# Patient Record
Sex: MALE | Race: BLACK OR AFRICAN AMERICAN | NOT HISPANIC OR LATINO | Employment: OTHER | URBAN - METROPOLITAN AREA
[De-identification: names, ages, dates, MRNs, and addresses within clinical notes are randomized per-mention and may not be internally consistent; named-entity substitution may affect disease eponyms.]

---

## 2017-10-31 ENCOUNTER — HOSPITAL ENCOUNTER (EMERGENCY)
Facility: HOSPITAL | Age: 77
Discharge: HOME | End: 2017-10-31
Attending: EMERGENCY MEDICINE
Payer: MEDICARE

## 2017-10-31 VITALS
SYSTOLIC BLOOD PRESSURE: 108 MMHG | RESPIRATION RATE: 20 BRPM | HEIGHT: 69 IN | WEIGHT: 172 LBS | TEMPERATURE: 98 F | HEART RATE: 135 BPM | OXYGEN SATURATION: 96 % | BODY MASS INDEX: 25.48 KG/M2 | DIASTOLIC BLOOD PRESSURE: 70 MMHG

## 2017-10-31 DIAGNOSIS — R33.9 URINARY RETENTION: Primary | ICD-10-CM

## 2017-10-31 LAB
BACTERIA #/AREA URNS HPF: ABNORMAL /HPF
BILIRUB UR QL STRIP: NEGATIVE
CLARITY UR: ABNORMAL
COLOR UR: YELLOW
GLUCOSE UR QL STRIP: NEGATIVE
HGB UR QL STRIP: ABNORMAL
KETONES UR QL STRIP: ABNORMAL
LEUKOCYTE ESTERASE UR QL STRIP: NEGATIVE
MICROSCOPIC COMMENT: ABNORMAL
NITRITE UR QL STRIP: NEGATIVE
PH UR STRIP: 6 [PH] (ref 5–8)
PROT UR QL STRIP: ABNORMAL
RBC #/AREA URNS HPF: >100 /HPF (ref 0–4)
SP GR UR STRIP: 1.02 (ref 1–1.03)
URN SPEC COLLECT METH UR: ABNORMAL
UROBILINOGEN UR STRIP-ACNC: NEGATIVE EU/DL
WBC #/AREA URNS HPF: 10 /HPF (ref 0–5)

## 2017-10-31 PROCEDURE — 99283 EMERGENCY DEPT VISIT LOW MDM: CPT

## 2017-10-31 PROCEDURE — 51702 INSERT TEMP BLADDER CATH: CPT

## 2017-10-31 PROCEDURE — 81000 URINALYSIS NONAUTO W/SCOPE: CPT

## 2017-10-31 RX ORDER — LIDOCAINE HYDROCHLORIDE 20 MG/ML
JELLY TOPICAL
Status: DISCONTINUED
Start: 2017-10-31 | End: 2017-10-31 | Stop reason: HOSPADM

## 2017-10-31 NOTE — ED PROVIDER NOTES
Encounter Date: 10/31/2017       History     Chief Complaint   Patient presents with    Urinary Retention     x 3 hours     HPI  Review of patient's allergies indicates:   Allergen Reactions    Sulfa (sulfonamide antibiotics) Rash     Past Medical History:   Diagnosis Date    Allergy     Arthritis     Cataract     Hypertension      Past Surgical History:   Procedure Laterality Date    knee surgery      right knee     Family History   Problem Relation Age of Onset    Heart disease Mother     Cancer Paternal Grandmother      stroke    Cancer Maternal Grandmother      lung cancer    Cancer Maternal Grandfather      brain cancer     Social History   Substance Use Topics    Smoking status: Former Smoker    Smokeless tobacco: Never Used    Alcohol use No     Review of Systems    Physical Exam     Initial Vitals [10/31/17 0240]   BP Pulse Resp Temp SpO2   108/70 (!) 135 20 98.2 °F (36.8 °C) 96 %      MAP       82.67         Physical Exam    ED Course   Procedures  Labs Reviewed - No data to display          Medical Decision Making:   ED Management:  Josh Pinto is a 77 y.o. male who presents with  a one-day history of progressively worsening urinary retention.  Catheter is placed with 600 mL of urine.  There is no evidence of UTI.  He has a history of BPH which most likely attributes to his urinary retention.  Valdez is left in place with referral to urology.                   ED Course      Clinical Impression:   The encounter diagnosis was Urinary retention.                           Chi Vaughan III, MD  10/31/17 1936

## 2017-10-31 NOTE — ED NOTES
Pt presents with complaints of urinary retention. Last time he urinated was about 3 hours prior to arrival and then only a small amount, small stream and still felt and feels full. History of enlarged prostate and has had to have a catheter before about 2 years ago for this same problem. Pt alert and oriented. Skin intact, warm and dry. [facility]BR[facility] well.

## 2019-05-17 ENCOUNTER — HOSPITAL ENCOUNTER (OUTPATIENT)
Dept: RADIOLOGY | Facility: HOSPITAL | Age: 79
Discharge: HOME | End: 2019-05-17
Attending: NURSE PRACTITIONER
Payer: MEDICARE

## 2019-05-17 DIAGNOSIS — R60.0 ARM EDEMA: Primary | ICD-10-CM

## 2019-05-17 DIAGNOSIS — R60.0 ARM EDEMA: ICD-10-CM

## 2019-05-17 PROCEDURE — 93971 US UPPER EXTREMITY VEINS RIGHT: ICD-10-PCS | Mod: 26,RT,, | Performed by: RADIOLOGY

## 2019-05-17 PROCEDURE — 93971 EXTREMITY STUDY: CPT | Mod: TC,PN,RT

## 2019-05-17 PROCEDURE — 93971 EXTREMITY STUDY: CPT | Mod: 26,RT,, | Performed by: RADIOLOGY

## 2020-03-12 DIAGNOSIS — M54.2 CERVICALGIA: Primary | ICD-10-CM

## 2023-06-06 ENCOUNTER — TELEPHONE (OUTPATIENT)
Dept: NEUROLOGY | Facility: CLINIC | Age: 83
End: 2023-06-06
Payer: MEDICARE

## 2023-06-06 NOTE — TELEPHONE ENCOUNTER
Returned pt's call. Pt stated he has not seen a neurologist or had any testing done but he feels he may have MS based on what he has researched. Pt asked to see Dr Leo. This Rn explained Dr Leo does not do MS work ups. Offered to help pt set up appt with general neurology and he declined.

## 2023-06-06 NOTE — TELEPHONE ENCOUNTER
----- Message from Maria Guadalupe Flanagan RN sent at 6/6/2023  2:16 PM CDT -----  Regarding: FW: NP appt request  Contact: @ [phone]    ----- Message -----  From: Marcel Golden  Sent: 6/6/2023   2:11 PM CDT  To: , #  Subject: NP appt request                                  Pt called in stating that he thinks that he might have MS. Pt is requesting to be seen by Nadja Leo if she is accepting new patients. Please call pt to discuss further.

## 2023-06-07 ENCOUNTER — TELEPHONE (OUTPATIENT)
Dept: NEUROLOGY | Facility: CLINIC | Age: 83
End: 2023-06-07
Payer: MEDICARE

## 2023-06-07 NOTE — TELEPHONE ENCOUNTER
----- Message from Marcel Golden sent at 6/6/2023  2:04 PM CDT -----  Regarding: NP appt request  Contact: @ [phone]  Pt called in stating that he thinks that he might have MS. Pt is requesting to be seen by Nadja Leo if she is accepting new patients. Please call pt to discuss further.

## 2023-08-13 ENCOUNTER — HOSPITAL ENCOUNTER (EMERGENCY)
Facility: HOSPITAL | Age: 83
Discharge: HOME | End: 2023-08-13
Attending: EMERGENCY MEDICINE
Payer: MEDICARE

## 2023-08-13 VITALS
DIASTOLIC BLOOD PRESSURE: 72 MMHG | SYSTOLIC BLOOD PRESSURE: 119 MMHG | RESPIRATION RATE: 19 BRPM | BODY MASS INDEX: 24.59 KG/M2 | TEMPERATURE: 98 F | WEIGHT: 166 LBS | HEART RATE: 98 BPM | OXYGEN SATURATION: 90 % | HEIGHT: 69 IN

## 2023-08-13 DIAGNOSIS — R31.0 GROSS HEMATURIA: Primary | ICD-10-CM

## 2023-08-13 LAB
ALBUMIN SERPL BCP-MCNC: 3.6 G/DL (ref 3.5–5.2)
ALP SERPL-CCNC: 115 U/L (ref 55–135)
ALT SERPL W/O P-5'-P-CCNC: 15 U/L (ref 10–44)
ANION GAP SERPL CALC-SCNC: 11 MMOL/L (ref 8–16)
AST SERPL-CCNC: 19 U/L (ref 10–40)
BASOPHILS # BLD AUTO: 0.04 K/UL (ref 0–0.2)
BASOPHILS NFR BLD: 0.4 % (ref 0–1.9)
BILIRUB SERPL-MCNC: 0.6 MG/DL (ref 0.1–1)
BILIRUB UR QL STRIP: ABNORMAL
BUN SERPL-MCNC: 29 MG/DL (ref 8–23)
CALCIUM SERPL-MCNC: 9.6 MG/DL (ref 8.7–10.5)
CHLORIDE SERPL-SCNC: 110 MMOL/L (ref 95–110)
CLARITY UR: ABNORMAL
CO2 SERPL-SCNC: 19 MMOL/L (ref 23–29)
COLOR UR: ABNORMAL
CREAT SERPL-MCNC: 2.1 MG/DL (ref 0.5–1.4)
DIFFERENTIAL METHOD: ABNORMAL
EOSINOPHIL # BLD AUTO: 0.2 K/UL (ref 0–0.5)
EOSINOPHIL NFR BLD: 1.6 % (ref 0–8)
ERYTHROCYTE [DISTWIDTH] IN BLOOD BY AUTOMATED COUNT: 14.3 % (ref 11.5–14.5)
EST. GFR  (NO RACE VARIABLE): 31 ML/MIN/1.73 M^2
GLUCOSE SERPL-MCNC: 150 MG/DL (ref 70–110)
GLUCOSE UR QL STRIP: ABNORMAL
HCT VFR BLD AUTO: 52.1 % (ref 40–54)
HGB BLD-MCNC: 17.7 G/DL (ref 14–18)
HGB UR QL STRIP: ABNORMAL
IMM GRANULOCYTES # BLD AUTO: 0.03 K/UL (ref 0–0.04)
IMM GRANULOCYTES NFR BLD AUTO: 0.3 % (ref 0–0.5)
INR PPP: 1.1 (ref 0.8–1.2)
KETONES UR QL STRIP: ABNORMAL
LEUKOCYTE ESTERASE UR QL STRIP: ABNORMAL
LYMPHOCYTES # BLD AUTO: 0.7 K/UL (ref 1–4.8)
LYMPHOCYTES NFR BLD: 6.9 % (ref 18–48)
MCH RBC QN AUTO: 30.7 PG (ref 27–31)
MCHC RBC AUTO-ENTMCNC: 34 G/DL (ref 32–36)
MCV RBC AUTO: 90 FL (ref 82–98)
MICROSCOPIC COMMENT: ABNORMAL
MONOCYTES # BLD AUTO: 1.3 K/UL (ref 0.3–1)
MONOCYTES NFR BLD: 12.6 % (ref 4–15)
NEUTROPHILS # BLD AUTO: 8 K/UL (ref 1.8–7.7)
NEUTROPHILS NFR BLD: 78.2 % (ref 38–73)
NITRITE UR QL STRIP: ABNORMAL
NRBC BLD-RTO: 0 /100 WBC
PH UR STRIP: ABNORMAL [PH] (ref 5–8)
PLATELET # BLD AUTO: 236 K/UL (ref 150–450)
PMV BLD AUTO: 10.5 FL (ref 9.2–12.9)
POTASSIUM SERPL-SCNC: 4.3 MMOL/L (ref 3.5–5.1)
PROT SERPL-MCNC: 7.9 G/DL (ref 6–8.4)
PROT UR QL STRIP: ABNORMAL
PROTHROMBIN TIME: 11.2 SEC (ref 9–12.5)
RBC # BLD AUTO: 5.77 M/UL (ref 4.6–6.2)
RBC #/AREA URNS HPF: >100 /HPF (ref 0–4)
SODIUM SERPL-SCNC: 140 MMOL/L (ref 136–145)
SP GR UR STRIP: ABNORMAL (ref 1–1.03)
URN SPEC COLLECT METH UR: ABNORMAL
UROBILINOGEN UR STRIP-ACNC: ABNORMAL EU/DL
WBC # BLD AUTO: 10.17 K/UL (ref 3.9–12.7)
WBC #/AREA URNS HPF: 10 /HPF (ref 0–5)

## 2023-08-13 PROCEDURE — 81000 URINALYSIS NONAUTO W/SCOPE: CPT | Performed by: EMERGENCY MEDICINE

## 2023-08-13 PROCEDURE — 99283 EMERGENCY DEPT VISIT LOW MDM: CPT

## 2023-08-13 PROCEDURE — 85025 COMPLETE CBC W/AUTO DIFF WBC: CPT | Performed by: EMERGENCY MEDICINE

## 2023-08-13 PROCEDURE — 80053 COMPREHEN METABOLIC PANEL: CPT | Performed by: EMERGENCY MEDICINE

## 2023-08-13 PROCEDURE — 85610 PROTHROMBIN TIME: CPT | Performed by: EMERGENCY MEDICINE

## 2023-08-13 RX ORDER — HYDROCODONE BITARTRATE AND ACETAMINOPHEN 5; 325 MG/1; MG/1
1 TABLET ORAL EVERY 4 HOURS PRN
Qty: 8 TABLET | Refills: 0 | Status: ON HOLD | OUTPATIENT
Start: 2023-08-13 | End: 2023-08-30 | Stop reason: SDUPTHER

## 2023-08-13 NOTE — DISCHARGE INSTRUCTIONS
As we discussed, follow-up with your primary care doctor tomorrow.  A referral to be seen by a urologist has also been entered.  You should receive a phone call within the next several days regarding an appointment.  Make sure you drink lots of water over the next several days.  Return here immediately if you have any difficulty passing urine.

## 2023-08-13 NOTE — ED PROVIDER NOTES
"Encounter Date: 8/13/2023       History     Chief Complaint   Patient presents with    Hematuria     Patient reports that overnight he developed pain and bloody urine. Patient states he was seen at an urgent care clinic and was told to go to the ER.      83-year-old male, here from home via private vehicle for evaluation and treatment of hematuria.  Patient 1st noticed hematuria last night at around midnight.  He has mild discomfort "in the prostate".  Has a history of enlarged prostate and takes medications for this.  Does not see a urologist.  He states that his physician recently checked his PSA and found it to be normal.  Patient denies fever or chills.  No dysuria before the hematuria started.  He has been urinating without difficulty.  No clots.  The symptoms better or worse.      Review of patient's allergies indicates:   Allergen Reactions    Sulfa (sulfonamide antibiotics) Rash     Past Medical History:   Diagnosis Date    Allergy     Arthritis     Cataract     Hypertension      Past Surgical History:   Procedure Laterality Date    knee surgery      right knee     Family History   Problem Relation Age of Onset    Heart disease Mother     Cancer Paternal Grandmother         stroke    Cancer Maternal Grandmother         lung cancer    Cancer Maternal Grandfather         brain cancer     Social History     Tobacco Use    Smoking status: Former     Current packs/day: 0.00    Smokeless tobacco: Never   Substance Use Topics    Alcohol use: No    Drug use: No     Review of Systems   Constitutional:  Negative for chills and fever.   HENT: Negative.     Eyes: Negative.    Respiratory: Negative.     Cardiovascular: Negative.    Gastrointestinal: Negative.    Endocrine: Negative.    Genitourinary:  Positive for hematuria. Negative for decreased urine volume, dysuria, enuresis, flank pain, frequency, penile pain, penile swelling, scrotal swelling and testicular pain.   Musculoskeletal: Negative.    Skin: Negative.  "   Allergic/Immunologic: Negative.    Neurological: Negative.    Hematological: Negative.    Psychiatric/Behavioral: Negative.         Physical Exam     Initial Vitals [08/13/23 1405]   BP Pulse Resp Temp SpO2   127/77 98 19 97.6 °F (36.4 °C) (!) 93 %      MAP       --         Physical Exam    Nursing note and vitals reviewed.  Constitutional: He appears well-developed and well-nourished. He is not diaphoretic. No distress.   HENT:   Head: Normocephalic and atraumatic.   Nose: Nose normal.   Mouth/Throat: Oropharynx is clear and moist. No oropharyngeal exudate.   Eyes: Conjunctivae and EOM are normal. Pupils are equal, round, and reactive to light. No scleral icterus.   Neck: Neck supple. No JVD present.   Normal range of motion.  Cardiovascular:  Normal rate, regular rhythm, normal heart sounds and intact distal pulses.           No murmur heard.  Pulmonary/Chest: Breath sounds normal. No stridor. No respiratory distress. He has no wheezes. He has no rhonchi. He has no rales.   Abdominal: Abdomen is soft. Bowel sounds are normal. He exhibits no distension. There is no abdominal tenderness. There is no rebound and no guarding.   Musculoskeletal:         General: No tenderness or edema. Normal range of motion.      Cervical back: Normal range of motion and neck supple.     Neurological: He is alert and oriented to person, place, and time. He has normal strength. No cranial nerve deficit or sensory deficit. GCS score is 15. GCS eye subscore is 4. GCS verbal subscore is 5. GCS motor subscore is 6.   Skin: Skin is warm and dry. Capillary refill takes less than 2 seconds. No rash noted. No erythema.   Psychiatric: He has a normal mood and affect. His behavior is normal.         ED Course   Procedures  Labs Reviewed   CBC W/ AUTO DIFFERENTIAL - Abnormal; Notable for the following components:       Result Value    Gran # (ANC) 8.0 (*)     Lymph # 0.7 (*)     Mono # 1.3 (*)     Gran % 78.2 (*)     Lymph % 6.9 (*)     All  other components within normal limits   COMPREHENSIVE METABOLIC PANEL - Abnormal; Notable for the following components:    CO2 19 (*)     Glucose 150 (*)     BUN 29 (*)     Creatinine 2.1 (*)     eGFR 31.0 (*)     All other components within normal limits   URINALYSIS, REFLEX TO URINE CULTURE - Abnormal; Notable for the following components:    Color, UA Red (*)     Appearance, UA Cloudy (*)     All other components within normal limits    Narrative:     Preferred Collection Type->Urine, Clean Catch  Specimen Source->Urine   URINALYSIS MICROSCOPIC - Abnormal; Notable for the following components:    RBC, UA >100 (*)     WBC, UA 10 (*)     All other components within normal limits    Narrative:     Preferred Collection Type->Urine, Clean Catch  Specimen Source->Urine   PROTIME-INR          Imaging Results    None          Medications - No data to display  Medical Decision Making:   Differential Diagnosis:   Neoplasm, prostatitis, coagulopathy, UTI, pyelonephritis, etc.  ED Management:  Patient's labs showed a mild elevation of his BUN/creatinine.  White count was normal.  H&H normal.  Patient complains of only mild discomfort.  A review of the patient's chart shows that about 5 years ago he had an ultrasound done which showed a mass in the left kidney, suspicious for renal cell carcinoma.  Patient states that he followed up with an oncologist after that, and was told that he does not have kidney cancer.  However, in the absence of another cause, a neoplasm must be considered.  I discussed ultrasound with the patient, but he told me that he had an ultrasound in his PCPs office less than 6 months ago.  I believe that the patient is safe for discharge at this time, with strict return precautions for any difficulty passing urine or other worsening/concerning symptoms.  Patient agrees with this plan of care.  He will follow-up with his PCP, and referral to be seen by Urology within the week has been given.  Will also  prescribe low-dose Norco for pain not controlled by Tylenol at home.  Patient understands he needs to stay below 3 g Tylenol per day.                          Clinical Impression:   Final diagnoses:  [R31.0] Gross hematuria (Primary)        ED Disposition Condition    Discharge Stable          ED Prescriptions    None       Follow-up Information       Follow up With Specialties Details Why Contact Info    Dr. Chang  Call in 1 day      Urologist   when scheduled     [facility] Emergency Dept Emergency Medicine  As needed, If symptoms worsen [address] [facility] [address] [phone]             Yuri Tavarez MD  08/13/23 8132

## 2023-08-15 ENCOUNTER — LAB VISIT (OUTPATIENT)
Dept: LAB | Facility: HOSPITAL | Age: 83
End: 2023-08-15
Attending: INTERNAL MEDICINE
Payer: MEDICARE

## 2023-08-15 DIAGNOSIS — R30.0 DYSURIA: Primary | ICD-10-CM

## 2023-08-15 LAB
PROSTATE SPECIFIC ANTIGEN, TOTAL: 3.5 NG/ML (ref 0–4)
PSA FREE MFR SERPL: 27.43 %
PSA FREE SERPL-MCNC: 0.96 NG/ML (ref 0–1.5)

## 2023-08-15 PROCEDURE — 84153 ASSAY OF PSA TOTAL: CPT | Performed by: FAMILY MEDICINE

## 2023-08-15 PROCEDURE — 36415 COLL VENOUS BLD VENIPUNCTURE: CPT | Performed by: FAMILY MEDICINE

## 2023-08-16 ENCOUNTER — HOSPITAL ENCOUNTER (EMERGENCY)
Facility: HOSPITAL | Age: 83
Discharge: ANOTHER HOSPITAL | End: 2023-08-16
Attending: EMERGENCY MEDICINE
Payer: MEDICARE

## 2023-08-16 VITALS
OXYGEN SATURATION: 95 % | HEIGHT: 69 IN | TEMPERATURE: 98 F | SYSTOLIC BLOOD PRESSURE: 100 MMHG | DIASTOLIC BLOOD PRESSURE: 58 MMHG | WEIGHT: 161 LBS | BODY MASS INDEX: 23.85 KG/M2 | HEART RATE: 80 BPM | RESPIRATION RATE: 10 BRPM

## 2023-08-16 DIAGNOSIS — N28.89 LEFT RENAL MASS: Primary | ICD-10-CM

## 2023-08-16 DIAGNOSIS — R31.0 GROSS HEMATURIA: ICD-10-CM

## 2023-08-16 DIAGNOSIS — I95.9 HYPOTENSION: ICD-10-CM

## 2023-08-16 DIAGNOSIS — D72.829 LEUKOCYTOSIS, UNSPECIFIED TYPE: ICD-10-CM

## 2023-08-16 DIAGNOSIS — A41.9 SEPSIS, DUE TO UNSPECIFIED ORGANISM, UNSPECIFIED WHETHER ACUTE ORGAN DYSFUNCTION PRESENT: ICD-10-CM

## 2023-08-16 LAB
ABO + RH BLD: NORMAL
ALBUMIN SERPL BCP-MCNC: 3.2 G/DL (ref 3.5–5.2)
ALP SERPL-CCNC: 91 U/L (ref 55–135)
ALT SERPL W/O P-5'-P-CCNC: 12 U/L (ref 10–44)
ANION GAP SERPL CALC-SCNC: 12 MMOL/L (ref 8–16)
AST SERPL-CCNC: 19 U/L (ref 10–40)
BASOPHILS # BLD AUTO: 0.02 K/UL (ref 0–0.2)
BASOPHILS NFR BLD: 0.1 % (ref 0–1.9)
BILIRUB SERPL-MCNC: 0.6 MG/DL (ref 0.1–1)
BILIRUB UR QL STRIP: ABNORMAL
BLD GP AB SCN CELLS X3 SERPL QL: NORMAL
BUN SERPL-MCNC: 45 MG/DL (ref 8–23)
CALCIUM SERPL-MCNC: 9.3 MG/DL (ref 8.7–10.5)
CHLORIDE SERPL-SCNC: 107 MMOL/L (ref 95–110)
CLARITY UR: ABNORMAL
CO2 SERPL-SCNC: 18 MMOL/L (ref 23–29)
COLOR UR: ABNORMAL
CREAT SERPL-MCNC: 2.7 MG/DL (ref 0.5–1.4)
DIFFERENTIAL METHOD: ABNORMAL
EOSINOPHIL # BLD AUTO: 0 K/UL (ref 0–0.5)
EOSINOPHIL NFR BLD: 0 % (ref 0–8)
ERYTHROCYTE [DISTWIDTH] IN BLOOD BY AUTOMATED COUNT: 14.2 % (ref 11.5–14.5)
EST. GFR  (NO RACE VARIABLE): 23 ML/MIN/1.73 M^2
GLUCOSE SERPL-MCNC: 177 MG/DL (ref 70–110)
GLUCOSE UR QL STRIP: ABNORMAL
HCT VFR BLD AUTO: 45.1 % (ref 40–54)
HCV AB SERPL QL IA: NORMAL
HGB BLD-MCNC: 15.5 G/DL (ref 14–18)
HGB UR QL STRIP: ABNORMAL
IMM GRANULOCYTES # BLD AUTO: 0.09 K/UL (ref 0–0.04)
IMM GRANULOCYTES NFR BLD AUTO: 0.6 % (ref 0–0.5)
INFLUENZA A, MOLECULAR: NEGATIVE
INFLUENZA B, MOLECULAR: NEGATIVE
INR PPP: 1.1 (ref 0.8–1.2)
KETONES UR QL STRIP: ABNORMAL
LACTATE SERPL-SCNC: 1.9 MMOL/L (ref 0.5–2.2)
LEUKOCYTE ESTERASE UR QL STRIP: ABNORMAL
LYMPHOCYTES # BLD AUTO: 0.6 K/UL (ref 1–4.8)
LYMPHOCYTES NFR BLD: 4.2 % (ref 18–48)
MCH RBC QN AUTO: 30.7 PG (ref 27–31)
MCHC RBC AUTO-ENTMCNC: 34.4 G/DL (ref 32–36)
MCV RBC AUTO: 89 FL (ref 82–98)
MICROSCOPIC COMMENT: ABNORMAL
MONOCYTES # BLD AUTO: 1.2 K/UL (ref 0.3–1)
MONOCYTES NFR BLD: 7.8 % (ref 4–15)
NEUTROPHILS # BLD AUTO: 13.2 K/UL (ref 1.8–7.7)
NEUTROPHILS NFR BLD: 87.3 % (ref 38–73)
NITRITE UR QL STRIP: ABNORMAL
NRBC BLD-RTO: 0 /100 WBC
PH UR STRIP: ABNORMAL [PH] (ref 5–8)
PLATELET # BLD AUTO: 264 K/UL (ref 150–450)
PMV BLD AUTO: 10.8 FL (ref 9.2–12.9)
POTASSIUM SERPL-SCNC: 4.1 MMOL/L (ref 3.5–5.1)
PROT SERPL-MCNC: 6.9 G/DL (ref 6–8.4)
PROT UR QL STRIP: ABNORMAL
PROTHROMBIN TIME: 11.1 SEC (ref 9–12.5)
RBC # BLD AUTO: 5.05 M/UL (ref 4.6–6.2)
RBC #/AREA URNS HPF: >100 /HPF (ref 0–4)
SARS-COV-2 RDRP RESP QL NAA+PROBE: NEGATIVE
SODIUM SERPL-SCNC: 137 MMOL/L (ref 136–145)
SP GR UR STRIP: ABNORMAL (ref 1–1.03)
SPECIMEN OUTDATE: NORMAL
SPECIMEN SOURCE: NORMAL
TROPONIN I SERPL DL<=0.01 NG/ML-MCNC: 0.01 NG/ML (ref 0–0.03)
URN SPEC COLLECT METH UR: ABNORMAL
UROBILINOGEN UR STRIP-ACNC: ABNORMAL EU/DL
WBC # BLD AUTO: 15.13 K/UL (ref 3.9–12.7)
WBC #/AREA URNS HPF: 10 /HPF (ref 0–5)

## 2023-08-16 PROCEDURE — 86803 HEPATITIS C AB TEST: CPT | Performed by: EMERGENCY MEDICINE

## 2023-08-16 PROCEDURE — 71045 X-RAY EXAM CHEST 1 VIEW: CPT | Mod: 26,,, | Performed by: RADIOLOGY

## 2023-08-16 PROCEDURE — 74176 CT ABDOMEN PELVIS WITHOUT CONTRAST: ICD-10-PCS | Mod: 26,,, | Performed by: RADIOLOGY

## 2023-08-16 PROCEDURE — 85610 PROTHROMBIN TIME: CPT | Performed by: EMERGENCY MEDICINE

## 2023-08-16 PROCEDURE — 74176 CT ABD & PELVIS W/O CONTRAST: CPT | Mod: 26,,, | Performed by: RADIOLOGY

## 2023-08-16 PROCEDURE — U0002 COVID-19 LAB TEST NON-CDC: HCPCS | Performed by: EMERGENCY MEDICINE

## 2023-08-16 PROCEDURE — 83605 ASSAY OF LACTIC ACID: CPT | Performed by: EMERGENCY MEDICINE

## 2023-08-16 PROCEDURE — 85025 COMPLETE CBC W/AUTO DIFF WBC: CPT | Performed by: EMERGENCY MEDICINE

## 2023-08-16 PROCEDURE — 84484 ASSAY OF TROPONIN QUANT: CPT | Performed by: EMERGENCY MEDICINE

## 2023-08-16 PROCEDURE — 96367 TX/PROPH/DG ADDL SEQ IV INF: CPT

## 2023-08-16 PROCEDURE — 25000003 PHARM REV CODE 250: Performed by: EMERGENCY MEDICINE

## 2023-08-16 PROCEDURE — 86900 BLOOD TYPING SEROLOGIC ABO: CPT | Performed by: EMERGENCY MEDICINE

## 2023-08-16 PROCEDURE — 96365 THER/PROPH/DIAG IV INF INIT: CPT

## 2023-08-16 PROCEDURE — 81000 URINALYSIS NONAUTO W/SCOPE: CPT | Performed by: EMERGENCY MEDICINE

## 2023-08-16 PROCEDURE — 96366 THER/PROPH/DIAG IV INF ADDON: CPT

## 2023-08-16 PROCEDURE — 93010 EKG 12-LEAD: ICD-10-PCS | Mod: ,,, | Performed by: INTERNAL MEDICINE

## 2023-08-16 PROCEDURE — 87040 BLOOD CULTURE FOR BACTERIA: CPT | Mod: 59 | Performed by: EMERGENCY MEDICINE

## 2023-08-16 PROCEDURE — 74176 CT ABD & PELVIS W/O CONTRAST: CPT | Mod: TC

## 2023-08-16 PROCEDURE — 93010 ELECTROCARDIOGRAM REPORT: CPT | Mod: ,,, | Performed by: INTERNAL MEDICINE

## 2023-08-16 PROCEDURE — 71045 XR CHEST AP PORTABLE: ICD-10-PCS | Mod: 26,,, | Performed by: RADIOLOGY

## 2023-08-16 PROCEDURE — 87502 INFLUENZA DNA AMP PROBE: CPT | Performed by: EMERGENCY MEDICINE

## 2023-08-16 PROCEDURE — 93005 ELECTROCARDIOGRAM TRACING: CPT

## 2023-08-16 PROCEDURE — 63600175 PHARM REV CODE 636 W HCPCS: Performed by: EMERGENCY MEDICINE

## 2023-08-16 PROCEDURE — 80053 COMPREHEN METABOLIC PANEL: CPT | Performed by: EMERGENCY MEDICINE

## 2023-08-16 PROCEDURE — 96361 HYDRATE IV INFUSION ADD-ON: CPT

## 2023-08-16 PROCEDURE — 71045 X-RAY EXAM CHEST 1 VIEW: CPT | Mod: TC

## 2023-08-16 PROCEDURE — 36415 COLL VENOUS BLD VENIPUNCTURE: CPT | Performed by: EMERGENCY MEDICINE

## 2023-08-16 PROCEDURE — 87389 HIV-1 AG W/HIV-1&-2 AB AG IA: CPT | Performed by: EMERGENCY MEDICINE

## 2023-08-16 PROCEDURE — 99291 CRITICAL CARE FIRST HOUR: CPT

## 2023-08-16 RX ORDER — PIPERACILLIN SODIUM, TAZOBACTAM SODIUM 3; .375 G/15ML; G/15ML
INJECTION, POWDER, LYOPHILIZED, FOR SOLUTION INTRAVENOUS
Status: DISPENSED
Start: 2023-08-16 | End: 2023-08-17

## 2023-08-16 RX ADMIN — PIPERACILLIN AND TAZOBACTAM 3.38 G: 3; .375 INJECTION, POWDER, LYOPHILIZED, FOR SOLUTION INTRAVENOUS; PARENTERAL at 07:08

## 2023-08-16 RX ADMIN — SODIUM CHLORIDE 1000 ML: 9 INJECTION, SOLUTION INTRAVENOUS at 01:08

## 2023-08-16 RX ADMIN — CEFTRIAXONE SODIUM 1 G: 1 INJECTION, POWDER, FOR SOLUTION INTRAMUSCULAR; INTRAVENOUS at 05:08

## 2023-08-16 RX ADMIN — SODIUM CHLORIDE 1000 ML: 9 INJECTION, SOLUTION INTRAVENOUS at 05:08

## 2023-08-16 RX ADMIN — DEXTROSE MONOHYDRATE 1750 MG: 5 INJECTION, SOLUTION INTRAVENOUS at 05:08

## 2023-08-16 NOTE — ED TRIAGE NOTES
Patient relays that he has been weak and dizzy. Heavy blood noted in urine x 4 days. Patient is hypotensive in triage.

## 2023-08-16 NOTE — ED PROVIDER NOTES
Encounter Date: 8/16/2023       History     Chief Complaint   Patient presents with    Dizziness    Weakness    hypotensive     Patient relays that he has been experiencing hematuria x 4 days. Weakness today. Hypotensive in triage.      83-year-old male here from home via private vehicle for evaluation and treatment of weakness and lightheadedness.  Patient was seen here 4 days ago with complaints of hematuria.  He was discharged home at that time with a referral to Urology.  Patient states he has an appointment tomorrow.  Today, upon awakening, patient felt fatigued and weak.  This sensation worsened throughout the day so he came here for evaluation and treatment.  Blood pressure in triage was 71/51.      Review of patient's allergies indicates:   Allergen Reactions    Sulfa (sulfonamide antibiotics) Rash     Past Medical History:   Diagnosis Date    Allergy     Arthritis     Cataract     Hypertension      Past Surgical History:   Procedure Laterality Date    BLADDER FULGURATION N/A 8/19/2023    Procedure: FULGURATION, BLADDER;  Surgeon: Abdulaziz Garcia MD;  Location: [facility] OR;  Service: Urology;  Laterality: N/A;    CYSTOSCOPY N/A 8/19/2023    Procedure: CYSTOSCOPY;  Surgeon: Abdulaziz Garcia MD;  Location: [facility] OR;  Service: Urology;  Laterality: N/A;    ESOPHAGOGASTRODUODENOSCOPY N/A 9/20/2023    Procedure: EGD (ESOPHAGOGASTRODUODENOSCOPY);  Surgeon: Varinder Melo MD;  Location: [facility] ENDO;  Service: Gastroenterology;  Laterality: N/A;    HERNIA REPAIR N/A 9/3/2023    Procedure: REPAIR, HERNIA INTERNAL;  Surgeon: Andrew Juares MD;  Location: [facility] OR;  Service: General;  Laterality: N/A;    INSERTION OF CATHETER N/A 8/19/2023    Procedure: INSERTION, CATHETER;  Surgeon: Abdulaziz Garcia MD;  Location: [facility] OR;  Service: Urology;  Laterality: N/A;    knee surgery      right knee    LAPAROSCOPIC ROBOT-ASSISTED SURGICAL REMOVAL OF KIDNEY USING DA EVIN XI Left 8/20/2023    Procedure: XI ROBOTIC  NEPHRECTOMY;  Surgeon: Abdulaziz Garcia MD;  Location: [facility] OR;  Service: Urology;  Laterality: Left;    LAPAROTOMY, EXPLORATORY N/A 9/3/2023    Procedure: LAPAROTOMY, EXPLORATORY;  Surgeon: Andrew Juares MD;  Location: [facility] OR;  Service: General;  Laterality: N/A;     Family History   Problem Relation Age of Onset    Heart disease Mother     Cancer Paternal Grandmother         stroke    Cancer Maternal Grandmother         lung cancer    Cancer Maternal Grandfather         brain cancer     Social History     Tobacco Use    Smoking status: Former    Smokeless tobacco: Never   Substance Use Topics    Alcohol use: No    Drug use: No     Review of Systems   Constitutional:  Positive for fatigue. Negative for chills and fever.   HENT:  Negative for congestion, rhinorrhea and sore throat.    Respiratory:  Negative for cough, chest tightness and shortness of breath.    Cardiovascular:  Negative for chest pain and palpitations.   Gastrointestinal:  Negative for abdominal pain, blood in stool, constipation, diarrhea, nausea and vomiting.   Genitourinary:  Positive for hematuria. Negative for decreased urine volume, dysuria, flank pain, frequency, penile discharge and testicular pain.   Musculoskeletal:  Negative for arthralgias, back pain, myalgias, neck pain and neck stiffness.   Skin:  Negative for pallor and rash.   Neurological:  Positive for weakness and light-headedness. Negative for dizziness, syncope, numbness and headaches.   Psychiatric/Behavioral:  Negative for confusion and hallucinations. The patient is not nervous/anxious and is not hyperactive.        Physical Exam     Initial Vitals [08/16/23 1248]   BP Pulse Resp Temp SpO2   (!) 71/51 (!) 120 18 97.7 °F (36.5 °C) (!) 92 %      MAP       --         Physical Exam    Nursing note and vitals reviewed.  Constitutional: He appears well-developed and well-nourished. He is not diaphoretic. No distress.   HENT:   Head: Normocephalic and atraumatic.   Nose:  Nose normal.   Mouth/Throat: Oropharynx is clear and moist. No oropharyngeal exudate.   Eyes: Conjunctivae and EOM are normal. Pupils are equal, round, and reactive to light. No scleral icterus.   Neck: Neck supple. No JVD present.   Normal range of motion.  Cardiovascular:  Normal rate, regular rhythm, normal heart sounds and intact distal pulses.           No murmur heard.  Pulmonary/Chest: Breath sounds normal. No stridor. No respiratory distress. He has no wheezes. He has no rhonchi. He has no rales.   Abdominal: Abdomen is soft. Bowel sounds are normal. He exhibits no distension. There is no abdominal tenderness.   Musculoskeletal:         General: No tenderness or edema. Normal range of motion.      Cervical back: Normal range of motion and neck supple.     Neurological: He is alert and oriented to person, place, and time. He has normal strength. No cranial nerve deficit or sensory deficit. GCS score is 15. GCS eye subscore is 4. GCS verbal subscore is 5. GCS motor subscore is 6.   Skin: Skin is warm and dry. Capillary refill takes less than 2 seconds. No rash noted. No erythema.   Psychiatric: He has a normal mood and affect. His behavior is normal.         ED Course   Critical Care    Date/Time: 8/16/2023 6:27 PM    Performed by: Yuri Tavarez MD  Authorized by: Yuri Tavarez MD  Direct patient critical care time: 37 minutes  Additional history critical care time: 6 minutes  Ordering / reviewing critical care time: 7 minutes  Documentation critical care time: 15 minutes  Consulting other physicians critical care time: 5 minutes  Total critical care time (exclusive of procedural time) : 70 minutes  Critical care time was exclusive of separately billable procedures and treating other patients and teaching time.  Critical care was necessary to treat or prevent imminent or life-threatening deterioration of the following conditions: circulatory failure, cardiac failure, shock and sepsis.  Critical care was  time spent personally by me on the following activities: discussions with consultants, interpretation of cardiac output measurements, evaluation of patient's response to treatment, examination of patient, obtaining history from patient or surrogate, ordering and performing treatments and interventions, ordering and review of laboratory studies, ordering and review of radiographic studies, pulse oximetry, re-evaluation of patient's condition and review of old charts.        Labs Reviewed   CBC W/ AUTO DIFFERENTIAL - Abnormal; Notable for the following components:       Result Value    WBC 15.13 (*)     Immature Granulocytes 0.6 (*)     Gran # (ANC) 13.2 (*)     Immature Grans (Abs) 0.09 (*)     Lymph # 0.6 (*)     Mono # 1.2 (*)     Gran % 87.3 (*)     Lymph % 4.2 (*)     All other components within normal limits   COMPREHENSIVE METABOLIC PANEL - Abnormal; Notable for the following components:    CO2 18 (*)     Glucose 177 (*)     BUN 45 (*)     Creatinine 2.7 (*)     Albumin 3.2 (*)     eGFR 23.0 (*)     All other components within normal limits   URINALYSIS, REFLEX TO URINE CULTURE - Abnormal; Notable for the following components:    Color, UA Red (*)     Appearance, UA Cloudy (*)     All other components within normal limits    Narrative:     Preferred Collection Type->Urine, Clean Catch  Specimen Source->Urine   URINALYSIS MICROSCOPIC - Abnormal; Notable for the following components:    RBC, UA >100 (*)     WBC, UA 10 (*)     All other components within normal limits    Narrative:     Preferred Collection Type->Urine, Clean Catch  Specimen Source->Urine   INFLUENZA A & B BY MOLECULAR   CULTURE, BLOOD   CULTURE, BLOOD   HIV 1 / 2 ANTIBODY    Narrative:     Release to patient->Immediate   HEPATITIS C ANTIBODY    Narrative:     Release to patient->Immediate   LACTIC ACID, PLASMA   PROTIME-INR   SARS-COV-2 RNA AMPLIFICATION, QUAL    Narrative:     Is the patient symptomatic?->No   TROPONIN I   TROPONIN I   TYPE &  SCREEN     EKG Readings: (Independently Interpreted)   EKG personally reviewed by me shows atrial fibrillation at 94 beats per minute.  QRS 70 milliseconds, .  The only previous EKG for comparison is from 5 years ago, and it did not show any evidence of AFib.       ECG Results              EKG 12-lead (Final result)  Result time 08/17/23 15:57:30      Final result by Interface, Lab In [facility] (08/17/23 15:57:30)                   Narrative:    Test Reason : I95.9,    Vent. Rate : 094 BPM     Atrial Rate : 093 BPM     P-R Int : 000 ms          QRS Dur : 070 ms      QT Int : 344 ms       P-R-T Axes : 000 040 065 degrees     QTc Int : 430 ms    Atrial fibrillation  Abnormal ECG  When compared with ECG of 09-FEB-2015 14:43,  Atrial fibrillation has replaced Sinus rhythm  Confirmed by Johnathan Angel MD ([id number]) on 8/17/2023 3:57:22 PM    Referred By: [email or web address]   SELF           Confirmed By:Johnathan Angel MD                                  Imaging Results              CT Abdomen Pelvis  Without Contrast (Final result)  Result time 08/16/23 16:29:15      Final result by Toshia Yanez MD (08/16/23 16:29:15)                   Impression:      9.4 cm solid left renal mass consistent with renal cell carcinoma    Marked enlargement of the prostate.    Stable septated left midpole renal cyst, complex cystic lesion with rim calcification at the midpole of the right kidney.    Right nephrolithiasis.  Mild left hydroureter.  Colonic diverticula.      Electronically signed by: Toshia Yanez  Date:    08/16/2023  Time:    16:29               Narrative:    EXAMINATION:  CT ABDOMEN PELVIS WITHOUT CONTRAST    CLINICAL HISTORY:  Gross hematuria;    TECHNIQUE:  Low dose axial images, sagittal and coronal reformations were obtained from the lung bases to the pubic symphysis.  Neither oral nor IV contrast was administered    COMPARISON:  MRI 10/16/2015    FINDINGS:  Bronchiectasis and atelectasis noted in the lung bases.    The  liver, spleen, adrenal glands, pancreas, gallbladder are grossly normal.  Mild intrahepatic biliary ductal dilatation is unchanged.  Gallstones noted on prior MRI are not well demonstrated on CT.    There are simple appearing cysts at the lower and mid poles of the right kidney.  At the posterior mid/upper pole there is an exophytic structure with thick, rim calcification as well as some areas of internal calcification measuring 2.0 x 2.5 x 2.4 cm, not significantly changed compared to the previous MRI.  There is a 2 mm nonobstructing right lower pole renal calculus.    There is a large lobulated solid mass extending from the upper pole of the left kidney measuring 9.4 x 7.4 x 7.1 cm.  This has increased significantly in size compared to the previous MRI at which time there was a complex cystic lesion with 2 solid nodules measuring 2.9 x 2.8 by 3.5 cm.  This is highly suspicious for renal cell carcinoma.  There is extensive nodularity of the surrounding perinephric fat which may represent spread of tumor or tumor neovascularity.  There is a large simple cyst at the left upper pole, as well as a 3 cm anterior left midpole multi septated cyst which appears similar to the prior MRI.    There is mild left hydroureter into the pelvis of uncertain etiology.  No ureteral calculus is identified.  There is marked enlargement of the prostate measuring 9.2 x 8.3 cm.  The urinary bladder is thick-walled.    There are numerous colonic diverticula.  No obstruction or inflammation in seen in the bowel.  The aorta is normal in caliber and heavily calcified.  No adenopathy or ascites is present.                                       X-Ray Chest AP Portable (Final result)  Result time 08/16/23 15:03:57   Procedure changed from X-Ray Chest PA And Lateral     Final result by Benji Plata MD (08/16/23 15:03:57)                   Impression:      No acute chest disease.      Electronically signed by: Benji  Orange  Date:    08/16/2023  Time:    15:03               Narrative:    EXAMINATION:  XR CHEST AP PORTABLE    CLINICAL HISTORY:  weakness; Hypotension, unspecified    TECHNIQUE:  Portable view of the chest was performed.    COMPARISON:  02/09/2015.    FINDINGS:  Minimal linear discoid atelectasis at the right lung base.  Lungs are otherwise clear.  No focal consolidation.  Heart size normal.  Mediastinal contours unremarkable.  Trachea midline.    Bony thorax intact.                                    X-Rays:   Independently Interpreted Readings:   Other Readings:  CT abdomen pelvis without contrast shows a 9.4 cm solid left renal mass consistent with renal cell carcinoma.  Marked prostate enlargement, stable, septated left mid pole renal cyst with a complex cystic lesion with a calcified rim at the midpole of the right kidney.  Right nephrolithiasis, mild left hydroureter.    Medications   piperacillin-tazobactam (ZOSYN) 3.375 gram injection (has no administration in time range)   sodium chloride 0.9% bolus 1,000 mL 1,000 mL ( Intravenous Stopped 8/16/23 1449)   sodium chloride 0.9% bolus 1,000 mL 1,000 mL ( Intravenous Stopped 8/16/23 1833)   cefTRIAXone (ROCEPHIN) 1 g in dextrose 5 % in water (D5W) 100 mL IVPB (MB+) (0 g Intravenous Stopped 8/16/23 1734)   vancomycin (VANCOCIN) 1,750 mg in dextrose 5 % (D5W) 500 mL IVPB (0 mg Intravenous Stopped 8/16/23 1909)   piperacillin-tazobactam (ZOSYN) 3.375 g in dextrose 5 % in water (D5W) 100 mL IVPB (MB+) (0 g Intravenous Stopped 8/16/23 2019)     Medical Decision Making:   ED Management:  Hematuria, possible source prostate, bladder, or kidneys.  Prostate is enlarged, but it is likely that patient's source of blood as the left renal mass which is likely renal cell carcinoma.  Patient also has a white count of about 15,000, and he was hypotensive upon arrival.  He denies any fever or chills but does admit to fatigue and lightheadedness.  He has been treated with  vancomycin, Rocephin, and Zosyn.  His hypotension is slowly improving with fluids.  Patient was also found to be in atrial fibrillation.  Patient denies any history of atrial fibrillation in the past.  He is not on any sort of anticoagulation.  Patient will need services of Nephrology and Cardiology.  These are unavailable at this facility at this time so patient will need transfer.  I spoke to the patient about this and he agrees.  Transfer center was notified of the need for transfer and I was put in contact with Dr. Raffi Ovalle at [facility].  Patient was accepted for transfer there.  Patient will be a priority 1 transfer.  At shift change, Dr. Elliott, [facility] emergency physician was notified about the patient and she will manage any complications which arise with the transfer.                          Clinical Impression:   Final diagnoses:  [I95.9] Hypotension  [N28.89] Left renal mass (Primary)  [R31.0] Gross hematuria  [D72.829] Leukocytosis, unspecified type  [A41.9] Sepsis, due to unspecified organism, unspecified whether acute organ dysfunction present        ED Disposition Condition    Transfer to Another Facility Stable                Yuri Tavarez MD  08/16/23 3896       Yuri Tavarez MD  09/29/23 4064

## 2023-08-17 PROBLEM — I48.91 NEW ONSET ATRIAL FIBRILLATION: Status: ACTIVE | Noted: 2023-08-17

## 2023-08-17 PROBLEM — R31.0 GROSS HEMATURIA: Status: ACTIVE | Noted: 2023-08-17

## 2023-08-17 PROBLEM — N28.89 RENAL MASS: Status: ACTIVE | Noted: 2023-08-17

## 2023-08-17 LAB — HIV 1+2 AB+HIV1 P24 AG SERPL QL IA: NORMAL

## 2023-08-17 NOTE — ED NOTES
Pt lying quietly in bed, no acute distress noted at this time. Noted blood in urine, about 100ml in urinal. VS stable, on NC 2L. Call light within reach.

## 2023-08-18 PROBLEM — N17.9 AKI (ACUTE KIDNEY INJURY): Status: ACTIVE | Noted: 2023-08-18

## 2023-08-19 PROBLEM — D62 ACUTE BLOOD LOSS ANEMIA: Status: ACTIVE | Noted: 2023-08-19

## 2023-08-20 PROBLEM — G89.18 POST-OP PAIN: Status: ACTIVE | Noted: 2023-08-20

## 2023-08-21 PROBLEM — J96.01 ACUTE HYPOXEMIC RESPIRATORY FAILURE: Status: ACTIVE | Noted: 2023-08-21

## 2023-08-21 LAB
BACTERIA BLD CULT: NORMAL
BACTERIA BLD CULT: NORMAL

## 2023-08-22 PROBLEM — E83.42 HYPOMAGNESEMIA: Status: ACTIVE | Noted: 2023-08-22

## 2023-08-23 PROBLEM — K59.01 SLOW TRANSIT CONSTIPATION: Status: ACTIVE | Noted: 2023-08-23

## 2023-08-25 PROBLEM — J98.11 BILATERAL ATELECTASIS: Status: ACTIVE | Noted: 2023-08-25

## 2023-08-25 PROBLEM — R53.81 PHYSICAL DECONDITIONING: Status: ACTIVE | Noted: 2023-08-25

## 2023-08-26 PROBLEM — R33.9 URINARY RETENTION: Status: ACTIVE | Noted: 2023-08-26

## 2023-08-26 PROBLEM — D72.829 LEUKOCYTOSIS: Status: ACTIVE | Noted: 2023-08-26

## 2023-08-27 PROBLEM — N30.01 ACUTE CYSTITIS WITH HEMATURIA: Status: ACTIVE | Noted: 2023-08-27

## 2023-08-28 PROBLEM — N30.01 ACUTE CYSTITIS WITH HEMATURIA: Status: RESOLVED | Noted: 2023-08-27 | Resolved: 2023-08-28

## 2023-08-28 PROBLEM — J98.11 BILATERAL ATELECTASIS: Status: RESOLVED | Noted: 2023-08-25 | Resolved: 2023-08-28

## 2023-08-28 PROBLEM — R33.9 URINARY RETENTION: Status: RESOLVED | Noted: 2023-08-26 | Resolved: 2023-08-28

## 2023-08-28 PROBLEM — N30.90 CYSTITIS: Status: ACTIVE | Noted: 2023-08-27

## 2023-08-30 PROBLEM — M1A.09X0 CHRONIC GOUT OF MULTIPLE SITES: Status: ACTIVE | Noted: 2023-08-30

## 2023-08-31 ENCOUNTER — TELEPHONE (OUTPATIENT)
Dept: CARDIOLOGY | Facility: CLINIC | Age: 83
End: 2023-08-31
Payer: MEDICARE

## 2023-08-31 NOTE — TELEPHONE ENCOUNTER
----- Message from Valentín Espinoza sent at 8/31/2023  8:59 AM CDT -----  Type:  Sooner Appointment Request    Caller is requesting a sooner appointment.  Caller declined first available appointment listed below.  Caller will not accept being placed on the waitlist and is requesting a message be sent to doctor.    Name of Caller:  Pito Blakcwood   When is the first available appointment?    Symptoms:  [facility]  Best Call Back Number:  [phone]  Additional Information:

## 2023-08-31 NOTE — TELEPHONE ENCOUNTER
Spoke to Sara from [facility] and scheduled pt for ER f/u. Sara confirmed appt time and date.  Pt was consulted by Klarissa Trotter in ER for AF.

## 2023-09-03 PROBLEM — K56.609 SBO (SMALL BOWEL OBSTRUCTION): Status: ACTIVE | Noted: 2023-09-03

## 2023-09-03 PROBLEM — J18.9 HCAP (HEALTHCARE-ASSOCIATED PNEUMONIA): Status: ACTIVE | Noted: 2023-09-03

## 2023-09-03 PROBLEM — R65.21 SEPTIC SHOCK: Status: ACTIVE | Noted: 2023-09-03

## 2023-09-03 PROBLEM — R65.20 SEVERE SEPSIS: Status: ACTIVE | Noted: 2023-09-03

## 2023-09-03 PROBLEM — N30.00 ACUTE CYSTITIS WITHOUT HEMATURIA: Status: ACTIVE | Noted: 2023-09-03

## 2023-09-03 PROBLEM — N12 PYELONEPHRITIS: Status: ACTIVE | Noted: 2023-09-03

## 2023-09-03 PROBLEM — A41.9 SEVERE SEPSIS: Status: ACTIVE | Noted: 2023-09-03

## 2023-09-05 PROBLEM — I48.0 PAF (PAROXYSMAL ATRIAL FIBRILLATION): Status: ACTIVE | Noted: 2023-09-05

## 2023-09-06 PROBLEM — Z71.89 ACP (ADVANCE CARE PLANNING): Status: ACTIVE | Noted: 2023-09-06

## 2023-09-10 PROBLEM — R19.7 DIARRHEA: Status: ACTIVE | Noted: 2023-09-10

## 2023-09-18 PROBLEM — E44.0 MODERATE MALNUTRITION: Status: ACTIVE | Noted: 2023-09-18

## 2023-09-18 PROBLEM — R19.7 NAUSEA VOMITING AND DIARRHEA: Status: ACTIVE | Noted: 2023-09-18

## 2023-09-18 PROBLEM — R11.2 NAUSEA VOMITING AND DIARRHEA: Status: ACTIVE | Noted: 2023-09-18

## 2023-09-28 PROBLEM — A49.8 CLOSTRIDIUM DIFFICILE INFECTION: Status: ACTIVE | Noted: 2023-09-28

## 2023-10-03 PROBLEM — I95.1 ORTHOSTASIS: Status: ACTIVE | Noted: 2023-10-03

## 2023-10-09 PROBLEM — E87.5 HYPERKALEMIA: Status: ACTIVE | Noted: 2023-10-09

## 2023-10-11 PROBLEM — N30.01 ACUTE CYSTITIS WITH HEMATURIA: Status: ACTIVE | Noted: 2023-09-03

## 2023-10-12 PROBLEM — Z91.89 AT HIGH RISK FOR BLEEDING: Status: ACTIVE | Noted: 2023-10-12

## 2023-11-11 ENCOUNTER — HOSPITAL ENCOUNTER (EMERGENCY)
Facility: HOSPITAL | Age: 83
Discharge: HOME | End: 2023-11-11
Attending: STUDENT IN AN ORGANIZED HEALTH CARE EDUCATION/TRAINING PROGRAM
Payer: MEDICARE

## 2023-11-11 VITALS
OXYGEN SATURATION: 95 % | RESPIRATION RATE: 20 BRPM | HEART RATE: 50 BPM | SYSTOLIC BLOOD PRESSURE: 146 MMHG | BODY MASS INDEX: 20.88 KG/M2 | HEIGHT: 69 IN | DIASTOLIC BLOOD PRESSURE: 70 MMHG | TEMPERATURE: 98 F | WEIGHT: 141 LBS

## 2023-11-11 DIAGNOSIS — T83.9XXA FOLEY CATHETER PROBLEM, INITIAL ENCOUNTER: Primary | ICD-10-CM

## 2023-11-11 PROCEDURE — 99282 EMERGENCY DEPT VISIT SF MDM: CPT

## 2023-11-11 RX ORDER — TALC
POWDER (GRAM) TOPICAL
COMMUNITY
Start: 2023-10-30

## 2023-11-11 RX ORDER — METOPROLOL TARTRATE 25 MG/1
12.5 TABLET, FILM COATED ORAL 2 TIMES DAILY
COMMUNITY
Start: 2023-10-30

## 2023-11-11 NOTE — ED NOTES
Removed previous stat lock with alcohol. Skin intact, no redness or irritation noted. Prepped skin, placed new stat lock. Urine drainage noted to leg bag and pt reports no signs of obstruction noted.

## 2023-11-11 NOTE — ED PROVIDER NOTES
Encounter Date: 11/11/2023       History     Chief Complaint   Patient presents with    Purdy Catheter Problem     Needs new securing device for purdy catheter. Denies other complaints.     POV to the ED alone.  Patient presents for replacement of the stat lock Purdy leg bag attachment.  States that last night he was getting up from the table and the stat lock became entangled with the table.  Causing the lock to break.  He was no other complaints.  No abdominal pain, no fever vomiting or diarrhea.  He was a planned urology office visit this coming week.  No other complaints    The history is provided by the patient.     Review of patient's allergies indicates:   Allergen Reactions    Sulfa (sulfonamide antibiotics) Rash     Past Medical History:   Diagnosis Date    Allergy     Arthritis     Cataract     Hypertension      Past Surgical History:   Procedure Laterality Date    BLADDER FULGURATION N/A 8/19/2023    Procedure: FULGURATION, BLADDER;  Surgeon: Abdulaziz Garcia MD;  Location: [facility] OR;  Service: Urology;  Laterality: N/A;    CYSTOSCOPY N/A 8/19/2023    Procedure: CYSTOSCOPY;  Surgeon: Abdulaziz Garcia MD;  Location: [facility] OR;  Service: Urology;  Laterality: N/A;    ESOPHAGOGASTRODUODENOSCOPY N/A 9/20/2023    Procedure: EGD (ESOPHAGOGASTRODUODENOSCOPY);  Surgeon: Varinder Melo MD;  Location: [facility] ENDO;  Service: Gastroenterology;  Laterality: N/A;    HERNIA REPAIR N/A 9/3/2023    Procedure: REPAIR, HERNIA INTERNAL;  Surgeon: Andrew Juares MD;  Location: [facility] OR;  Service: General;  Laterality: N/A;    INSERTION OF CATHETER N/A 8/19/2023    Procedure: INSERTION, CATHETER;  Surgeon: Abdulaziz Garcia MD;  Location: [facility] OR;  Service: Urology;  Laterality: N/A;    knee surgery      right knee    LAPAROSCOPIC ROBOT-ASSISTED SURGICAL REMOVAL OF KIDNEY USING DA EVIN XI Left 8/20/2023    Procedure: XI ROBOTIC NEPHRECTOMY;  Surgeon: Abdulaziz Garcia MD;  Location: [facility] OR;  Service: Urology;   Laterality: Left;    LAPAROTOMY, EXPLORATORY N/A 9/3/2023    Procedure: LAPAROTOMY, EXPLORATORY;  Surgeon: Andrew Juares MD;  Location: [facility] OR;  Service: General;  Laterality: N/A;     Family History   Problem Relation Age of Onset    Heart disease Mother     Cancer Paternal Grandmother         stroke    Cancer Maternal Grandmother         lung cancer    Cancer Maternal Grandfather         brain cancer     Social History     Tobacco Use    Smoking status: Former    Smokeless tobacco: Never   Substance Use Topics    Alcohol use: No    Drug use: No     Review of Systems   Constitutional:  Negative for chills and fever.   Respiratory:  Negative for cough and shortness of breath.    Cardiovascular:  Negative for chest pain.   Genitourinary:         Valdez leg back stat lock broke last night, requesting new stat lock   All other systems reviewed and are negative.      Physical Exam     Initial Vitals [11/11/23 1105]   BP Pulse Resp Temp SpO2   (!) 146/70 (!) 50 20 97.6 °F (36.4 °C) 95 %      MAP       --         Physical Exam    Nursing note and vitals reviewed.  Constitutional: He appears well-developed and well-nourished. No distress.   HENT:   Head: Normocephalic and atraumatic.   Mouth/Throat: Oropharynx is clear and moist.   Eyes: Pupils are equal, round, and reactive to light.   Neck:   Normal range of motion.  Cardiovascular:  Regular rhythm.           bradycardia   Pulmonary/Chest: Breath sounds normal. No respiratory distress.   Abdominal: Abdomen is soft. Bowel sounds are normal.   Genitourinary:    Genitourinary Comments: Stat lock attachment left thigh is broke     Musculoskeletal:         General: Normal range of motion.      Cervical back: Normal range of motion.     Neurological: He is alert and oriented to person, place, and time. He has normal strength. GCS score is 15. GCS eye subscore is 4. GCS verbal subscore is 5. GCS motor subscore is 6.   Skin: Skin is warm and dry. Capillary refill takes 2 to  3 seconds.   Psychiatric: He has a normal mood and affect. Thought content normal.         ED Course   Procedures  Labs Reviewed - No data to display       Imaging Results    None          Medications - No data to display  Medical Decision Making  Presents for evaluation of Valdez leg bag attachment, needing a new stat lock.  No other complaints.  Disposition pending  Differentials including but not limited to Valdez leg bag problem, UTI  Patient will be discharged home.  Diagnosis Valdez catheter problem.  The stat lock is replaced for the patient.  He was no complaints.  Declines need for urinalysis collection and testing.  He will follow up with his urologist as planned this week.  He agrees to return as needed                               Clinical Impression:   Final diagnoses:  [T83.9XXA] Valdez catheter problem, initial encounter - replacement of statlock (Primary)        ED Disposition Condition    Discharge Stable          ED Prescriptions    None       Follow-up Information       Follow up With Specialties Details Why Contact Info    Khanh Chang MD Family Medicine Call in 3 days  [address]  [facility] [place] [address]  [phone]      Your urologist as planned next week  Call in 3 days               Rosalind Melton NP  11/11/23 1345

## 2023-11-17 ENCOUNTER — OFFICE VISIT (OUTPATIENT)
Dept: UROLOGY | Facility: CLINIC | Age: 83
End: 2023-11-17
Payer: MEDICARE

## 2023-11-17 VITALS
HEART RATE: 60 BPM | WEIGHT: 142 LBS | BODY MASS INDEX: 21.03 KG/M2 | SYSTOLIC BLOOD PRESSURE: 120 MMHG | DIASTOLIC BLOOD PRESSURE: 70 MMHG | HEIGHT: 69 IN

## 2023-11-17 DIAGNOSIS — R33.9 URINARY RETENTION: Primary | ICD-10-CM

## 2023-11-17 DIAGNOSIS — C64.2 RENAL CELL CARCINOMA OF LEFT KIDNEY: ICD-10-CM

## 2023-11-17 PROCEDURE — 3074F PR MOST RECENT SYSTOLIC BLOOD PRESSURE < 130 MM HG: ICD-10-PCS | Mod: CPTII,S$GLB,, | Performed by: UROLOGY

## 2023-11-17 PROCEDURE — 1160F RVW MEDS BY RX/DR IN RCRD: CPT | Mod: CPTII,S$GLB,, | Performed by: UROLOGY

## 2023-11-17 PROCEDURE — 3074F SYST BP LT 130 MM HG: CPT | Mod: CPTII,S$GLB,, | Performed by: UROLOGY

## 2023-11-17 PROCEDURE — 1101F PT FALLS ASSESS-DOCD LE1/YR: CPT | Mod: CPTII,S$GLB,, | Performed by: UROLOGY

## 2023-11-17 PROCEDURE — 1126F AMNT PAIN NOTED NONE PRSNT: CPT | Mod: CPTII,S$GLB,, | Performed by: UROLOGY

## 2023-11-17 PROCEDURE — 3078F PR MOST RECENT DIASTOLIC BLOOD PRESSURE < 80 MM HG: ICD-10-PCS | Mod: CPTII,S$GLB,, | Performed by: UROLOGY

## 2023-11-17 PROCEDURE — 3288F PR FALLS RISK ASSESSMENT DOCUMENTED: ICD-10-PCS | Mod: CPTII,S$GLB,, | Performed by: UROLOGY

## 2023-11-17 PROCEDURE — 3078F DIAST BP <80 MM HG: CPT | Mod: CPTII,S$GLB,, | Performed by: UROLOGY

## 2023-11-17 PROCEDURE — 99205 PR OFFICE/OUTPT VISIT, NEW, LEVL V, 60-74 MIN: ICD-10-PCS | Mod: S$GLB,,, | Performed by: UROLOGY

## 2023-11-17 PROCEDURE — 3288F FALL RISK ASSESSMENT DOCD: CPT | Mod: CPTII,S$GLB,, | Performed by: UROLOGY

## 2023-11-17 PROCEDURE — 99999 PR PBB SHADOW E&M-EST. PATIENT-LVL IV: ICD-10-PCS | Mod: PBBFAC,,, | Performed by: UROLOGY

## 2023-11-17 PROCEDURE — 99999 PR PBB SHADOW E&M-EST. PATIENT-LVL IV: CPT | Mod: PBBFAC,,, | Performed by: UROLOGY

## 2023-11-17 PROCEDURE — 1159F PR MEDICATION LIST DOCUMENTED IN MEDICAL RECORD: ICD-10-PCS | Mod: CPTII,S$GLB,, | Performed by: UROLOGY

## 2023-11-17 PROCEDURE — 1160F PR REVIEW ALL MEDS BY PRESCRIBER/CLIN PHARMACIST DOCUMENTED: ICD-10-PCS | Mod: CPTII,S$GLB,, | Performed by: UROLOGY

## 2023-11-17 PROCEDURE — 1101F PR PT FALLS ASSESS DOC 0-1 FALLS W/OUT INJ PAST YR: ICD-10-PCS | Mod: CPTII,S$GLB,, | Performed by: UROLOGY

## 2023-11-17 PROCEDURE — 99205 OFFICE O/P NEW HI 60 MIN: CPT | Mod: S$GLB,,, | Performed by: UROLOGY

## 2023-11-17 PROCEDURE — 1126F PR PAIN SEVERITY QUANTIFIED, NO PAIN PRESENT: ICD-10-PCS | Mod: CPTII,S$GLB,, | Performed by: UROLOGY

## 2023-11-17 PROCEDURE — 1159F MED LIST DOCD IN RCRD: CPT | Mod: CPTII,S$GLB,, | Performed by: UROLOGY

## 2023-11-17 NOTE — PROGRESS NOTES
[facility] Urology New Patient/H&P:    Josh Pinto is a 83 y.o. male who presents for urinary retention.    Patient with a several year history of left renal RCC and recurrent urinary retention who presents to [facility].     He was previously managed by Dr. Escobar and Dr. Garcia. On review of records, he underwent robotic radical left nephrectomy on 8/20/23 with Dr. Garcia. 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      He reports having a catheter placed prior to his surgery that has remained in place. He was offered TURP, but states  he no longer wants to follow up with Dr. Garcia.     Patient also required a catheter in 2015 that was managed by Dr. Escobar. He was on Cardura 4 mg. Also has a history of elevated PSA. He is now only managed with Finasteride 5 mg PO daily. He has a prescription for Flomax, but has stopped due to hypotension.     He underwent cystoscopy and bladder fulguration on 8/19/23 with Dr. Garcia. 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 Prostate volume 280 cc on US bladder on 9/25/23. CT abdomen pelvis without contrast on 9/17/23 with no recurrence of his renal cell carcinoma.      Denies any fever, chills, gross hematuria, flank pain, bone pain, unintentional weight loss,  trauma or history of  malignancy.       IPSS QoL  34 5 11/17/23    PSA  3.5  8/15/23    Past Medical History:   Diagnosis Date    Allergy     Arthritis     Cataract     Hypertension        Past Surgical History:   Procedure Laterality Date    BLADDER FULGURATION N/A 8/19/2023    Procedure: FULGURATION, BLADDER;  Surgeon: Abdulaziz Garcia MD;  Location: [facility] OR;  Service: Urology;  Laterality: N/A;    CYSTOSCOPY N/A 8/19/2023    Procedure: CYSTOSCOPY;  Surgeon: Abdulaziz Garcia MD;  Location: [facility] OR;  Service: Urology;   "Laterality: N/A;    ESOPHAGOGASTRODUODENOSCOPY N/A 9/20/2023    Procedure: EGD (ESOPHAGOGASTRODUODENOSCOPY);  Surgeon: Varinder Melo MD;  Location: [facility] ENDO;  Service: Gastroenterology;  Laterality: N/A;    HERNIA REPAIR N/A 9/3/2023    Procedure: REPAIR, HERNIA INTERNAL;  Surgeon: Andrew Juares MD;  Location: [facility] OR;  Service: General;  Laterality: N/A;    INSERTION OF CATHETER N/A 8/19/2023    Procedure: INSERTION, CATHETER;  Surgeon: Abdulaziz Garcia MD;  Location: [facility] OR;  Service: Urology;  Laterality: N/A;    knee surgery      right knee    LAPAROSCOPIC ROBOT-ASSISTED SURGICAL REMOVAL OF KIDNEY USING DA EVIN XI Left 8/20/2023    Procedure: XI ROBOTIC NEPHRECTOMY;  Surgeon: Abdulaziz Garcia MD;  Location: [facility] OR;  Service: Urology;  Laterality: Left;    LAPAROTOMY, EXPLORATORY N/A 9/3/2023    Procedure: LAPAROTOMY, EXPLORATORY;  Surgeon: Andrew Juares MD;  Location: [facility] OR;  Service: General;  Laterality: N/A;       Family History   Problem Relation Age of Onset    Heart disease Mother     Cancer Paternal Grandmother         stroke    Cancer Maternal Grandmother         lung cancer    Cancer Maternal Grandfather         brain cancer       Review of patient's allergies indicates:   Allergen Reactions    Sulfa (sulfonamide antibiotics) Rash       Medications Reviewed: see MAR      FOCUSED PHYSICAL EXAM:    Vitals:    11/17/23 1340   BP: 120/70   Pulse: 60     Body mass index is 20.97 kg/m². Weight: 64.4 kg (142 lb) Height: 5' 9" (175.3 cm)       General: Alert, cooperative, no distress, appears stated age  Abdomen: Soft, non-tender, no CVA tenderness, non-distended  : Valdez in place with clear urine      LABS:    No results found for this or any previous visit (from the past 336 hour(s)).        Assessment/Diagnosis:    1. Urinary retention        2. Renal cell carcinoma of left kidney            Plans:    - I spent 60 minutes of the day of this encounter preparing for, treating and " managing this patient. Extensive discussion with patient regarding the etiology and management of his lower urinary tract symptoms. Explained that LUTS are multifactorial and can be secondary to an enlarged prostate, PO intake of bladder irritants, overactive bladder, constipation, malignancy, trauma, infection, stones or medications. We discussed that his prostate volume is 280 cc. Thus he is not a candidate for TURP or any minimally invasive surgeries. Not a good surgical candidate for simple prostatectomy at this time.   - Continue Finasteride 5 mg PO daily.   - Continue home health catheter exchanges every 4 weeks.   - RTC in 6 weeks for voiding trial as nurse visit in AM.   - Will need contrasted imaging for his history of RCC in 3 - 6 months.   - RTC in 3 months with symptom score and PVR.

## 2023-11-20 PROBLEM — G89.18 POST-OP PAIN: Status: RESOLVED | Noted: 2023-08-20 | Resolved: 2023-11-20

## 2023-11-20 PROBLEM — J96.01 ACUTE HYPOXEMIC RESPIRATORY FAILURE: Status: RESOLVED | Noted: 2023-08-21 | Resolved: 2023-11-20

## 2023-11-27 ENCOUNTER — TELEPHONE (OUTPATIENT)
Dept: UROLOGY | Facility: CLINIC | Age: 83
End: 2023-11-27
Payer: MEDICARE

## 2023-11-27 NOTE — TELEPHONE ENCOUNTER
----- Message from Kimmarciano Doshi sent at 11/27/2023 10:52 AM CST -----  Regarding: advise  Contact: home nurse ángela  Type: Needs Medical Advice  Who Called:  home nurse Phoenix motta  Symptoms (please be specific):    How long has patient had these symptoms:   Pharmacy name and phone #:    Best Call Back Number: [phone]  Additional Information: jenny morrissey, i have a home nurse on the line regarding pt catherder leaking mainly when laying down needs to see if meds should be prescriped MRN: [id number] gaby mendiola.

## 2023-11-28 NOTE — TELEPHONE ENCOUNTER
Spoke with ángela for home health and informed her of recommendations from . she verbalized understanding

## 2023-11-30 ENCOUNTER — HOSPITAL ENCOUNTER (EMERGENCY)
Facility: HOSPITAL | Age: 83
Discharge: HOME | End: 2023-11-30
Attending: EMERGENCY MEDICINE
Payer: MEDICARE

## 2023-11-30 VITALS
SYSTOLIC BLOOD PRESSURE: 124 MMHG | RESPIRATION RATE: 14 BRPM | DIASTOLIC BLOOD PRESSURE: 80 MMHG | OXYGEN SATURATION: 96 % | HEART RATE: 67 BPM | BODY MASS INDEX: 20.44 KG/M2 | TEMPERATURE: 98 F | WEIGHT: 138 LBS | HEIGHT: 69 IN

## 2023-11-30 DIAGNOSIS — N39.0 URINARY TRACT INFECTION WITHOUT HEMATURIA, SITE UNSPECIFIED: Primary | ICD-10-CM

## 2023-11-30 DIAGNOSIS — R55 SYNCOPE: ICD-10-CM

## 2023-11-30 DIAGNOSIS — Z97.8 INDWELLING FOLEY CATHETER PRESENT: ICD-10-CM

## 2023-11-30 LAB
ALBUMIN SERPL BCP-MCNC: 2.4 G/DL (ref 3.5–5.2)
ALP SERPL-CCNC: 250 U/L (ref 55–135)
ALT SERPL W/O P-5'-P-CCNC: 37 U/L (ref 10–44)
ANION GAP SERPL CALC-SCNC: 14 MMOL/L (ref 8–16)
AST SERPL-CCNC: 30 U/L (ref 10–40)
BACTERIA #/AREA URNS HPF: ABNORMAL /HPF
BASOPHILS # BLD AUTO: 0.04 K/UL (ref 0–0.2)
BASOPHILS NFR BLD: 0.3 % (ref 0–1.9)
BILIRUB SERPL-MCNC: 0.8 MG/DL (ref 0.1–1)
BILIRUB UR QL STRIP: NEGATIVE
BUN SERPL-MCNC: 30 MG/DL (ref 8–23)
CALCIUM SERPL-MCNC: 9.1 MG/DL (ref 8.7–10.5)
CHLORIDE SERPL-SCNC: 100 MMOL/L (ref 95–110)
CLARITY UR: ABNORMAL
CO2 SERPL-SCNC: 18 MMOL/L (ref 23–29)
COLOR UR: YELLOW
CREAT SERPL-MCNC: 2.2 MG/DL (ref 0.5–1.4)
DIFFERENTIAL METHOD: ABNORMAL
EOSINOPHIL # BLD AUTO: 0.1 K/UL (ref 0–0.5)
EOSINOPHIL NFR BLD: 0.5 % (ref 0–8)
ERYTHROCYTE [DISTWIDTH] IN BLOOD BY AUTOMATED COUNT: 17.9 % (ref 11.5–14.5)
EST. GFR  (NO RACE VARIABLE): 29.4 ML/MIN/1.73 M^2
GLUCOSE SERPL-MCNC: 161 MG/DL (ref 70–110)
GLUCOSE UR QL STRIP: NEGATIVE
HCT VFR BLD AUTO: 34.1 % (ref 40–54)
HGB BLD-MCNC: 10.8 G/DL (ref 14–18)
HGB UR QL STRIP: ABNORMAL
HYALINE CASTS #/AREA URNS LPF: 0 /LPF
IMM GRANULOCYTES # BLD AUTO: 0.09 K/UL (ref 0–0.04)
IMM GRANULOCYTES NFR BLD AUTO: 0.6 % (ref 0–0.5)
INFLUENZA A, MOLECULAR: NEGATIVE
INFLUENZA B, MOLECULAR: NEGATIVE
KETONES UR QL STRIP: NEGATIVE
LACTATE SERPL-SCNC: 1.8 MMOL/L (ref 0.5–2.2)
LEUKOCYTE ESTERASE UR QL STRIP: ABNORMAL
LYMPHOCYTES # BLD AUTO: 0.5 K/UL (ref 1–4.8)
LYMPHOCYTES NFR BLD: 3.5 % (ref 18–48)
MAGNESIUM SERPL-MCNC: 1.5 MG/DL (ref 1.6–2.6)
MCH RBC QN AUTO: 28.6 PG (ref 27–31)
MCHC RBC AUTO-ENTMCNC: 31.7 G/DL (ref 32–36)
MCV RBC AUTO: 91 FL (ref 82–98)
MICROSCOPIC COMMENT: ABNORMAL
MONOCYTES # BLD AUTO: 1.2 K/UL (ref 0.3–1)
MONOCYTES NFR BLD: 8.3 % (ref 4–15)
NEUTROPHILS # BLD AUTO: 12.5 K/UL (ref 1.8–7.7)
NEUTROPHILS NFR BLD: 86.8 % (ref 38–73)
NITRITE UR QL STRIP: NEGATIVE
NRBC BLD-RTO: 0 /100 WBC
PH UR STRIP: 7 [PH] (ref 5–8)
PLATELET # BLD AUTO: 398 K/UL (ref 150–450)
PMV BLD AUTO: 10.6 FL (ref 9.2–12.9)
POCT GLUCOSE: 175 MG/DL (ref 70–110)
POTASSIUM SERPL-SCNC: 4.6 MMOL/L (ref 3.5–5.1)
PROT SERPL-MCNC: 7.3 G/DL (ref 6–8.4)
PROT UR QL STRIP: ABNORMAL
RBC # BLD AUTO: 3.77 M/UL (ref 4.6–6.2)
RBC #/AREA URNS HPF: >100 /HPF (ref 0–4)
SARS-COV-2 RDRP RESP QL NAA+PROBE: NEGATIVE
SODIUM SERPL-SCNC: 132 MMOL/L (ref 136–145)
SP GR UR STRIP: 1.02 (ref 1–1.03)
SPECIMEN SOURCE: NORMAL
TROPONIN I SERPL DL<=0.01 NG/ML-MCNC: <0.006 NG/ML (ref 0–0.03)
URN SPEC COLLECT METH UR: ABNORMAL
UROBILINOGEN UR STRIP-ACNC: NEGATIVE EU/DL
WBC # BLD AUTO: 14.37 K/UL (ref 3.9–12.7)
WBC #/AREA URNS HPF: >100 /HPF (ref 0–5)

## 2023-11-30 PROCEDURE — 71045 X-RAY EXAM CHEST 1 VIEW: CPT | Mod: TC

## 2023-11-30 PROCEDURE — 25000003 PHARM REV CODE 250: Performed by: EMERGENCY MEDICINE

## 2023-11-30 PROCEDURE — 93010 ELECTROCARDIOGRAM REPORT: CPT | Mod: ,,, | Performed by: INTERNAL MEDICINE

## 2023-11-30 PROCEDURE — 93010 EKG 12-LEAD: ICD-10-PCS | Mod: ,,, | Performed by: INTERNAL MEDICINE

## 2023-11-30 PROCEDURE — 99285 EMERGENCY DEPT VISIT HI MDM: CPT | Mod: 25

## 2023-11-30 PROCEDURE — 87077 CULTURE AEROBIC IDENTIFY: CPT | Performed by: EMERGENCY MEDICINE

## 2023-11-30 PROCEDURE — 71045 XR CHEST AP PORTABLE: ICD-10-PCS | Mod: 26,,, | Performed by: RADIOLOGY

## 2023-11-30 PROCEDURE — 87502 INFLUENZA DNA AMP PROBE: CPT | Performed by: EMERGENCY MEDICINE

## 2023-11-30 PROCEDURE — 83605 ASSAY OF LACTIC ACID: CPT | Performed by: EMERGENCY MEDICINE

## 2023-11-30 PROCEDURE — 84484 ASSAY OF TROPONIN QUANT: CPT | Performed by: EMERGENCY MEDICINE

## 2023-11-30 PROCEDURE — 93005 ELECTROCARDIOGRAM TRACING: CPT

## 2023-11-30 PROCEDURE — 87086 URINE CULTURE/COLONY COUNT: CPT | Performed by: EMERGENCY MEDICINE

## 2023-11-30 PROCEDURE — 82962 GLUCOSE BLOOD TEST: CPT

## 2023-11-30 PROCEDURE — 80053 COMPREHEN METABOLIC PANEL: CPT | Performed by: EMERGENCY MEDICINE

## 2023-11-30 PROCEDURE — 87186 SC STD MICRODIL/AGAR DIL: CPT | Mod: 59 | Performed by: EMERGENCY MEDICINE

## 2023-11-30 PROCEDURE — 83735 ASSAY OF MAGNESIUM: CPT | Performed by: EMERGENCY MEDICINE

## 2023-11-30 PROCEDURE — 85025 COMPLETE CBC W/AUTO DIFF WBC: CPT | Performed by: EMERGENCY MEDICINE

## 2023-11-30 PROCEDURE — 96365 THER/PROPH/DIAG IV INF INIT: CPT

## 2023-11-30 PROCEDURE — 87088 URINE BACTERIA CULTURE: CPT | Performed by: EMERGENCY MEDICINE

## 2023-11-30 PROCEDURE — 71045 X-RAY EXAM CHEST 1 VIEW: CPT | Mod: 26,,, | Performed by: RADIOLOGY

## 2023-11-30 PROCEDURE — 63600175 PHARM REV CODE 636 W HCPCS: Performed by: EMERGENCY MEDICINE

## 2023-11-30 PROCEDURE — U0002 COVID-19 LAB TEST NON-CDC: HCPCS | Performed by: EMERGENCY MEDICINE

## 2023-11-30 PROCEDURE — 81000 URINALYSIS NONAUTO W/SCOPE: CPT | Performed by: EMERGENCY MEDICINE

## 2023-11-30 RX ORDER — SUCRALFATE 1 G/1
1 TABLET ORAL 4 TIMES DAILY
COMMUNITY
Start: 2023-11-20

## 2023-11-30 RX ORDER — CEPHALEXIN 250 MG/1
250 CAPSULE ORAL 4 TIMES DAILY
Qty: 28 CAPSULE | Refills: 0 | Status: SHIPPED | OUTPATIENT
Start: 2023-11-30 | End: 2023-12-07

## 2023-11-30 RX ADMIN — CEFTRIAXONE 1 G: 1 INJECTION, POWDER, FOR SOLUTION INTRAMUSCULAR; INTRAVENOUS at 01:11

## 2023-11-30 RX ADMIN — SODIUM CHLORIDE 1000 ML: 9 INJECTION, SOLUTION INTRAVENOUS at 01:11

## 2023-11-30 NOTE — ED PROVIDER NOTES
Encounter Date: 11/30/2023       History     Chief Complaint   Patient presents with    Loss of Consciousness     Syncopal episode at PMD office this morning     83-year-old male, here from his primary care's office after he had a syncopal episode while standing at the counter after a visit with his primary care provider.  Patient denies any chest pain or palpitations.  No nausea or vomiting, no abdominal pain, no fevers or chills.  Patient has a history of syncope in the past as well.  Patient has several medical issues, including left renal carcinoma with nephrectomy.  Also has an enlarged prostate and has an indwelling Valdez catheter as result.  Denies any recent illnesses.  States he feels fine now.      Review of patient's allergies indicates:   Allergen Reactions    Sulfa (sulfonamide antibiotics) Rash     Past Medical History:   Diagnosis Date    Allergy     Arthritis     Cataract     Hypertension      Past Surgical History:   Procedure Laterality Date    BLADDER FULGURATION N/A 8/19/2023    Procedure: FULGURATION, BLADDER;  Surgeon: Abdulaziz Garcia MD;  Location: [facility] OR;  Service: Urology;  Laterality: N/A;    CYSTOSCOPY N/A 8/19/2023    Procedure: CYSTOSCOPY;  Surgeon: Abdulaziz Garcia MD;  Location: [facility] OR;  Service: Urology;  Laterality: N/A;    ESOPHAGOGASTRODUODENOSCOPY N/A 9/20/2023    Procedure: EGD (ESOPHAGOGASTRODUODENOSCOPY);  Surgeon: Varinder Melo MD;  Location: [facility] ENDO;  Service: Gastroenterology;  Laterality: N/A;    HERNIA REPAIR N/A 9/3/2023    Procedure: REPAIR, HERNIA INTERNAL;  Surgeon: Andrew Juares MD;  Location: [facility] OR;  Service: General;  Laterality: N/A;    INSERTION OF CATHETER N/A 8/19/2023    Procedure: INSERTION, CATHETER;  Surgeon: Abdulaziz Garcia MD;  Location: [facility] OR;  Service: Urology;  Laterality: N/A;    knee surgery      right knee    LAPAROSCOPIC ROBOT-ASSISTED SURGICAL REMOVAL OF KIDNEY USING DA EVIN XI Left 8/20/2023    Procedure: XI ROBOTIC  NEPHRECTOMY;  Surgeon: Abdulaziz Garcia MD;  [facility] OR;  Service: Urology;  Laterality: Left;    LAPAROTOMY, EXPLORATORY N/A 9/3/2023    Procedure: LAPAROTOMY, EXPLORATORY;  Surgeon: Andrew Juares MD;  [facility] OR;  Service: General;  Laterality: N/A;     Family History   Problem Relation Age of Onset    Heart disease Mother     Cancer Paternal Grandmother         stroke    Cancer Maternal Grandmother         lung cancer    Cancer Maternal Grandfather         brain cancer     Social History     Tobacco Use    Smoking status: Former    Smokeless tobacco: Never   Substance Use Topics    Alcohol use: No    Drug use: No     Review of Systems   Constitutional: Negative.    HENT: Negative.     Eyes: Negative.    Respiratory: Negative.     Cardiovascular: Negative.    Gastrointestinal: Negative.    Endocrine: Negative.    Genitourinary: Negative.    Musculoskeletal: Negative.    Skin: Negative.    Allergic/Immunologic: Negative.    Neurological:  Positive for syncope.   Psychiatric/Behavioral: Negative.         Physical Exam     Initial Vitals   BP Pulse Resp Temp SpO2   11/30/23 1010 11/30/23 1008 11/30/23 1008 11/30/23 1008 11/30/23 1008   114/64 82 20 98 °F (36.7 °C) (!) 92 %      MAP       --                Physical Exam    Nursing note and vitals reviewed.  Constitutional: He appears well-developed and well-nourished. He is not diaphoretic. No distress.   HENT:   Head: Normocephalic and atraumatic.   Nose: Nose normal.   Mouth/Throat: Oropharynx is clear and moist. No oropharyngeal exudate.   Eyes: Conjunctivae and EOM are normal. Pupils are equal, round, and reactive to light. No scleral icterus.   Neck: Neck supple. No JVD present.   Normal range of motion.  Cardiovascular:  Normal rate, regular rhythm, normal heart sounds and intact distal pulses.           No murmur heard.  Pulmonary/Chest: Breath sounds normal. No stridor. No respiratory distress. He has no wheezes. He has no rhonchi. He has  no rales.   Abdominal: Abdomen is soft. Bowel sounds are normal. He exhibits no distension. There is no abdominal tenderness.   Genitourinary:    Genitourinary Comments: Valdez catheter in place. Genitalia otherwise normal     Musculoskeletal:         General: No tenderness or edema. Normal range of motion.      Cervical back: Normal range of motion and neck supple.     Neurological: He is alert and oriented to person, place, and time. He has normal strength. No cranial nerve deficit or sensory deficit. GCS score is 15. GCS eye subscore is 4. GCS verbal subscore is 5. GCS motor subscore is 6.   Skin: Skin is warm and dry. Capillary refill takes less than 2 seconds. No rash noted. No erythema.   Psychiatric: He has a normal mood and affect. His behavior is normal.         ED Course   Procedures  Labs Reviewed   CBC W/ AUTO DIFFERENTIAL - Abnormal; Notable for the following components:       Result Value    WBC 14.37 (*)     RBC 3.77 (*)     Hemoglobin 10.8 (*)     Hematocrit 34.1 (*)     MCHC 31.7 (*)     RDW 17.9 (*)     Immature Granulocytes 0.6 (*)     Gran # (ANC) 12.5 (*)     Immature Grans (Abs) 0.09 (*)     Lymph # 0.5 (*)     Mono # 1.2 (*)     Gran % 86.8 (*)     Lymph % 3.5 (*)     All other components within normal limits   COMPREHENSIVE METABOLIC PANEL - Abnormal; Notable for the following components:    Sodium 132 (*)     CO2 18 (*)     Glucose 161 (*)     BUN 30 (*)     Creatinine 2.2 (*)     Albumin 2.4 (*)     Alkaline Phosphatase 250 (*)     eGFR 29.4 (*)     All other components within normal limits   URINALYSIS, REFLEX TO URINE CULTURE - Abnormal; Notable for the following components:    Appearance, UA Cloudy (*)     Protein, UA 2+ (*)     Occult Blood UA 2+ (*)     Leukocytes, UA 1+ (*)     All other components within normal limits    Narrative:     Preferred Collection Type->Urine, Clean Catch  Specimen Source->Urine   MAGNESIUM - Abnormal; Notable for the following components:    Magnesium 1.5  (*)     All other components within normal limits   URINALYSIS MICROSCOPIC - Abnormal; Notable for the following components:    RBC, UA >100 (*)     WBC, UA >100 (*)     Bacteria Many (*)     All other components within normal limits    Narrative:     Preferred Collection Type->Urine, Clean Catch  Specimen Source->Urine   POCT GLUCOSE - Abnormal; Notable for the following components:    POCT Glucose 175 (*)     All other components within normal limits   INFLUENZA A & B BY MOLECULAR   CULTURE, URINE   TROPONIN I   SARS-COV-2 RNA AMPLIFICATION, QUAL    Narrative:     Is the patient symptomatic?->No   LACTIC ACID, PLASMA     EKG Readings: (Independently Interpreted)   EKG personally reviewed by me shows normal sinus rhythm at 67 beats per minute.  WY interval 158, .  No ST elevation or depression, no T-wave changes, no arrhythmia.     ECG Results              EKG 12-lead (Final result)  Result time 11/30/23 13:50:48      Final result by Interface, Lab In [facility] (11/30/23 13:50:48)                   Narrative:    Test Reason : R55,    Vent. Rate : 067 BPM     Atrial Rate : 067 BPM     P-R Int : 158 ms          QRS Dur : 082 ms      QT Int : 442 ms       P-R-T Axes : 078 053 078 degrees     QTc Int : 467 ms    Normal sinus rhythm  Normal ECG  When compared with ECG of 09-OCT-2023 12:36,  No significant change was found  Confirmed by Johnathan Angel MD ([id number]) on 11/30/2023 1:50:39 PM    Referred By: [email or web address]   SELF           Confirmed By:Johnathan Angel MD                                  Imaging Results              X-Ray Chest AP Portable (Final result)  Result time 11/30/23 10:59:25      Final result by Toshia Yanez MD (11/30/23 10:59:25)                   Impression:      No acute cardiopulmonary disease.      Electronically signed by: Toshia Yanez  Date:    11/30/2023  Time:    10:59               Narrative:    EXAMINATION:  XR CHEST AP PORTABLE    CLINICAL HISTORY:  Syncope and  collapse    TECHNIQUE:  Single frontal view of the chest was performed.    COMPARISON:  09/17/2023    FINDINGS:  Cardiomediastinal silhouette within normal limits.  Lungs clear.  No pleural effusion or pneumothorax.                                       Medications   cefTRIAXone (ROCEPHIN) 1 g in dextrose 5 % in water (D5W) 100 mL IVPB (MB+) (0 g Intravenous Stopped 11/30/23 1405)   sodium chloride 0.9% bolus 1,000 mL 1,000 mL (0 mLs Intravenous Stopped 11/30/23 1426)     Medical Decision Making  Differential includes COVID-19, influenza, other viral illness, sepsis, UTI, cardiac arrhythmia, myocardial infarction, etc.    Patient's labs unremarkable for the most part.  He does appear to have a urinary tract infection.  His vital signs here are good, he voices no complaints and states he feels normal.  Patient has been given 1 g Rocephin here, and IV fluids.  Orthostatic vital signs were normal.  I believe he is safe for discharge home.  Will prescribe Keflex for UTI patient will follow-up with his primary care provider and his urologist.  Return here for any worsening signs or symptoms.    Amount and/or Complexity of Data Reviewed  Labs: ordered.  Radiology: ordered.    Risk  Prescription drug management.                                      Clinical Impression:  Final diagnoses:  [R55] Syncope  [N39.0] Urinary tract infection without hematuria, site unspecified (Primary)  [Z97.8] Indwelling Valdez catheter present          ED Disposition Condition    Discharge Stable          ED Prescriptions       Medication Sig Dispense Start Date End Date Auth. Provider    cephALEXin (KEFLEX) 250 MG capsule Take 1 capsule (250 mg total) by mouth 4 (four) times daily. for 7 days 28 capsule 11/30/2023 12/7/2023 Yuri Tavarez MD          Follow-up Information       Follow up With Specialties Details Why Contact Info    Khanh Chang MD Family Medicine Call today  [id number]  [facility] [address] [phone]      Your  urologist  Today      [facility] - Emergency Dept Emergency Medicine  As needed, If symptoms worsen [address] [facility] [address] [phone]             Yuri Tavarez MD  11/30/23 5524       Yuri Tavarez MD  11/30/23 [id number]

## 2023-11-30 NOTE — DISCHARGE INSTRUCTIONS
Take Keflex as prescribed for urinary tract infection.  Continue previously prescribed medications and treatments, and make sure you stay well hydrated by drinking lots of fluids.  Follow-up with your primary care provider and your urologist and discuss your visit to the emergency department with them.  Return here as needed or if worse in any way.

## 2023-12-03 LAB
BACTERIA UR CULT: ABNORMAL
BACTERIA UR CULT: ABNORMAL

## 2023-12-04 PROBLEM — N17.9 AKI (ACUTE KIDNEY INJURY): Status: RESOLVED | Noted: 2023-08-18 | Resolved: 2023-12-04

## 2023-12-04 PROBLEM — R65.21 SEPTIC SHOCK: Status: RESOLVED | Noted: 2023-09-03 | Resolved: 2023-12-04

## 2023-12-04 PROBLEM — J18.9 HCAP (HEALTHCARE-ASSOCIATED PNEUMONIA): Status: RESOLVED | Noted: 2023-09-03 | Resolved: 2023-12-04

## 2023-12-04 PROBLEM — A41.9 SEPTIC SHOCK: Status: RESOLVED | Noted: 2023-09-03 | Resolved: 2023-12-04

## 2023-12-13 ENCOUNTER — TELEPHONE (OUTPATIENT)
Dept: UROLOGY | Facility: CLINIC | Age: 83
End: 2023-12-13
Payer: MEDICARE

## 2023-12-13 NOTE — TELEPHONE ENCOUNTER
----- Message from Rhea Veliz sent at 12/13/2023  9:10 AM CST -----  Contact: PT  Type:  Same Day Appointment Request    Caller is requesting a same day appointment.  Caller declined first available appointment listed below.      Name of Caller:  PT  When is the first available appointment?  2/2/24  Symptoms:  Trouble with catheter  Best Call Back Number:  [phone]  Additional Information:

## 2023-12-14 ENCOUNTER — OFFICE VISIT (OUTPATIENT)
Dept: CARDIOLOGY | Facility: CLINIC | Age: 83
End: 2023-12-14
Payer: MEDICARE

## 2023-12-14 ENCOUNTER — LAB VISIT (OUTPATIENT)
Dept: LAB | Facility: HOSPITAL | Age: 83
End: 2023-12-14
Attending: INTERNAL MEDICINE
Payer: MEDICARE

## 2023-12-14 VITALS
HEART RATE: 105 BPM | OXYGEN SATURATION: 97 % | WEIGHT: 140 LBS | SYSTOLIC BLOOD PRESSURE: 102 MMHG | HEIGHT: 69 IN | DIASTOLIC BLOOD PRESSURE: 64 MMHG | BODY MASS INDEX: 20.73 KG/M2

## 2023-12-14 DIAGNOSIS — I48.0 PAF (PAROXYSMAL ATRIAL FIBRILLATION): Primary | ICD-10-CM

## 2023-12-14 DIAGNOSIS — Z91.89 AT RISK FOR CARDIOVASCULAR EVENT: ICD-10-CM

## 2023-12-14 DIAGNOSIS — D62 ACUTE BLOOD LOSS ANEMIA: ICD-10-CM

## 2023-12-14 DIAGNOSIS — N18.4 CHRONIC KIDNEY DISEASE, STAGE 4 (SEVERE): ICD-10-CM

## 2023-12-14 DIAGNOSIS — I10 PRIMARY HYPERTENSION: ICD-10-CM

## 2023-12-14 DIAGNOSIS — Z79.899 LONG TERM CURRENT USE OF AMIODARONE: ICD-10-CM

## 2023-12-14 DIAGNOSIS — Z82.0 FAMILY HISTORY OF SLEEP APNEA: ICD-10-CM

## 2023-12-14 DIAGNOSIS — I48.0 PAF (PAROXYSMAL ATRIAL FIBRILLATION): ICD-10-CM

## 2023-12-14 DIAGNOSIS — C64.2 RENAL CELL CARCINOMA OF LEFT KIDNEY: ICD-10-CM

## 2023-12-14 DIAGNOSIS — E46 HYPOALBUMINEMIA DUE TO PROTEIN-CALORIE MALNUTRITION: ICD-10-CM

## 2023-12-14 DIAGNOSIS — R94.31 NONSPECIFIC ABNORMAL ELECTROCARDIOGRAM (ECG) (EKG): ICD-10-CM

## 2023-12-14 DIAGNOSIS — Z97.8 INDWELLING FOLEY CATHETER PRESENT: ICD-10-CM

## 2023-12-14 DIAGNOSIS — G47.19 EXCESSIVE DAYTIME SLEEPINESS: ICD-10-CM

## 2023-12-14 DIAGNOSIS — Z87.891 HISTORY OF SMOKING GREATER THAN 50 PACK YEARS: ICD-10-CM

## 2023-12-14 DIAGNOSIS — E88.09 HYPOALBUMINEMIA DUE TO PROTEIN-CALORIE MALNUTRITION: ICD-10-CM

## 2023-12-14 LAB
CHOLEST SERPL-MCNC: 138 MG/DL (ref 120–199)
CHOLEST/HDLC SERPL: 3.7 {RATIO} (ref 2–5)
HDLC SERPL-MCNC: 37 MG/DL (ref 40–75)
HDLC SERPL: 26.8 % (ref 20–50)
LDLC SERPL CALC-MCNC: 85.6 MG/DL (ref 63–159)
NONHDLC SERPL-MCNC: 101 MG/DL
TRIGL SERPL-MCNC: 77 MG/DL (ref 30–150)

## 2023-12-14 PROCEDURE — 3074F SYST BP LT 130 MM HG: CPT | Mod: CPTII,S$GLB,, | Performed by: INTERNAL MEDICINE

## 2023-12-14 PROCEDURE — 36415 COLL VENOUS BLD VENIPUNCTURE: CPT | Performed by: INTERNAL MEDICINE

## 2023-12-14 PROCEDURE — 1101F PT FALLS ASSESS-DOCD LE1/YR: CPT | Mod: CPTII,S$GLB,, | Performed by: INTERNAL MEDICINE

## 2023-12-14 PROCEDURE — 1126F AMNT PAIN NOTED NONE PRSNT: CPT | Mod: CPTII,S$GLB,, | Performed by: INTERNAL MEDICINE

## 2023-12-14 PROCEDURE — 80061 LIPID PANEL: CPT | Performed by: INTERNAL MEDICINE

## 2023-12-14 PROCEDURE — 93000 EKG 12-LEAD: ICD-10-PCS | Mod: S$GLB,,, | Performed by: INTERNAL MEDICINE

## 2023-12-14 PROCEDURE — 3288F FALL RISK ASSESSMENT DOCD: CPT | Mod: CPTII,S$GLB,, | Performed by: INTERNAL MEDICINE

## 2023-12-14 PROCEDURE — 3288F PR FALLS RISK ASSESSMENT DOCUMENTED: ICD-10-PCS | Mod: CPTII,S$GLB,, | Performed by: INTERNAL MEDICINE

## 2023-12-14 PROCEDURE — 1159F MED LIST DOCD IN RCRD: CPT | Mod: CPTII,S$GLB,, | Performed by: INTERNAL MEDICINE

## 2023-12-14 PROCEDURE — 3074F PR MOST RECENT SYSTOLIC BLOOD PRESSURE < 130 MM HG: ICD-10-PCS | Mod: CPTII,S$GLB,, | Performed by: INTERNAL MEDICINE

## 2023-12-14 PROCEDURE — 99215 OFFICE O/P EST HI 40 MIN: CPT | Mod: 25,S$GLB,, | Performed by: INTERNAL MEDICINE

## 2023-12-14 PROCEDURE — 93000 ELECTROCARDIOGRAM COMPLETE: CPT | Mod: S$GLB,,, | Performed by: INTERNAL MEDICINE

## 2023-12-14 PROCEDURE — 99215 PR OFFICE/OUTPT VISIT, EST, LEVL V, 40-54 MIN: ICD-10-PCS | Mod: 25,S$GLB,, | Performed by: INTERNAL MEDICINE

## 2023-12-14 PROCEDURE — 99999 PR PBB SHADOW E&M-EST. PATIENT-LVL V: CPT | Mod: PBBFAC,,, | Performed by: INTERNAL MEDICINE

## 2023-12-14 PROCEDURE — 1126F PR PAIN SEVERITY QUANTIFIED, NO PAIN PRESENT: ICD-10-PCS | Mod: CPTII,S$GLB,, | Performed by: INTERNAL MEDICINE

## 2023-12-14 PROCEDURE — 1101F PR PT FALLS ASSESS DOC 0-1 FALLS W/OUT INJ PAST YR: ICD-10-PCS | Mod: CPTII,S$GLB,, | Performed by: INTERNAL MEDICINE

## 2023-12-14 PROCEDURE — 1159F PR MEDICATION LIST DOCUMENTED IN MEDICAL RECORD: ICD-10-PCS | Mod: CPTII,S$GLB,, | Performed by: INTERNAL MEDICINE

## 2023-12-14 PROCEDURE — 3078F PR MOST RECENT DIASTOLIC BLOOD PRESSURE < 80 MM HG: ICD-10-PCS | Mod: CPTII,S$GLB,, | Performed by: INTERNAL MEDICINE

## 2023-12-14 PROCEDURE — 99999 PR PBB SHADOW E&M-EST. PATIENT-LVL V: ICD-10-PCS | Mod: PBBFAC,,, | Performed by: INTERNAL MEDICINE

## 2023-12-14 PROCEDURE — 3078F DIAST BP <80 MM HG: CPT | Mod: CPTII,S$GLB,, | Performed by: INTERNAL MEDICINE

## 2023-12-14 RX ORDER — IRON,CARBONYL/ASCORBIC ACID 100-250 MG
1 TABLET ORAL EVERY MORNING
COMMUNITY
Start: 2023-11-29

## 2023-12-14 NOTE — PROGRESS NOTES
Subjective:    Patient ID:  Josh Pinto is a 83 y.o. male who presents for evaluation of Follow-up (Clearance )  Came on own for establish CV care  PCP: Khanh Chang MD   Prior cardiologist: Himanshu Murphy MD, last seen 9/8/2023, wants change due to distance  Urologist: Maura Lowery Jr., MD  Lives alone, no pet  2 sisters, ANUJ and Vernon  Retired     Patient is a new patient to me.     SDOH: have medication confusion, 2 BB listed.  Health literacy: high   Vaccinations: up-to-date, completed COVID, no infection  Activities: do own house work, no yard work, exercise with PT twice weekly for an hour, no problem, no limitation.  Nicotine: 30 years 2 ppd, quit 1990  Alcohol: none  Illicit drugs: none  Cardiac symptoms: none, had PAF with severe illness   Home BP: 103 /85  Medication compliance: yes  Diet: regular, use salt  Caffeine: little  Labs:   Lab Results   Component Value Date    TSH 0.754 09/03/2023        Lab Results   Component Value Date    HGBA1C 6.2 02/09/2015       Lab Results   Component Value Date    WBC 14.37 (H) 11/30/2023    HGB 10.8 (L) 11/30/2023    HCT 34.1 (L) 11/30/2023    MCV 91 11/30/2023     11/30/2023       CMP  Sodium   Date Value Ref Range Status   11/30/2023 132 (L) 136 - 145 mmol/L Final   10/17/2023 139 136 - 145 mmol/L Final   10/16/2023 140 136 - 145 mmol/L Final     Potassium   Date Value Ref Range Status   11/30/2023 4.6 3.5 - 5.1 mmol/L Final   10/17/2023 3.2 (L) 3.5 - 5.1 mmol/L Final     Comment:     Anion Gap reference range revised on 4/28/2023   10/16/2023 3.7 3.5 - 5.1 mmol/L Final     Comment:     Anion Gap reference range revised on 4/28/2023     Chloride   Date Value Ref Range Status   11/30/2023 100 95 - 110 mmol/L Final   10/17/2023 112 (H) 95 - 110 mmol/L Final   10/16/2023 111 (H) 95 - 110 mmol/L Final     CO2   Date Value Ref Range Status   11/30/2023 18 (L) 23 - 29 mmol/L Final   10/17/2023 24 22 - 31 mmol/L Final   10/16/2023 24  22 - 31 mmol/L Final     Glucose   Date Value Ref Range Status   11/30/2023 161 (H) 70 - 110 mg/dL Final   10/17/2023 82 70 - 110 mg/dL Final     Comment:     The ADA recommends the following guidelines for fasting glucose:    Normal:       less than 100 mg/dL    Prediabetes:  100 mg/dL to 125 mg/dL    Diabetes:     126 mg/dL or higher     10/16/2023 78 70 - 110 mg/dL Final     Comment:     The ADA recommends the following guidelines for fasting glucose:    Normal:       less than 100 mg/dL    Prediabetes:  100 mg/dL to 125 mg/dL    Diabetes:     126 mg/dL or higher       BUN   Date Value Ref Range Status   11/30/2023 30 (H) 8 - 23 mg/dL Final   10/17/2023 17 9 - 21 mg/dL Final   10/16/2023 18 9 - 21 mg/dL Final     Creatinine   Date Value Ref Range Status   11/30/2023 2.2 (H) 0.5 - 1.4 mg/dL Final   10/17/2023 2.11 (H) 0.50 - 1.40 mg/dL Final   10/16/2023 2.03 (H) 0.50 - 1.40 mg/dL Final     Calcium   Date Value Ref Range Status   11/30/2023 9.1 8.7 - 10.5 mg/dL Final   10/17/2023 7.6 (L) 8.4 - 10.2 mg/dL Final   10/16/2023 7.5 (L) 8.4 - 10.2 mg/dL Final     Total Protein   Date Value Ref Range Status   11/30/2023 7.3 6.0 - 8.4 g/dL Final   10/17/2023 5.1 (L) 6.0 - 8.4 g/dL Final   10/16/2023 5.2 (L) 6.0 - 8.4 g/dL Final     Albumin   Date Value Ref Range Status   11/30/2023 2.4 (L) 3.5 - 5.2 g/dL Final   10/17/2023 2.1 (L) 3.5 - 5.2 g/dL Final   10/16/2023 2.1 (L) 3.5 - 5.2 g/dL Final     Total Bilirubin   Date Value Ref Range Status   11/30/2023 0.8 0.1 - 1.0 mg/dL Final     Comment:     For infants and newborns, interpretation of results should be based  on gestational age, weight and in agreement with clinical  observations.    Premature Infant recommended reference ranges:  Up to 24 hours.............<8.0 mg/dL  Up to 48 hours............<12.0 mg/dL  3-5 days..................<15.0 mg/dL  6-29 days.................<15.0 mg/dL     10/17/2023 0.2 0.2 - 1.3 mg/dL Final   10/16/2023 0.2 0.2 - 1.3 mg/dL Final  "    Alkaline Phosphatase   Date Value Ref Range Status   2023 250 (H) 55 - 135 U/L Final   10/17/2023 89 38 - 145 U/L Final   10/16/2023 90 38 - 145 U/L Final     AST   Date Value Ref Range Status   2023 30 10 - 40 U/L Final   10/17/2023 24 17 - 59 U/L Final   10/16/2023 26 17 - 59 U/L Final     ALT   Date Value Ref Range Status   2023 37 10 - 44 U/L Final   10/17/2023 16 0 - 50 U/L Final   10/16/2023 15 0 - 50 U/L Final     Anion Gap   Date Value Ref Range Status   2023 14 8 - 16 mmol/L Final   10/17/2023 3 (L) 5 - 12 mmol/L Final     Comment:     Anion Gap reference range revised on 2023   10/16/2023 5 5 - 12 mmol/L Final     Comment:     Anion Gap reference range revised on 2023     eGFR   Date Value Ref Range Status   2023 29.4 (A) >60 mL/min/1.73 m^2 Final   10/17/2023 31 (A) >60 mL/min/1.73 m^2 Final   10/16/2023 32 (A) >60 mL/min/1.73 m^2 Final     @labrcntip(troponini)@  No results found for: "BNP"}   Lab Results   Component Value Date    CHOL 164 2015     Lab Results   Component Value Date    HDL 37 (L) 2015     Lab Results   Component Value Date    LDLCALC 111.6 2015     Lab Results   Component Value Date    TRIG 77 2015     Lab Results   Component Value Date    CHOLHDL 22.6 2015       Last Echo: 2023  Last stress test: none  Cardiovascular angiogram: none  ECG: NSR, rate 64, late transition, QTc 445 msec  Fundoscopic exam: within the past year, negative for retinopathy    A[facility] came on own to establish CV care due to distance. Had multiple hospitalizations since 2023 due to recurrent syncope with chronic UTI post left nephrectomy for renal cell CA. Had PAF prior to surgery in 2023, complicated by severe hematuria.  No noted CV problem since home. Denies any CP nor SOB. No premature family history for CAD nor CVA. Mother  at age 96 with CHF.    Maura Lowery Jr., MD noted 2023 "We discussed that his prostate volume is 280 " "cc. Thus he is not a candidate for TURP or any minimally invasive surgeries. Not a good surgical candidate for simple prostatectomy at this time.   - Continue Finasteride 5 mg PO daily.   - Continue home health catheter exchanges every 4 weeks.   - RTC in 6 weeks for voiding trial as nurse visit in AM.   - Will need contrasted imaging for his history of RCC in 3 - 6 months."    Last ED 11/30/2023, noted "84 y/o man with CKD3, afib, HTN, BPH who presents here with complaints of syncopal episode and orthostatic hypotension. Patient reports he was recently discharged after prolonged hospitalization to SNF facility. Patient says he was doing well for a few days but today felt very weak. Patient says he was put in chair by therapy and about an hour later began to feel very weak. Patient was found unresponsive. Patient had noted orthostatic hypotension. Patient transferred to ED for further evaluation.     VS - 114/64  82  20  98 °F (36.7 °C)  (!) 92 %    CXR - Cardiomediastinal silhouette within normal limits.  Lungs clear.  No pleural effusion or pneumothorax.    He does appear to have a urinary tract infection.  His vital signs here are good, he voices no complaints and states he feels normal.  Patient has been given 1 g Rocephin here, and IV fluids.  Orthostatic vital signs were normal.  I believe he is safe for discharge home."    Himanshu Murphy MD noted 9/8/2023 - Assessment:   (Paroxysmal atrial fibrillation   Remains in sinus rhythm-sinus bradycardia.  On amiodarone and low-dose metoprolol as well as Lovenox.).      Plan:    Keep amiodarone 200 b.i.d. x1 week, then 200 mg p.o. daily thereafter.  Continue low-dose beta-blocker  Transition to either Eliquis or Xarelto if no further surgical procedures are necessary  Will follow-up in the clinic in 3-4 weeks with either Klarissa Trotter or me    Active Hospital Problems     Diagnosis     POA  ·  *SBO (small bowel obstruction) [K56.609]    ·  ACP (advance care " planning) [Z71.89]     ·  PAF (paroxysmal atrial fibrillation) [I48.0]     ·  Septic shock [A41.9, R65.21]     ·  Acute cystitis without hematuria [N30.00]     ·  HCAP (healthcare-associated pneumonia) [J18.9]    ·  Urinary retention [R33.9]     ·  SHAINA (acute kidney injury) [N17.9]     ·  Atrial fibrillation with RVR [I48.91]     ·  HTN (hypertension) [I10]     ·  Renal malignant tumor [C64.9]    Paroxysmal atrial fibrillation.  Agree with Cardizem as well as amiodarone.  Anticoagulation to be resumed once it is okay with surgery.    As long as he is hemodynamically stable, no further intervention.  If he remains in atrial fibrillation, attempt for electrical cardioversion should be made as an outpatient after being anticoagulated for 3-4 weeks    Echo Left Ventricle: The left ventricle is normal in size. Normal wall motion. There is low normal systolic function with a visually estimated ejection fraction of 50 - 55%. There is normal diastolic function.  ·  Right Ventricle: Normal right ventricular cavity size. Systolic function is normal.  ·  IVC/SVC: Normal venous pressure at 3 mmHg.        Review of Systems   Constitutional: Positive for chills and weight loss. Negative for diaphoresis, fever, malaise/fatigue and night sweats.   HENT:  Negative for nosebleeds and tinnitus.    Eyes:  Negative for visual disturbance.   Cardiovascular:  Positive for syncope (with infections). Negative for chest pain, claudication, cyanosis, dyspnea on exertion, irregular heartbeat, leg swelling, near-syncope, orthopnea, palpitations and paroxysmal nocturnal dyspnea.   Respiratory:  Negative for cough, shortness of breath, sleep disturbances due to breathing, snoring and wheezing.         [place] score 8, awaken refreshed. Sister have GISELA   Endocrine: Negative for polydipsia and polyuria.   Hematologic/Lymphatic: Does not bruise/bleed easily.   Skin:  Negative for color change, flushing, nail changes, poor wound healing and  "suspicious lesions.   Musculoskeletal:  Negative for arthritis, falls, gout, joint pain, joint swelling, muscle cramps, muscle weakness and myalgias.   Gastrointestinal:  Negative for heartburn, hematemesis, hematochezia, melena and nausea.   Genitourinary:  Positive for hematuria.   Neurological:  Positive for dizziness (with infections.). Negative for disturbances in coordination, excessive daytime sleepiness, focal weakness, headaches, light-headedness, loss of balance, numbness, vertigo and weakness.   Psychiatric/Behavioral:  Negative for depression and substance abuse. The patient does not have insomnia and is not nervous/anxious.    Allergic/Immunologic: Positive for environmental allergies.        Objective:    Physical Exam  Constitutional:       Appearance: He is well-developed.      Comments: RA O2 sat 97%  Orthostatic VS: sitting 103/69, standing 102/64   HENT:      Head: Normocephalic.   Eyes:      Conjunctiva/sclera: Conjunctivae normal.      Pupils: Pupils are equal, round, and reactive to light.   Neck:      Thyroid: No thyromegaly.      Vascular: No JVD.   Cardiovascular:      Rate and Rhythm: Normal rate and regular rhythm.      Pulses: Intact distal pulses.           Carotid pulses are 1+ on the right side and 1+ on the left side.       Radial pulses are 1+ on the right side and 1+ on the left side.        Dorsalis pedis pulses are 1+ on the right side and 1+ on the left side.        Posterior tibial pulses are 1+ on the right side and 1+ on the left side.      Heart sounds: Normal heart sounds. No murmur heard.     No friction rub. No gallop.   Pulmonary:      Effort: Pulmonary effort is normal.      Breath sounds: No rales.      Comments: Diminished breath sounds and prolong expiration.  Chest:      Chest wall: No tenderness.   Abdominal:      General: Bowel sounds are normal.      Palpations: Abdomen is soft.      Tenderness: There is no abdominal tenderness.      Comments: Waist 35" "   Musculoskeletal:         General: Normal range of motion.      Cervical back: Normal range of motion and neck supple.   Lymphadenopathy:      Cervical: No cervical adenopathy.   Skin:     General: Skin is warm and dry.      Findings: No rash.   Neurological:      Mental Status: He is alert and oriented to person, place, and time.           Assessment:       1. PAF (paroxysmal atrial fibrillation)    2. History of smoking greater than 50 pack years, quit 1990, 30 year, 2 ppd    3. Primary hypertension    4. Acute blood loss anemia    5. Hypoalbuminemia due to protein-calorie malnutrition    6. Long term current use of amiodarone    7. Nonspecific abnormal electrocardiogram (ECG) (EKG)    8. Indwelling Valdez catheter present    9. Excessive daytime sleepiness    10. Family history of sleep apnea    11. Renal cell carcinoma of left kidney, nephrectomy in 8/2023    12. At risk for cardiovascular event    13. Chronic kidney disease, stage 4 (severe)         Plan:       Josh was seen today for follow-up.    Diagnoses and all orders for this visit:    PAF (paroxysmal atrial fibrillation)  -     IN OFFICE EKG 12-LEAD (to Muse)  -     Lipid Panel; Future  -     Holter monitor - 48 hour; Future    History of smoking greater than 50 pack years, quit 1990, 30 year, 2 ppd    Primary hypertension    Acute blood loss anemia    Hypoalbuminemia due to protein-calorie malnutrition    Long term current use of amiodarone    Nonspecific abnormal electrocardiogram (ECG) (EKG)    Indwelling Valdez catheter present    Excessive daytime sleepiness    Family history of sleep apnea    Renal cell carcinoma of left kidney, nephrectomy in 8/2023    At risk for cardiovascular event  -     Lipid Panel; Future    Chronic kidney disease, stage 4 (severe)  -     Lipid Panel; Future     - All medical issues reviewed, continue current Rx. Not on OAC at triple risk for stroke with [id number] score of 3, wants to think about it. Info on Eliquis and  AF.  - Warning signs of MI and stroke given, if symptoms last more than 5 minutes, stop immediately and call 911, then chew 2-4 low-dose ASA (81 mg).   - CV status and all medications reviewed, patient acknowledge good understanding.  - Recommend healthy living: healthy diet and regular exercise aiming for fitness, restorative sleep and weight control  - Need good exercise program, 4 key elements: 1. Aerobic (walking, swimming, dancing, jogging, biking, etc, 2. Muscle strengthening / resistance exercise, need to do 2-3 times weekly, 3. Stretching daily, good stretch takes a whole  total minute. 4. Balance exercise daily.   - Encourage activities as much as tolerated. Any activity is better than none!   - Instruction for Mediterranean diet with protein supplement and heart healthy exercise given.  - Check home blood pressure, 2 days weekly, do 2 readings within 5 minutes in AM and PM, keep log for review. Target resting BP is less than 130/80.   - Highly recommend 30-60 minutes of exercise / activities daily, can have Sunday off, with 2-3 sessions of muscle strengthening weekly. A  would be very helpful.  - Recommend at least biannual cardiovascular evaluation in view of patient's significant risk factors.  - Phone review / encourage use of [facility]       Total time spend including review of record prior to face-to-face visit is 60 minutes. Greater than 50% of the time was spent in counseling and coordination of care. The above assessment and plan have been discussed at length. Referring provider's note reviewed. Labs and procedure over the last 6 months reviewed. Problem List updated. Asked to bring in all active medications / pills bottles with next visit. Will send note to referring / PCP.

## 2023-12-14 NOTE — PATIENT INSTRUCTIONS
Recommended Mediterranean dietEating Heart-Healthy Food: Using the DASH Plan  Eating for your heart doesnt have to be hard or boring. You just need to know how to make healthier choices. The DASH eating plan has been developed to help you do just that. DASH stands for Dietary Approaches to Stop Hypertension. It is a plan that has been proven to be healthier for your heart and to lower your risk for high blood pressure. It can also help lower your risk for cancer, heart disease, osteoporosis, and diabetes.  Choosing from Each Food Group  Choose foods from each of the food groups below each day. Try to get the recommended number of servings for each food group. The serving numbers are based on a diet of 2,000 calories a day. Talk to your doctor if youre unsure about your calorie needs.  Grains   Servings: 7-8 a day  A serving is:  1 slice bread  1 ounce dry cereal  half a cup cooked rice or pasta  Best choices: Whole grains and any grains high in fiber.  Vegetables   Servings: 4-5 a day  A serving is:  1 cup raw leafy vegetable  Half a cup cooked vegetable  Three-quarter cup vegetable juice  Best choices: Fresh or frozen vegetable prepared without too much added salt or fat.    Fruits   Servings: 4-5 a day  A serving is:  Three-quarter cup fruit juice  1 medium fruit  One-quarter cup dried fruit  One-half cup fresh, frozen, or canned fruit  Best choices: A variety of fresh fruits of different colors. Whole fruits are a much better choice than fruit juices.  Low-fat or Fat Free Dairy   Servings: 2-3 a day  A serving is:  8 ounces milk  1 cup yogurt  One and a half ounces cheese  Best choices: Skim or 1% milk, low-fat or fat free yogurt or buttermilk, and low-fat cheeses.       Meat, Poultry, Fish   Servings: 2 or fewer a day  A serving is:  3 ounces cooked meat, poultry, or fish  Best choices: Lean meats and fish. Trim away visible fat. Broil, roast, or boil instead of frying. Remove skin from poultry before eating.   Nuts, Seeds, Beans   Servings: 4-5 a week  A serving is:  One third cup nuts (or one and a half ounces)  2 tablespoons sunflower seeds  Half a cup cooked beans  Best choices: Dry roasted nuts with no salt added, lentils, kidney beans, garbanzo beans, and whole walton beans.    Fats and Oils   Servings: 2 a day  A serving is:  1 teaspoon vegetable oil  1 teaspoon soft margarine  1 tablespoon low-fat mayonnaise  1 teaspoon regular mayonnaise  2 tablespoons light salad dressing  1 tablespoon regular salad dressing  Best choices: Monounsaturated and polyunsaturated fats such as olive, canola, or safflower oil.  Sweets   Servings: 5 a week or fewer  A serving is:  1 tablespoon sugar, maple syrup, or honey  1 tablespoon jam or jelly  1 half-ounce jelly beans (about 15)  8 ounces lemonade  Best choices: Dried fruit can be a satisfying sweet. Choose low-fat sweets when possible. And watch your serving sizes!       Aerobic Exercise for a Healthy Heart  Exercise is a lot more than an energy booster and a stress reliever. It also strengthens your heart muscle, lowers your blood pressure and blood cholesterol, and burns calories.      Remember, some activity is better than none.     Choose an Aerobic Activity  Choose a nonstop activity that makes your heart and lungs work harder than they do when you rest or walk normally. This aerobic exercise can improve the way your heart and other muscles use oxygen. Make it fun by exercising with a friend and choosing an activity you enjoy. Here are some ideas:  Walking  Swimming  Bicycling  Stair climbing  Dancing  Jogging  Exercise Regularly  If you havent been exercising regularly,  get your doctors okay first. Then start slowly.  Here are some tips:  Begin exercising 3 times a week for 5-10 minutes at a time.  When you feel comfortable, add a few minutes each week.  Slowly build up to exercising 3-4 times each week for 20-40 minutes. Aim for a total of 150 or more minutes a  week.  Be sure to carry your nitroglycerin with you when you exercise.  If you get angina when youre exercising, stop what youre doing, take your nitroglycerin, and call your doctor.  © 2614-5822 [employer], [address] [place] [address], [place] [address]. All rights reserved. This information is not intended as a substitute for professional medical care. Always follow your healthcare professional's instructions.

## 2023-12-15 ENCOUNTER — OFFICE VISIT (OUTPATIENT)
Dept: UROLOGY | Facility: CLINIC | Age: 83
End: 2023-12-15
Payer: MEDICARE

## 2023-12-15 VITALS — HEIGHT: 69 IN | WEIGHT: 141 LBS | BODY MASS INDEX: 20.88 KG/M2

## 2023-12-15 DIAGNOSIS — R33.9 URINARY RETENTION: Primary | ICD-10-CM

## 2023-12-15 PROCEDURE — 99214 PR OFFICE/OUTPT VISIT, EST, LEVL IV, 30-39 MIN: ICD-10-PCS | Mod: 25,S$GLB,, | Performed by: UROLOGY

## 2023-12-15 PROCEDURE — 3288F FALL RISK ASSESSMENT DOCD: CPT | Mod: CPTII,S$GLB,, | Performed by: UROLOGY

## 2023-12-15 PROCEDURE — 1160F PR REVIEW ALL MEDS BY PRESCRIBER/CLIN PHARMACIST DOCUMENTED: ICD-10-PCS | Mod: CPTII,S$GLB,, | Performed by: UROLOGY

## 2023-12-15 PROCEDURE — 51702 PR INSERTION OF TEMPORARY INDWELLING BLADDER CATHETER, SIMPLE: ICD-10-PCS | Mod: S$GLB,,, | Performed by: UROLOGY

## 2023-12-15 PROCEDURE — 3288F PR FALLS RISK ASSESSMENT DOCUMENTED: ICD-10-PCS | Mod: CPTII,S$GLB,, | Performed by: UROLOGY

## 2023-12-15 PROCEDURE — 99999 PR PBB SHADOW E&M-EST. PATIENT-LVL IV: CPT | Mod: PBBFAC,,, | Performed by: UROLOGY

## 2023-12-15 PROCEDURE — 1126F PR PAIN SEVERITY QUANTIFIED, NO PAIN PRESENT: ICD-10-PCS | Mod: CPTII,S$GLB,, | Performed by: UROLOGY

## 2023-12-15 PROCEDURE — 51702 INSERT TEMP BLADDER CATH: CPT | Mod: S$GLB,,, | Performed by: UROLOGY

## 2023-12-15 PROCEDURE — 1159F MED LIST DOCD IN RCRD: CPT | Mod: CPTII,S$GLB,, | Performed by: UROLOGY

## 2023-12-15 PROCEDURE — 1160F RVW MEDS BY RX/DR IN RCRD: CPT | Mod: CPTII,S$GLB,, | Performed by: UROLOGY

## 2023-12-15 PROCEDURE — 1159F PR MEDICATION LIST DOCUMENTED IN MEDICAL RECORD: ICD-10-PCS | Mod: CPTII,S$GLB,, | Performed by: UROLOGY

## 2023-12-15 PROCEDURE — 99999 PR PBB SHADOW E&M-EST. PATIENT-LVL IV: ICD-10-PCS | Mod: PBBFAC,,, | Performed by: UROLOGY

## 2023-12-15 PROCEDURE — 1126F AMNT PAIN NOTED NONE PRSNT: CPT | Mod: CPTII,S$GLB,, | Performed by: UROLOGY

## 2023-12-15 PROCEDURE — 1101F PR PT FALLS ASSESS DOC 0-1 FALLS W/OUT INJ PAST YR: ICD-10-PCS | Mod: CPTII,S$GLB,, | Performed by: UROLOGY

## 2023-12-15 PROCEDURE — 1101F PT FALLS ASSESS-DOCD LE1/YR: CPT | Mod: CPTII,S$GLB,, | Performed by: UROLOGY

## 2023-12-15 PROCEDURE — 99214 OFFICE O/P EST MOD 30 MIN: CPT | Mod: 25,S$GLB,, | Performed by: UROLOGY

## 2023-12-15 RX ORDER — OXYBUTYNIN CHLORIDE 10 MG/1
10 TABLET, EXTENDED RELEASE ORAL DAILY
Qty: 21 TABLET | Refills: 0 | Status: SHIPPED | OUTPATIENT
Start: 2023-12-15 | End: 2024-01-17

## 2023-12-15 NOTE — PROGRESS NOTES
[facility] Established Patient/H&P:    Josh Pinto is a 83 y.o. male who presents for follow up for urinary retention.     Patient with a several year history of left renal RCC and recurrent urinary retention who presents to [facility] care.      He was previously managed by Dr. Escobar and Dr. Garcia. On review of records, he underwent robotic radical left nephrectomy on 8/20/23 with Dr. Garcia. 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       He reports having a catheter placed prior to his surgery that has remained in place. He was offered TURP, but states  he no longer wants to follow up with Dr. Garcia.      Patient also required a catheter in 2015 that was managed by Dr. Escobar. He was on Cardura 4 mg. Also has a history of elevated PSA. He is now only managed with Finasteride 5 mg PO daily. He has a prescription for Flomax, but has stopped due to hypotension.      He underwent cystoscopy and bladder fulguration on 8/19/23 with Dr. Garcia. 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 Prostate volume 280 cc on US bladder on 9/25/23. CT abdomen pelvis without contrast on 9/17/23 with no recurrence of his renal cell carcinoma.        Interval History    12/15/23:Plan at initial visit was to continue Finasteride 5 mg and return for possible voiding trial on 1/27/23 with follow up with me in 3 months. He states that he has had issues with leaking around his catheter after each home health exchange. Referred here for evaluation. He has a 20 Kazakh in place. No recent infections.      Denies any fever, chills, gross hematuria, flank pain, bone pain, unintentional weight loss,  trauma or history of  malignancy.         IPSS    QoL  34        5          11/17/23     PSA  3.5                   8/15/23    Past Medical History:  "  Diagnosis Date    Allergy     Arthritis     Cataract     Hypertension        Past Surgical History:   Procedure Laterality Date    BLADDER FULGURATION N/A 8/19/2023    Procedure: FULGURATION, BLADDER;  Surgeon: Abdulaziz Garcia MD;  Location: [facility] OR;  Service: Urology;  Laterality: N/A;    CYSTOSCOPY N/A 8/19/2023    Procedure: CYSTOSCOPY;  Surgeon: Abdulaziz Garcia MD;  Location: [facility] OR;  Service: Urology;  Laterality: N/A;    ESOPHAGOGASTRODUODENOSCOPY N/A 9/20/2023    Procedure: EGD (ESOPHAGOGASTRODUODENOSCOPY);  Surgeon: Varinder Melo MD;  Location: [facility] ENDO;  Service: Gastroenterology;  Laterality: N/A;    HERNIA REPAIR N/A 9/3/2023    Procedure: REPAIR, HERNIA INTERNAL;  Surgeon: Andrew Juares MD;  Location: [facility] OR;  Service: General;  Laterality: N/A;    INSERTION OF CATHETER N/A 8/19/2023    Procedure: INSERTION, CATHETER;  Surgeon: Abdulaziz Garcia MD;  Location: [facility] OR;  Service: Urology;  Laterality: N/A;    knee surgery      right knee    LAPAROSCOPIC ROBOT-ASSISTED SURGICAL REMOVAL OF KIDNEY USING DA EVIN XI Left 8/20/2023    Procedure: XI ROBOTIC NEPHRECTOMY;  Surgeon: Abdulaziz Garcia MD;  Location: [facility] OR;  Service: Urology;  Laterality: Left;    LAPAROTOMY, EXPLORATORY N/A 9/3/2023    Procedure: LAPAROTOMY, EXPLORATORY;  Surgeon: Andrew Juares MD;  Location: [facility] OR;  Service: General;  Laterality: N/A;       Review of patient's allergies indicates:   Allergen Reactions    Sulfa (sulfonamide antibiotics) Rash       Medications Reviewed: see MAR    FOCUSED PHYSICAL EXAM:    There were no vitals filed for this visit.  Body mass index is 20.82 kg/m². Weight: 64 kg (141 lb) Height: 5' 9" (175.3 cm)       General: Alert, cooperative, no distress, appears stated age  Abdomen: Soft, non-tender, no CVA tenderness, non-distended  : 20 Swedish in place with a 10 cc balloon.     LABS:    Recent Results (from the past 336 hour(s))   Lipid Panel    Collection Time: 12/14/23 " 12:13 PM   Result Value Ref Range    Cholesterol 138 120 - 199 mg/dL    Triglycerides 77 30 - 150 mg/dL    HDL 37 (L) 40 - 75 mg/dL    LDL Cholesterol 85.6 63.0 - 159.0 mg/dL    HDL/Cholesterol Ratio 26.8 20.0 - 50.0 %    Total Cholesterol/HDL Ratio 3.7 2.0 - 5.0    Non-HDL Cholesterol 101 mg/dL           Assessment/Diagnosis:    1. Urinary retention            Plans:    - I spent 30 minutes of the day of this encounter preparing for, treating and managing this patient. We discussed that his prostate volume is 280 cc. Thus he is not a candidate for TURP or any minimally invasive surgeries. Not a good surgical candidate for simple prostatectomy at this time.   - Continue Finasteride 5 mg PO daily.   - 20 Kyrgyz purdy exchanged with a 20 Kyrgyz 30 cc balloon to assist with leakage. Irrigated with 50 cc sterile water.   - RTC in 4 weeks for nurse visit CIC teaching 3 times daily with a 16 Kyrgyz coude.   - RX for oxybutynin 10 mg for 3 weeks for bladder spasms.   - RTC in 3 months with me with symptom score and PVR.

## 2023-12-15 NOTE — PROGRESS NOTES
Patient here today for catheter change.   2 patient identifiers verified  10 mL sterile water removed from catheter balloon.   50 ml of sterile water placed in bladder  Catheter removed.   Catheter bag contains 10ml of yellow urine  ?  Patient draped and prepped in sterile fashion.?  20 FR Coude catheter placed into the bladder with no difficulty.   Balloon filled with 30 ml sterile water  Catheter attached to leg bag.   Leg bag secured to right with stat-Lock.  ?  Patient left the office in satisfactory condition

## 2024-01-12 ENCOUNTER — CLINICAL SUPPORT (OUTPATIENT)
Dept: UROLOGY | Facility: CLINIC | Age: 84
End: 2024-01-12
Payer: MEDICARE

## 2024-01-12 DIAGNOSIS — R33.9 URINARY RETENTION: Primary | ICD-10-CM

## 2024-01-12 PROCEDURE — 51702 INSERT TEMP BLADDER CATH: CPT | Mod: S$GLB,,, | Performed by: UROLOGY

## 2024-01-12 PROCEDURE — 99499 UNLISTED E&M SERVICE: CPT | Mod: S$GLB,,, | Performed by: UROLOGY

## 2024-01-12 NOTE — PROGRESS NOTES
Pt here to learn CIC    Taught by: Zachery Herring    Reason: urinary retention/ BPH  Catheter used: 16 fr coude,   Catheter went: with difficulty  Drained amount: 0 cc  Urine sent for: NONE  Follow up date: 2/12/2024 FOR NEXT PURDY CHANGE   prescription sent yet: No  Box of catheters provided: 0    Patient was not able to demonstrate CIC themselves. Patient was prepped and instilled with 50 cc of sterile water. Deflated 30 cc out of balloon and removed 20 Coude catheter. Once catheter was removed patient was instructed how to insert a coude catheter. Patient attempted to insert catheter. Catheter did have some resistance once trying to go through prostate. Catheter was then removed. Once removed blood was dripping from tip of penis with one large blood clot in patients brief. Informed . Per him to place purdy back in.     Patient draped and prepped in sterile fashion.?  18 FR Coude catheter placed into the bladder with no difficulty.   Balloon filled with 10 ml sterile water  Catheter attached to leg bag with light pink urine.    Leg bag secured to right with stat-Lock.  ?  Patient left the office in satisfactory condition and next purdy change scheduled.

## 2024-01-17 RX ORDER — OXYBUTYNIN CHLORIDE 10 MG/1
10 TABLET, EXTENDED RELEASE ORAL DAILY
Qty: 90 TABLET | Refills: 3 | Status: SHIPPED | OUTPATIENT
Start: 2024-01-17 | End: 2024-06-19

## 2024-01-22 ENCOUNTER — HOSPITAL ENCOUNTER (EMERGENCY)
Facility: HOSPITAL | Age: 84
Discharge: HOME | End: 2024-01-22
Attending: STUDENT IN AN ORGANIZED HEALTH CARE EDUCATION/TRAINING PROGRAM
Payer: MEDICARE

## 2024-01-22 VITALS
OXYGEN SATURATION: 94 % | BODY MASS INDEX: 21.18 KG/M2 | RESPIRATION RATE: 18 BRPM | SYSTOLIC BLOOD PRESSURE: 119 MMHG | DIASTOLIC BLOOD PRESSURE: 72 MMHG | WEIGHT: 143 LBS | HEART RATE: 60 BPM | HEIGHT: 69 IN | TEMPERATURE: 96 F

## 2024-01-22 DIAGNOSIS — R33.9 URINARY RETENTION: Primary | ICD-10-CM

## 2024-01-22 DIAGNOSIS — N30.01 ACUTE CYSTITIS WITH HEMATURIA: ICD-10-CM

## 2024-01-22 LAB
BACTERIA #/AREA URNS HPF: ABNORMAL /HPF
BILIRUB UR QL STRIP: NEGATIVE
CLARITY UR: ABNORMAL
COLOR UR: YELLOW
GLUCOSE UR QL STRIP: NEGATIVE
HGB UR QL STRIP: ABNORMAL
HYALINE CASTS #/AREA URNS LPF: 0 /LPF
KETONES UR QL STRIP: NEGATIVE
LEUKOCYTE ESTERASE UR QL STRIP: ABNORMAL
MICROSCOPIC COMMENT: ABNORMAL
NITRITE UR QL STRIP: NEGATIVE
PH UR STRIP: 8 [PH] (ref 5–8)
PROT UR QL STRIP: ABNORMAL
RBC #/AREA URNS HPF: >100 /HPF (ref 0–4)
SP GR UR STRIP: 1.01 (ref 1–1.03)
URN SPEC COLLECT METH UR: ABNORMAL
UROBILINOGEN UR STRIP-ACNC: NEGATIVE EU/DL
WBC #/AREA URNS HPF: 25 /HPF (ref 0–5)
WBC CLUMPS URNS QL MICRO: ABNORMAL

## 2024-01-22 PROCEDURE — 51702 INSERT TEMP BLADDER CATH: CPT

## 2024-01-22 PROCEDURE — 99283 EMERGENCY DEPT VISIT LOW MDM: CPT

## 2024-01-22 PROCEDURE — 87086 URINE CULTURE/COLONY COUNT: CPT | Performed by: NURSE PRACTITIONER

## 2024-01-22 PROCEDURE — 81000 URINALYSIS NONAUTO W/SCOPE: CPT | Performed by: NURSE PRACTITIONER

## 2024-01-22 RX ORDER — CEPHALEXIN 500 MG/1
500 CAPSULE ORAL EVERY 12 HOURS
Qty: 14 CAPSULE | Refills: 0 | Status: SHIPPED | OUTPATIENT
Start: 2024-01-22 | End: 2024-01-29

## 2024-01-22 NOTE — ED PROVIDER NOTES
Encounter Date: 1/22/2024       History     Chief Complaint   Patient presents with    Urinary Retention     Pt report purdy in place stopped draining this morning     83-year-old male with a history of hypertension, BPH, with indwelling Purdy catheter.  He presents to ED with complaints of clogged Purdy catheter and leakage around Purdy since waking up this morning. He denies associated pelvic pain, nausea, fever, chills, essentially negative review of systems.    The history is provided by the patient. No  was used.     Review of patient's allergies indicates:   Allergen Reactions    Sulfa (sulfonamide antibiotics) Rash     Past Medical History:   Diagnosis Date    Allergy     Arthritis     Cataract     Hypertension      Past Surgical History:   Procedure Laterality Date    BLADDER FULGURATION N/A 8/19/2023    Procedure: FULGURATION, BLADDER;  Surgeon: Abdulaziz Garcia MD;  Location: [facility];  Service: Urology;  Laterality: N/A;    CYSTOSCOPY N/A 8/19/2023    Procedure: CYSTOSCOPY;  Surgeon: Abdulaziz Garcia MD;  Location: [facility];  Service: Urology;  Laterality: N/A;    ESOPHAGOGASTRODUODENOSCOPY N/A 9/20/2023    Procedure: EGD (ESOPHAGOGASTRODUODENOSCOPY);  Surgeon: Varinder Melo MD;  Location: [facility];  Service: Gastroenterology;  Laterality: N/A;    HERNIA REPAIR N/A 9/3/2023    Procedure: REPAIR, HERNIA INTERNAL;  Surgeon: Andrew Juares MD;  Location: [facility];  Service: General;  Laterality: N/A;    INSERTION OF CATHETER N/A 8/19/2023    Procedure: INSERTION, CATHETER;  Surgeon: Abdulaziz Garcia MD;  [facility];  Service: Urology;  Laterality: N/A;    knee surgery      right knee    LAPAROSCOPIC ROBOT-ASSISTED SURGICAL REMOVAL OF KIDNEY USING DA EVIN XI Left 8/20/2023    Procedure: XI ROBOTIC NEPHRECTOMY;  Surgeon: Abdulaziz Garcia MD;  Location: [facility];  Service: Urology;  Laterality: Left;    LAPAROTOMY, EXPLORATORY N/A 9/3/2023    Procedure: LAPAROTOMY,  EXPLORATORY;  Surgeon: Andrew Juares MD;  Location: [facility] OR;  Service: General;  Laterality: N/A;     Family History   Problem Relation Age of Onset    Heart disease Mother     Cancer Paternal Grandmother         stroke    Cancer Maternal Grandmother         lung cancer    Cancer Maternal Grandfather         brain cancer     Social History     Tobacco Use    Smoking status: Former    Smokeless tobacco: Never   Substance Use Topics    Alcohol use: No    Drug use: No     Review of Systems   Constitutional: Negative.    HENT: Negative.     Eyes: Negative.    Respiratory: Negative.     Cardiovascular: Negative.    Gastrointestinal: Negative.    Endocrine: Negative.    Genitourinary: Negative.         Clogged Valdez catheter   Musculoskeletal: Negative.    Skin: Negative.    Neurological: Negative.    Hematological: Negative.    Psychiatric/Behavioral: Negative.     All other systems reviewed and are negative.      Physical Exam     Initial Vitals [01/22/24 1505]   BP Pulse Resp Temp SpO2   106/70 66 18 96 °F (35.6 °C) 98 %      MAP       --         Physical Exam    Nursing note and vitals reviewed.  Constitutional: He appears well-developed.   HENT:   Head: Normocephalic.   Eyes: Pupils are equal, round, and reactive to light.   Neck:   Normal range of motion.  Cardiovascular:  Normal rate.           Pulmonary/Chest: Breath sounds normal.   Abdominal: Abdomen is soft.   Genitourinary:    Genitourinary Comments: Clogged indwelling Valdez catheter     Musculoskeletal:      Cervical back: Normal range of motion.     Neurological: He is alert. GCS score is 15. GCS eye subscore is 4. GCS verbal subscore is 5. GCS motor subscore is 6.   Skin: Skin is warm and dry. Capillary refill takes less than 2 seconds.         ED Course   Procedures  Labs Reviewed   URINALYSIS, REFLEX TO URINE CULTURE - Abnormal; Notable for the following components:       Result Value    Appearance, UA Hazy (*)     Protein, UA 1+ (*)     Occult Blood  UA 3+ (*)     Leukocytes, UA 1+ (*)     All other components within normal limits    Narrative:     Preferred Collection Type->Urine, Clean Catch  Specimen Source->Urine   URINALYSIS MICROSCOPIC - Abnormal; Notable for the following components:    RBC, UA >100 (*)     WBC, UA 25 (*)     WBC Clumps, UA Few (*)     Bacteria Few (*)     All other components within normal limits    Narrative:     Preferred Collection Type->Urine, Clean Catch  Specimen Source->Urine   CULTURE, URINE          Imaging Results    None          Medications - No data to display  Medical Decision Making  83-year-old male with clogged indwelling Valdez catheter.  Valdez catheter was irrigated multiple times without much success in de-clogging, was subsequently removed and a new coude 18 French Valdez catheter placed without any complications.  UA, shows evidence of UTI.  Rx Keflex.  Patient was seen and reevaluated. Patient's symptoms seem to be stable. I discussed the patient's diagnosis, treatment plan, and plan for discharge with the patient. Patient was instructed to follow up with PCP and was given strict return precautions to the ED. The patient voiced understanding and agreed with the plan      Amount and/or Complexity of Data Reviewed  External Data Reviewed: labs.    Risk  Prescription drug management.                                      Clinical Impression:  Final diagnoses:  [R33.9] Urinary retention (Primary)  [N30.01] Acute cystitis with hematuria          ED Disposition Condition    Discharge Stable          ED Prescriptions       Medication Sig Dispense Start Date End Date Auth. Provider    cephALEXin (KEFLEX) 500 MG capsule Take 1 capsule (500 mg total) by mouth every 12 (twelve) hours. for 7 days 14 capsule 1/22/2024 1/29/2024 Malachi Ang MD          Follow-up Information       Follow up With Specialties Details Why Contact Info    Khanh Chang MD Family Medicine  As needed [address]  [facility] [place]  [id number]  [phone]               Malachi Ang MD  01/22/24 1800

## 2024-01-23 LAB
BACTERIA UR CULT: NORMAL
BACTERIA UR CULT: NORMAL

## 2024-02-12 ENCOUNTER — CLINICAL SUPPORT (OUTPATIENT)
Dept: UROLOGY | Facility: CLINIC | Age: 84
End: 2024-02-12
Payer: MEDICARE

## 2024-02-12 DIAGNOSIS — R33.9 URINARY RETENTION: Primary | ICD-10-CM

## 2024-02-12 PROCEDURE — 99499 UNLISTED E&M SERVICE: CPT | Mod: S$GLB,,, | Performed by: UROLOGY

## 2024-02-12 PROCEDURE — 51701 INSERT BLADDER CATHETER: CPT | Mod: S$GLB,,, | Performed by: UROLOGY

## 2024-02-12 NOTE — PROGRESS NOTES
Patient here today to have his catheter removed.   ?  10 ml saline removed from catheter balloon.    18 coude  Catheter removed .   Catheter bag contains 25 MLs of clear yellow urine   Pt wanted to try CIC training again and was successful. Orders placed for pt to get catheters.   Strict instructions to call us (if during clinic hours) or go to ED (if after hours) if can't void for 6 hrs.  Patient left the office in satisfactory condition. Pt verbalized understanding.

## 2024-02-13 ENCOUNTER — HOSPITAL ENCOUNTER (EMERGENCY)
Facility: HOSPITAL | Age: 84
Discharge: HOME | End: 2024-02-13
Attending: STUDENT IN AN ORGANIZED HEALTH CARE EDUCATION/TRAINING PROGRAM
Payer: MEDICARE

## 2024-02-13 ENCOUNTER — PATIENT MESSAGE (OUTPATIENT)
Dept: UROLOGY | Facility: CLINIC | Age: 84
End: 2024-02-13
Payer: MEDICARE

## 2024-02-13 VITALS
HEART RATE: 53 BPM | TEMPERATURE: 98 F | RESPIRATION RATE: 18 BRPM | BODY MASS INDEX: 21.03 KG/M2 | OXYGEN SATURATION: 92 % | DIASTOLIC BLOOD PRESSURE: 76 MMHG | HEIGHT: 69 IN | SYSTOLIC BLOOD PRESSURE: 124 MMHG | WEIGHT: 142 LBS

## 2024-02-13 DIAGNOSIS — R33.8 ACUTE URINARY RETENTION: Primary | ICD-10-CM

## 2024-02-13 LAB
BACTERIA #/AREA URNS HPF: ABNORMAL /HPF
BILIRUB UR QL STRIP: ABNORMAL
CLARITY UR: ABNORMAL
COLOR UR: ABNORMAL
GLUCOSE UR QL STRIP: ABNORMAL
HGB UR QL STRIP: ABNORMAL
KETONES UR QL STRIP: ABNORMAL
LEUKOCYTE ESTERASE UR QL STRIP: ABNORMAL
MICROSCOPIC COMMENT: ABNORMAL
NITRITE UR QL STRIP: ABNORMAL
PH UR STRIP: ABNORMAL [PH] (ref 5–8)
PROT UR QL STRIP: ABNORMAL
RBC #/AREA URNS HPF: >100 /HPF (ref 0–4)
SP GR UR STRIP: ABNORMAL (ref 1–1.03)
URN SPEC COLLECT METH UR: ABNORMAL
UROBILINOGEN UR STRIP-ACNC: ABNORMAL EU/DL
WBC #/AREA URNS HPF: 3 /HPF (ref 0–5)

## 2024-02-13 PROCEDURE — 51702 INSERT TEMP BLADDER CATH: CPT

## 2024-02-13 PROCEDURE — 99283 EMERGENCY DEPT VISIT LOW MDM: CPT | Mod: 25

## 2024-02-13 PROCEDURE — 81000 URINALYSIS NONAUTO W/SCOPE: CPT | Performed by: STUDENT IN AN ORGANIZED HEALTH CARE EDUCATION/TRAINING PROGRAM

## 2024-02-13 NOTE — ED PROVIDER NOTES
Encounter Date: 2/13/2024       History     Chief Complaint   Patient presents with    Urinary Retention       83-year-old male with a history of hypertension, BPH, with indwelling Valdez catheter for six-month, and which was removed by urology yesterday. He presents to ED with complaints of urinary retention and inability to pass in and out catheter this am. He reports he was able to self catheterize at 2:00 a.m. and since then has not been able to so.  He reports associated hematuria, pelvic discomfort out otherwise denies nausea, fever, chills, essentially negative review of systems. He is currently taking Flomax as well as finasteride.    The history is provided by the patient. No  was used.     Review of patient's allergies indicates:   Allergen Reactions    Sulfa (sulfonamide antibiotics) Rash     Past Medical History:   Diagnosis Date    Allergy     Arthritis     Cataract     Hypertension      Past Surgical History:   Procedure Laterality Date    BLADDER FULGURATION N/A 8/19/2023    Procedure: FULGURATION, BLADDER;  Surgeon: Abdulaziz Garcia MD;  Location: [facility] OR;  Service: Urology;  Laterality: N/A;    CYSTOSCOPY N/A 8/19/2023    Procedure: CYSTOSCOPY;  Surgeon: Abdulaziz Garcia MD;  Location: [facility] OR;  Service: Urology;  Laterality: N/A;    ESOPHAGOGASTRODUODENOSCOPY N/A 9/20/2023    Procedure: EGD (ESOPHAGOGASTRODUODENOSCOPY);  Surgeon: Varinder Melo MD;  Location: [facility] ENDO;  Service: Gastroenterology;  Laterality: N/A;    HERNIA REPAIR N/A 9/3/2023    Procedure: REPAIR, HERNIA INTERNAL;  Surgeon: Andrew Juares MD;  Location: [facility] OR;  Service: General;  Laterality: N/A;    INSERTION OF CATHETER N/A 8/19/2023    Procedure: INSERTION, CATHETER;  Surgeon: Abdulaziz Garcia MD;  Location: [facility] OR;  Service: Urology;  Laterality: N/A;    knee surgery      right knee    LAPAROSCOPIC ROBOT-ASSISTED SURGICAL REMOVAL OF KIDNEY USING DA EVIN XI Left 8/20/2023    Procedure:  XI ROBOTIC NEPHRECTOMY;  Surgeon: Abdulaziz Garcia MD;  Location: [facility] OR;  Service: Urology;  Laterality: Left;    LAPAROTOMY, EXPLORATORY N/A 9/3/2023    Procedure: LAPAROTOMY, EXPLORATORY;  Surgeon: Andrew Juares MD;  Location: [facility] OR;  Service: General;  Laterality: N/A;     Family History   Problem Relation Age of Onset    Heart disease Mother     Cancer Paternal Grandmother         stroke    Cancer Maternal Grandmother         lung cancer    Cancer Maternal Grandfather         brain cancer     Social History     Tobacco Use    Smoking status: Former    Smokeless tobacco: Never   Substance Use Topics    Alcohol use: No    Drug use: No     Review of Systems   Constitutional: Negative.    HENT: Negative.     Eyes: Negative.    Respiratory: Negative.     Cardiovascular: Negative.    Gastrointestinal: Negative.    Genitourinary:  Positive for decreased urine volume and difficulty urinating.   Neurological: Negative.    Hematological: Negative.    Psychiatric/Behavioral: Negative.     All other systems reviewed and are negative.      Physical Exam     Initial Vitals [02/13/24 1112]   BP Pulse Resp Temp SpO2   124/76 (!) 53 18 97.6 °F (36.4 °C) (!) 92 %      MAP       --         Physical Exam    Nursing note and vitals reviewed.  Constitutional: He appears well-developed.   HENT:   Head: Normocephalic.   Eyes: Pupils are equal, round, and reactive to light.   Neck:   Normal range of motion.  Pulmonary/Chest: Breath sounds normal.   Abdominal: Abdomen is soft.   Suprapubic tenderness   Musculoskeletal:         General: Normal range of motion.      Cervical back: Normal range of motion.     Neurological: He is alert and oriented to person, place, and time. GCS score is 15. GCS eye subscore is 4. GCS verbal subscore is 5. GCS motor subscore is 6.   Skin: Capillary refill takes less than 2 seconds.   Psychiatric: He has a normal mood and affect.         ED Course   Procedures  Labs Reviewed   URINALYSIS, REFLEX  TO URINE CULTURE - Abnormal; Notable for the following components:       Result Value    Color, UA Red (*)     Appearance, UA Cloudy (*)     All other components within normal limits    Narrative:     Preferred Collection Type->Urine, Clean Catch  Specimen Source->Urine   URINALYSIS MICROSCOPIC - Abnormal; Notable for the following components:    RBC, UA >100 (*)     All other components within normal limits    Narrative:     Preferred Collection Type->Urine, Clean Catch  Specimen Source->Urine          Imaging Results    None          Medications - No data to display  Medical Decision Making  83-year-old male with urinary retention.  Differential includes BPH sequela, UTI, mass, others  22 gauge coude catheter placed and yielded 600 cc of blood-tinged urine.  There was not much resistance or difficulty placing the coude catheter.  Patient was seen and reevaluated. Patient's symptoms seem to be stable. I discussed the patient's diagnosis, treatment plan, and plan for discharge with the patient. Patient was instructed to follow up with urology and was given strict return precautions to the ED. The patient voiced understanding and agreed with the plan        Amount and/or Complexity of Data Reviewed  External Data Reviewed: labs.                                      Clinical Impression:  Final diagnoses:  [R33.8] Acute urinary retention (Primary)          ED Disposition Condition    Discharge Stable          ED Prescriptions    None       Follow-up Information       Follow up With Specialties Details Why Contact Info    Khanh Chang MD Family Medicine  As needed [address]  [facility] [id number]  [phone]               Malachi Ang MD  02/13/24 1254

## 2024-02-13 NOTE — ED TRIAGE NOTES
Patient presents to ED POV with c/o urinary retention. He reports that he has had a purdy for 6 months but had it removed by his urologist yesterday in [place]. His urologist wants him to do intermittent in and out catheterizations. He reports that he had successful urine return last night but has been unable to get around his prostate this morning. He is AAOx4. Skin warm, dry to touch. Respirations even, non labored. Ambulatory unassisted with steady gait into triage.

## 2024-02-17 ENCOUNTER — HOSPITAL ENCOUNTER (EMERGENCY)
Facility: HOSPITAL | Age: 84
Discharge: HOME | End: 2024-02-17
Payer: MEDICARE

## 2024-02-17 VITALS
HEIGHT: 69 IN | SYSTOLIC BLOOD PRESSURE: 95 MMHG | DIASTOLIC BLOOD PRESSURE: 71 MMHG | OXYGEN SATURATION: 95 % | WEIGHT: 142 LBS | RESPIRATION RATE: 16 BRPM | HEART RATE: 61 BPM | BODY MASS INDEX: 21.03 KG/M2 | TEMPERATURE: 98 F

## 2024-02-17 DIAGNOSIS — N30.90 CYSTITIS: ICD-10-CM

## 2024-02-17 DIAGNOSIS — T83.011A MALFUNCTION OF FOLEY CATHETER, INITIAL ENCOUNTER: Primary | ICD-10-CM

## 2024-02-17 LAB
BACTERIA #/AREA URNS HPF: ABNORMAL /HPF
BILIRUB UR QL STRIP: NEGATIVE
CLARITY UR: CLEAR
COLOR UR: YELLOW
GLUCOSE UR QL STRIP: NEGATIVE
HGB UR QL STRIP: ABNORMAL
HYALINE CASTS #/AREA URNS LPF: 0 /LPF
KETONES UR QL STRIP: NEGATIVE
LEUKOCYTE ESTERASE UR QL STRIP: ABNORMAL
MICROSCOPIC COMMENT: ABNORMAL
NITRITE UR QL STRIP: NEGATIVE
PH UR STRIP: 7 [PH] (ref 5–8)
PROT UR QL STRIP: ABNORMAL
RBC #/AREA URNS HPF: 16 /HPF (ref 0–4)
SP GR UR STRIP: 1.01 (ref 1–1.03)
URN SPEC COLLECT METH UR: ABNORMAL
UROBILINOGEN UR STRIP-ACNC: NEGATIVE EU/DL
WBC #/AREA URNS HPF: >100 /HPF (ref 0–5)

## 2024-02-17 PROCEDURE — 87186 SC STD MICRODIL/AGAR DIL: CPT | Performed by: NURSE PRACTITIONER

## 2024-02-17 PROCEDURE — 87086 URINE CULTURE/COLONY COUNT: CPT | Performed by: NURSE PRACTITIONER

## 2024-02-17 PROCEDURE — 87077 CULTURE AEROBIC IDENTIFY: CPT | Performed by: NURSE PRACTITIONER

## 2024-02-17 PROCEDURE — 87088 URINE BACTERIA CULTURE: CPT | Performed by: NURSE PRACTITIONER

## 2024-02-17 PROCEDURE — 99283 EMERGENCY DEPT VISIT LOW MDM: CPT | Mod: 25

## 2024-02-17 PROCEDURE — 81000 URINALYSIS NONAUTO W/SCOPE: CPT | Performed by: NURSE PRACTITIONER

## 2024-02-17 PROCEDURE — 51702 INSERT TEMP BLADDER CATH: CPT

## 2024-02-17 RX ORDER — CIPROFLOXACIN 250 MG/1
250 TABLET, FILM COATED ORAL 2 TIMES DAILY
Qty: 10 TABLET | Refills: 0 | Status: SHIPPED | OUTPATIENT
Start: 2024-02-17 | End: 2024-02-22

## 2024-02-17 NOTE — ED PROVIDER NOTES
"Encounter Date: 2/17/2024       History     Chief Complaint   Patient presents with    purdy catheter malfunction      Presents with indwelling catheter that was changed here on Tuesday. Reports its "clogged" and noticed it this morning. States "I suspect I am working on a UTI as well."        Feels like Purdy catheterization stopped up.  Also feels like he is developing a UTI.  States he has had to have a Purdy catheter for the past 6 months due to retention.  He does see urology      Review of patient's allergies indicates:   Allergen Reactions    Sulfa (sulfonamide antibiotics) Rash     Past Medical History:   Diagnosis Date    Allergy     Arthritis     Cataract     Hypertension      Past Surgical History:   Procedure Laterality Date    BLADDER FULGURATION N/A 8/19/2023    Procedure: FULGURATION, BLADDER;  Surgeon: Abdulaziz Garcia MD;  Location: [facility] OR;  Service: Urology;  Laterality: N/A;    CYSTOSCOPY N/A 8/19/2023    Procedure: CYSTOSCOPY;  Surgeon: Abdulaziz Garcia MD;  Location: [facility] OR;  Service: Urology;  Laterality: N/A;    ESOPHAGOGASTRODUODENOSCOPY N/A 9/20/2023    Procedure: EGD (ESOPHAGOGASTRODUODENOSCOPY);  Surgeon: Varinder Melo MD;  Location: [facility];  Service: Gastroenterology;  Laterality: N/A;    HERNIA REPAIR N/A 9/3/2023    Procedure: REPAIR, HERNIA INTERNAL;  Surgeon: Andrew Juares MD;  Location: [facility] OR;  Service: General;  Laterality: N/A;    INSERTION OF CATHETER N/A 8/19/2023    Procedure: INSERTION, CATHETER;  Surgeon: Abdulaziz Garcia MD;  Location: [facility] OR;  Service: Urology;  Laterality: N/A;    knee surgery      right knee    LAPAROSCOPIC ROBOT-ASSISTED SURGICAL REMOVAL OF KIDNEY USING DA EVIN XI Left 8/20/2023    Procedure: XI ROBOTIC NEPHRECTOMY;  Surgeon: Abdulaziz Garcia MD;  Location: [facility] OR;  Service: Urology;  Laterality: Left;    LAPAROTOMY, EXPLORATORY N/A 9/3/2023    Procedure: LAPAROTOMY, EXPLORATORY;  Surgeon: Andrew Juares MD;  Location: " [facility] OR;  Service: General;  Laterality: N/A;     Family History   Problem Relation Age of Onset    Heart disease Mother     Cancer Paternal Grandmother         stroke    Cancer Maternal Grandmother         lung cancer    Cancer Maternal Grandfather         brain cancer     Social History     Tobacco Use    Smoking status: Former    Smokeless tobacco: Never   Substance Use Topics    Alcohol use: No    Drug use: No     Review of Systems   Constitutional:  Negative for fever.   HENT:  Negative for sore throat.    Respiratory:  Negative for shortness of breath.    Cardiovascular:  Negative for chest pain.   Gastrointestinal:  Negative for nausea.   Genitourinary:  Negative for dysuria.   Musculoskeletal:  Negative for back pain.   Skin:  Negative for rash.   Neurological:  Negative for weakness.   Hematological:  Does not bruise/bleed easily.       Physical Exam     Initial Vitals   BP Pulse Resp Temp SpO2   02/17/24 1230 02/17/24 1230 02/17/24 1230 02/17/24 1232 02/17/24 1230   95/71 61 16 97.5 °F (36.4 °C) 95 %      MAP       --                Physical Exam    Nursing note and vitals reviewed.  Constitutional: He appears well-developed and well-nourished.   HENT:   Head: Normocephalic and atraumatic.   Eyes: EOM are normal.   Neck: Neck supple.   Normal range of motion.  Cardiovascular:  Normal rate and regular rhythm.           Pulmonary/Chest: Breath sounds normal. He has no wheezes. He has no rhonchi.   Abdominal: Abdomen is soft.   Genitourinary:    Genitourinary Comments: Valdez cath in place, small amount yellow urine in bag, No suprapubic distention     Musculoskeletal:      Cervical back: Normal range of motion and neck supple.     Lymphadenopathy:     He has no cervical adenopathy.   Neurological: He is alert and oriented to person, place, and time.   Skin: Skin is warm and dry. Capillary refill takes less than 2 seconds.   Psychiatric: He has a normal mood and affect. Thought content normal.         ED  Course   Procedures  Labs Reviewed   URINALYSIS, REFLEX TO URINE CULTURE - Abnormal; Notable for the following components:       Result Value    Protein, UA 2+ (*)     Occult Blood UA 3+ (*)     Leukocytes, UA 3+ (*)     All other components within normal limits    Narrative:     Preferred Collection Type->Urine, Clean Catch  Specimen Source->Urine   URINALYSIS MICROSCOPIC - Abnormal; Notable for the following components:    RBC, UA 16 (*)     WBC, UA >100 (*)     Bacteria Moderate (*)     All other components within normal limits    Narrative:     Preferred Collection Type->Urine, Clean Catch  Specimen Source->Urine   CULTURE, URINE          Imaging Results    None          Medications - No data to display  Medical Decision Making  83-year-old with indwelling Valdez catheter who states catheter is not draining      Differential diagnoses:  Catheter malfunction, UTI    Amount and/or Complexity of Data Reviewed  Labs: ordered. Decision-making details documented in ED Course.    Risk  Prescription drug management.  Risk Details: UA shows leukocytes and RBCs, culture pending.  Catheter replaced, Cipro prescribed               ED Course as of 02/17/24 1500   Sat Feb 17, 2024   1438 Urinalysis, Reflex to Urine Culture Urine, Clean Catch(!)  + blood  + leukocytes  16 RBC  >100 WBC [NP]      ED Course User Index  [NP] Jemma Dubois FNP                           Clinical Impression:  Final diagnoses:  [T83.011A] Malfunction of Valdez catheter, initial encounter (Primary)  [N30.90] Cystitis          ED Disposition Condition    Discharge Good          ED Prescriptions       Medication Sig Dispense Start Date End Date Auth. Provider    ciprofloxacin HCl (CIPRO) 250 MG tablet Take 1 tablet (250 mg total) by mouth 2 (two) times daily. for 5 days 10 tablet 2/17/2024 2/22/2024 Jemma Dubois FNP          Follow-up Information       Follow up With Specialties Details Why Contact Info    Urology   As needed              Silvino  Jemma YODER, [facility]  02/17/24 1500

## 2024-02-20 LAB — BACTERIA UR CULT: ABNORMAL

## 2024-02-20 NOTE — TELEPHONE ENCOUNTER
Spoke with pt HH nurse Kandi. Kandi stated that she needs a note saying it's ok for her to change his catheter, the size of the catheter also. Also was inquiring about a note saying it's ok to give the pt a bath. Informed pt that I don't think that would be possible due to that not being a urological problem. Pt nurse verbalized understanding. Please advise.

## 2024-02-27 ENCOUNTER — PATIENT MESSAGE (OUTPATIENT)
Dept: UROLOGY | Facility: CLINIC | Age: 84
End: 2024-02-27
Payer: MEDICARE

## 2024-02-27 NOTE — TELEPHONE ENCOUNTER
Spoke with Kandi. Informed Kandi that I will have Dr. Lowery sign an order on tomorrow for her to be able to change his catheters. Kandi verbalized understanding.

## 2024-02-28 ENCOUNTER — TELEPHONE (OUTPATIENT)
Dept: UROLOGY | Facility: CLINIC | Age: 84
End: 2024-02-28
Payer: MEDICARE

## 2024-03-14 ENCOUNTER — CLINICAL SUPPORT (OUTPATIENT)
Dept: UROLOGY | Facility: CLINIC | Age: 84
End: 2024-03-14
Payer: MEDICARE

## 2024-03-14 DIAGNOSIS — R33.9 URINARY RETENTION: Primary | ICD-10-CM

## 2024-03-14 PROCEDURE — 87077 CULTURE AEROBIC IDENTIFY: CPT | Performed by: UROLOGY

## 2024-03-14 PROCEDURE — 99499 UNLISTED E&M SERVICE: CPT | Mod: S$GLB,,, | Performed by: UROLOGY

## 2024-03-14 PROCEDURE — 87088 URINE BACTERIA CULTURE: CPT | Performed by: UROLOGY

## 2024-03-14 PROCEDURE — 87086 URINE CULTURE/COLONY COUNT: CPT | Performed by: UROLOGY

## 2024-03-14 PROCEDURE — 87186 SC STD MICRODIL/AGAR DIL: CPT | Performed by: UROLOGY

## 2024-03-14 NOTE — PROGRESS NOTES
Pt arrived to clinic to have catheter change, he states home health changed it on Friday 3/8. Patient states he is experiencing UTI symptoms. Pt draped and removed leg bag, collected urine from catheter. Stat lock and leg bag reattached. Patient tolerated well. Urine sent to lab for urine culture.

## 2024-03-18 ENCOUNTER — PATIENT MESSAGE (OUTPATIENT)
Dept: UROLOGY | Facility: CLINIC | Age: 84
End: 2024-03-18
Payer: MEDICARE

## 2024-03-18 LAB — BACTERIA UR CULT: ABNORMAL

## 2024-03-20 ENCOUNTER — OFFICE VISIT (OUTPATIENT)
Dept: URGENT CARE | Facility: CLINIC | Age: 84
End: 2024-03-20
Payer: MEDICARE

## 2024-03-20 VITALS
HEIGHT: 69 IN | OXYGEN SATURATION: 100 % | RESPIRATION RATE: 16 BRPM | HEART RATE: 78 BPM | WEIGHT: 142 LBS | BODY MASS INDEX: 21.03 KG/M2 | TEMPERATURE: 98 F | SYSTOLIC BLOOD PRESSURE: 112 MMHG | DIASTOLIC BLOOD PRESSURE: 74 MMHG

## 2024-03-20 DIAGNOSIS — R31.9 URINARY TRACT INFECTION WITH HEMATURIA, SITE UNSPECIFIED: ICD-10-CM

## 2024-03-20 DIAGNOSIS — R30.9 PAINFUL URINATION: Primary | ICD-10-CM

## 2024-03-20 DIAGNOSIS — N39.0 URINARY TRACT INFECTION WITH HEMATURIA, SITE UNSPECIFIED: ICD-10-CM

## 2024-03-20 LAB
BILIRUB UR QL STRIP: NEGATIVE
GLUCOSE UR QL STRIP: NEGATIVE
KETONES UR QL STRIP: NEGATIVE
LEUKOCYTE ESTERASE UR QL STRIP: POSITIVE
PH, POC UA: 5.5
POC BLOOD, URINE: POSITIVE
POC NITRATES, URINE: POSITIVE
PROT UR QL STRIP: POSITIVE
SP GR UR STRIP: 1.02 (ref 1–1.03)
UROBILINOGEN UR STRIP-ACNC: NORMAL (ref 0.3–2.2)

## 2024-03-20 PROCEDURE — 87077 CULTURE AEROBIC IDENTIFY: CPT

## 2024-03-20 PROCEDURE — 87088 URINE BACTERIA CULTURE: CPT

## 2024-03-20 PROCEDURE — 99204 OFFICE O/P NEW MOD 45 MIN: CPT | Mod: 25,S$GLB,,

## 2024-03-20 PROCEDURE — 81003 URINALYSIS AUTO W/O SCOPE: CPT | Mod: QW,S$GLB,,

## 2024-03-20 PROCEDURE — 87086 URINE CULTURE/COLONY COUNT: CPT

## 2024-03-20 PROCEDURE — 87186 SC STD MICRODIL/AGAR DIL: CPT

## 2024-03-20 RX ORDER — SULFAMETHOXAZOLE AND TRIMETHOPRIM 800; 160 MG/1; MG/1
1 TABLET ORAL 2 TIMES DAILY
Qty: 28 TABLET | Refills: 0 | Status: SHIPPED | OUTPATIENT
Start: 2024-03-20 | End: 2024-04-03

## 2024-03-20 NOTE — PROGRESS NOTES
"Subjective:       Patient ID: Josh Pnito is a 84 y.o. male.    Vitals:  height is 5' 9" (1.753 m) and weight is 64.4 kg (142 lb). His oral temperature is 97.8 °F (36.6 °C). His blood pressure is 112/74 and his pulse is 78. His respiration is 16 and oxygen saturation is 100%.     Chief Complaint: Urinary Tract Infection (States he was seen at the urologist's office in [place] and they did a urine culture.  He got his results through the portal but hasn't been treated.  States he has a catheter and his urine is cloudy and he's sore.  )    This is a 84 y.o. male who presents today with a chief complaint of States he was seen at the urologist's office in [place] and they did a urine culture.  He got his results through the portal but hasn't been treated.  States he has a catheter and his urine is cloudy and he's sore.    Patient presents with:  Urinary Tract Infection: States he was seen at the urologist's office in [place] and they did a urine culture.  He got his results through the portal but hasn't been treated.  States he has a catheter and his urine is cloudy with a foul odor and his penis is sore.  Patient denies any fever, abdominal pain, flank pain or blood in urine. Patient states that he does get frequent utis due to having to self cath. Patient states that he has been trying to get in touch with his urologist but has not heard back. Patient stats that he is not allergic to sulf and took this medication several years ago and stayed out in the sun causing him to have a rash. Patient states that he has taken this medication since and has tolerated this well.           Urinary Tract Infection   This is a new problem. The current episode started in the past 7 days. The problem has been gradually worsening. The pain is at a severity of 8/10. The pain is severe. There has been no fever. He has tried nothing for the symptoms. The treatment provided no relief. His past medical history is significant for " catheterization.       Constitution: Negative.   HENT: Negative.     Neck: neck negative.   Cardiovascular: Negative.    Eyes: Negative.    Respiratory: Negative.     Gastrointestinal: Negative.    Endocrine: negative.   Genitourinary:  Positive for dysuria.        Foul, cloudy urine with pain   Musculoskeletal: Negative.    Skin: Negative.    Allergic/Immunologic: Negative.    Neurological: Negative.    Hematologic/Lymphatic: Negative.    Psychiatric/Behavioral: Negative.             Objective:      Physical Exam   Constitutional: He is oriented to person, place, and time.   HENT:   Head: Normocephalic and atraumatic.   Nose: Nose normal.   Eyes: Conjunctivae are normal. Pupils are equal, round, and reactive to light. Extraocular movement intact   Cardiovascular: Normal rate and normal pulses.   Pulmonary/Chest: Effort normal.   Abdominal: Normal appearance. Soft. There is no abdominal tenderness.   Musculoskeletal: Normal range of motion.         General: Normal range of motion.   Neurological: no focal deficit. He is alert and oriented to person, place, and time.   Skin: Skin is warm.   Psychiatric: His behavior is normal. Mood, judgment and thought content normal.   Nursing note and vitals reviewed.        Past medical history and current medications reviewed.       Assessment:           1. Painful urination    2. Urinary tract infection with hematuria, site unspecified          Results for orders placed or performed in visit on 03/20/24   POCT Urinalysis, Dipstick, Automated, W/O Scope   Result Value Ref Range    POC Blood, Urine Positive (A) Negative    POC Bilirubin, Urine Negative Negative    POC Urobilinogen, Urine Normal 0.3 - 2.2    POC Ketones, Urine Negative Negative    POC Protein, Urine Positive (A) Negative    POC Nitrates, Urine Positive (A) Negative    POC Glucose, Urine Negative Negative    pH, UA 5.5     POC Specific Gravity, Urine 1.020 1.003 - 1.029    POC Leukocytes, Urine Positive (A)  Negative        Plan:         Painful urination  -     sulfamethoxazole-trimethoprim 800-160mg (BACTRIM DS) 800-160 mg Tab; Take 1 tablet by mouth 2 (two) times daily. for 14 days  Dispense: 28 tablet; Refill: 0  -     Urine culture  -     Cancel: POCT URINALYSIS  -     POCT Urinalysis, Dipstick, Automated, W/O Scope    Urinary tract infection with hematuria, site unspecified             Patient Instructions   Recommend increased intake of fluids.    Follow up with your urologist within 3-5 days or sooner if needed  If you were prescribed antibiotics take them as directed to completion.    You may take azo OTC as directed for painful urination unless you were prescribe something for these symptoms by the provider.  Follow-up with PCP or return to clinic if no improvement in symptoms over the next 3 days.

## 2024-03-20 NOTE — PATIENT INSTRUCTIONS
Recommend increased intake of fluids.    Follow up with your urologist within 3-5 days or sooner if needed  If you were prescribed antibiotics take them as directed to completion.    You may take azo OTC as directed for painful urination unless you were prescribe something for these symptoms by the provider.  Follow-up with PCP or return to clinic if no improvement in symptoms over the next 3 days.

## 2024-03-23 LAB — BACTERIA UR CULT: ABNORMAL

## 2024-04-27 ENCOUNTER — PATIENT MESSAGE (OUTPATIENT)
Dept: UROLOGY | Facility: CLINIC | Age: 84
End: 2024-04-27
Payer: MEDICARE

## 2024-05-14 ENCOUNTER — TELEPHONE (OUTPATIENT)
Dept: CARDIOLOGY | Facility: CLINIC | Age: 84
End: 2024-05-14
Payer: MEDICARE

## 2024-05-14 NOTE — TELEPHONE ENCOUNTER
----- Message from Jagdish Love sent at 5/14/2024  2:41 PM CDT -----  Type:  Patient Returning Call    Who Called:  pt  Who Left Message for Patient:  Ashlee  Does the patient know what this is regarding?:  yes  Best Call Back Number:  [phone] (home)     Additional Information:  please call and advise--thank you

## 2024-05-16 ENCOUNTER — OFFICE VISIT (OUTPATIENT)
Dept: UROLOGY | Facility: CLINIC | Age: 84
End: 2024-05-16
Payer: MEDICARE

## 2024-05-16 VITALS — WEIGHT: 142 LBS | HEIGHT: 69 IN | BODY MASS INDEX: 21.03 KG/M2

## 2024-05-16 DIAGNOSIS — R33.9 URINARY RETENTION: Primary | ICD-10-CM

## 2024-05-16 DIAGNOSIS — C64.2 RENAL CELL CARCINOMA OF LEFT KIDNEY: ICD-10-CM

## 2024-05-16 PROCEDURE — G2211 COMPLEX E/M VISIT ADD ON: HCPCS | Mod: S$GLB,,, | Performed by: UROLOGY

## 2024-05-16 PROCEDURE — 1160F RVW MEDS BY RX/DR IN RCRD: CPT | Mod: CPTII,S$GLB,, | Performed by: UROLOGY

## 2024-05-16 PROCEDURE — 1101F PT FALLS ASSESS-DOCD LE1/YR: CPT | Mod: CPTII,S$GLB,, | Performed by: UROLOGY

## 2024-05-16 PROCEDURE — 99215 OFFICE O/P EST HI 40 MIN: CPT | Mod: S$GLB,,, | Performed by: UROLOGY

## 2024-05-16 PROCEDURE — 1126F AMNT PAIN NOTED NONE PRSNT: CPT | Mod: CPTII,S$GLB,, | Performed by: UROLOGY

## 2024-05-16 PROCEDURE — 3288F FALL RISK ASSESSMENT DOCD: CPT | Mod: CPTII,S$GLB,, | Performed by: UROLOGY

## 2024-05-16 PROCEDURE — 99999 PR PBB SHADOW E&M-EST. PATIENT-LVL IV: CPT | Mod: PBBFAC,,, | Performed by: UROLOGY

## 2024-05-16 PROCEDURE — 1159F MED LIST DOCD IN RCRD: CPT | Mod: CPTII,S$GLB,, | Performed by: UROLOGY

## 2024-05-16 NOTE — PROGRESS NOTES
[facility] Established Patient/H&P:    Josh Pinto is a 84 y.o. male who presents for follow up for urinary retention.     Patient with a several year history of left renal RCC and recurrent urinary retention who presents to [facility] care.      He was previously managed by Dr. Escobar and Dr. Garcia. On review of records, he underwent robotic radical left nephrectomy on 8/20/23 with Dr. Garcia. 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       He reports having a catheter placed prior to his surgery that has remained in place. He was offered TURP, but states  he no longer wants to follow up with Dr. Garcia.      Patient also required a catheter in 2015 that was managed by Dr. Escobar. He was on Cardura 4 mg. Also has a history of elevated PSA. He is now only managed with Finasteride 5 mg PO daily. He has a prescription for Flomax, but has stopped due to hypotension.      He underwent cystoscopy and bladder fulguration on 8/19/23 with Dr. Garcia. 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 Prostate volume 280 cc on US bladder on 9/25/23. CT abdomen pelvis without contrast on 9/17/23 with no recurrence of his renal cell carcinoma.          Interval History     12/15/23:Plan at initial visit was to continue Finasteride 5 mg and return for possible voiding trial on 1/27/23 with follow up with me in 3 months. He states that he has had issues with leaking around his catheter after each home health exchange. Referred here for evaluation. He has a 20 Lao in place. No recent infections.     5/16/24: He underwent CIC teaching on 1/12/24 in clinic. However, he developed some hematuria and decision was made to just replace an indwelling purdy. He has continued with purdy exchanges per home health. Not interested in surgical therapy at this  time. He is on Flomax 0.4 mg and Finasteride 5 mg PO daily.      Denies any fever, chills, gross hematuria, flank pain, bone pain, unintentional weight loss,  trauma or history of  malignancy.         IPSS    QoL  34        5          11/17/23     PSA  3.5                   8/15/23    Urine culture  MRSA  3/20/24  MRSA  3/14/24  Pseudo 2/17/24    Past Medical History:   Diagnosis Date    Allergy     Arthritis     Cataract     Hypertension        Past Surgical History:   Procedure Laterality Date    BLADDER FULGURATION N/A 8/19/2023    Procedure: FULGURATION, BLADDER;  Surgeon: Abdulaziz Garcia MD;  Location: [facility] OR;  Service: Urology;  Laterality: N/A;    CYSTOSCOPY N/A 8/19/2023    Procedure: CYSTOSCOPY;  Surgeon: Abdulaziz aGrcia MD;  Location: [facility] OR;  Service: Urology;  Laterality: N/A;    ESOPHAGOGASTRODUODENOSCOPY N/A 9/20/2023    Procedure: EGD (ESOPHAGOGASTRODUODENOSCOPY);  Surgeon: Varinder Melo MD;  Location: [facility];  Service: Gastroenterology;  Laterality: N/A;    HERNIA REPAIR N/A 9/3/2023    Procedure: REPAIR, HERNIA INTERNAL;  Surgeon: Andrew Juares MD;  Location: [facility] OR;  Service: General;  Laterality: N/A;    INSERTION OF CATHETER N/A 8/19/2023    Procedure: INSERTION, CATHETER;  Surgeon: Abdulaziz Garcia MD;  Location: [facility];  Service: Urology;  Laterality: N/A;    knee surgery      right knee    LAPAROSCOPIC ROBOT-ASSISTED SURGICAL REMOVAL OF KIDNEY USING DA EVIN XI Left 8/20/2023    Procedure: XI ROBOTIC NEPHRECTOMY;  Surgeon: Abdulaziz Garcia MD;  Location: [facility] OR;  Service: Urology;  Laterality: Left;    LAPAROTOMY, EXPLORATORY N/A 9/3/2023    Procedure: LAPAROTOMY, EXPLORATORY;  Surgeon: Andrew Juares MD;  Location: [facility] OR;  Service: General;  Laterality: N/A;       Review of patient's allergies indicates:   Allergen Reactions    Sulfa (sulfonamide antibiotics) Rash       Medications Reviewed: see MAR    FOCUSED PHYSICAL EXAM:    There were no vitals  "filed for this visit.  Body mass index is 20.97 kg/m². Weight: 64.4 kg (141 lb 15.6 oz) Height: 5' 9" (175.3 cm)       General: Alert, cooperative, no distress, appears stated age  Abdomen: Soft, non-tender, no CVA tenderness, non-distended        LABS:    No results found for this or any previous visit (from the past 336 hour(s)).        Assessment/Diagnosis:    1. Urinary retention        2. Renal cell carcinoma of left kidney            Plans:    - I spent 40 minutes of the day of this encounter preparing for, treating and managing this patient. Visit today included increased complexity associated with the care of the episodic problem addressed and managing the longitudinal care of the patient due to the serious and/or complex managed problem(s) urinary retention and renal cell carcinoma. Extensive discussion with patient regarding the etiology and management of his lower urinary tract symptoms. Explained that LUTS are multifactorial and can be secondary to an enlarged prostate, PO intake of bladder irritants, overactive bladder, constipation, malignancy, trauma, infection, stones or medications. We discussed that there is a potential benefit to further evaluation with cystoscopy and TRUS. Not interested in any surgical procedures on his prostate at this time.   - Continue Flomax 0.4 mg and Finasteride 5 mg PO daily.   - Valdez catheter removed. CIC teaching performed today per nursing using a 14 Azeri coude. Will CIC 3 times daily.   - CXR, CT abdomen pelvis, CBC and CMP next available for surveillance of his renal cell carcinoma.  - RTC in 3 months with symptom score and PVR.         "

## 2024-05-17 ENCOUNTER — PATIENT MESSAGE (OUTPATIENT)
Dept: UROLOGY | Facility: CLINIC | Age: 84
End: 2024-05-17
Payer: MEDICARE

## 2024-05-20 ENCOUNTER — HOSPITAL ENCOUNTER (OUTPATIENT)
Dept: RADIOLOGY | Facility: HOSPITAL | Age: 84
Discharge: HOME | End: 2024-05-20
Attending: UROLOGY
Payer: MEDICARE

## 2024-05-20 DIAGNOSIS — C64.2 RENAL CELL CARCINOMA OF LEFT KIDNEY: ICD-10-CM

## 2024-05-20 PROCEDURE — 71046 X-RAY EXAM CHEST 2 VIEWS: CPT | Mod: 26,,, | Performed by: RADIOLOGY

## 2024-05-20 PROCEDURE — 74176 CT ABD & PELVIS W/O CONTRAST: CPT | Mod: TC

## 2024-05-20 PROCEDURE — 74176 CT ABD & PELVIS W/O CONTRAST: CPT | Mod: 26,,, | Performed by: RADIOLOGY

## 2024-05-20 PROCEDURE — 71046 X-RAY EXAM CHEST 2 VIEWS: CPT | Mod: TC

## 2024-05-22 ENCOUNTER — CLINICAL SUPPORT (OUTPATIENT)
Dept: UROLOGY | Facility: CLINIC | Age: 84
End: 2024-05-22
Payer: MEDICARE

## 2024-05-22 DIAGNOSIS — R33.9 URINARY RETENTION: Primary | ICD-10-CM

## 2024-05-22 LAB — POC RESIDUAL URINE VOLUME: 87 ML (ref 0–100)

## 2024-05-22 PROCEDURE — 99499 UNLISTED E&M SERVICE: CPT | Mod: S$GLB,,, | Performed by: UROLOGY

## 2024-05-22 PROCEDURE — 51798 US URINE CAPACITY MEASURE: CPT | Mod: S$GLB,,, | Performed by: UROLOGY

## 2024-05-22 NOTE — PROGRESS NOTES
Patient arrived to clinic to do PVR, went to use the bathroom first, bladder scan done, resulted 87 ml.Strict instructions to call us (if during clinic hours) or go to ED (if afterhours) if can't void for 6 hrs.

## 2024-06-04 ENCOUNTER — OFFICE VISIT (OUTPATIENT)
Dept: CARDIOLOGY | Facility: CLINIC | Age: 84
End: 2024-06-04
Payer: MEDICARE

## 2024-06-04 VITALS
SYSTOLIC BLOOD PRESSURE: 141 MMHG | OXYGEN SATURATION: 98 % | WEIGHT: 152.31 LBS | DIASTOLIC BLOOD PRESSURE: 77 MMHG | HEIGHT: 69 IN | HEART RATE: 60 BPM | BODY MASS INDEX: 22.56 KG/M2

## 2024-06-04 DIAGNOSIS — D50.0 IRON DEFICIENCY ANEMIA DUE TO CHRONIC BLOOD LOSS: ICD-10-CM

## 2024-06-04 DIAGNOSIS — I48.0 PAF (PAROXYSMAL ATRIAL FIBRILLATION): Primary | ICD-10-CM

## 2024-06-04 DIAGNOSIS — Z82.0 FAMILY HISTORY OF SLEEP APNEA: ICD-10-CM

## 2024-06-04 DIAGNOSIS — Z91.89 AT RISK FOR CARDIOVASCULAR EVENT: ICD-10-CM

## 2024-06-04 DIAGNOSIS — N18.4 STAGE 4 CHRONIC KIDNEY DISEASE: ICD-10-CM

## 2024-06-04 DIAGNOSIS — I10 PRIMARY HYPERTENSION: ICD-10-CM

## 2024-06-04 DIAGNOSIS — Z91.89 SEDENTARY LIFESTYLE: ICD-10-CM

## 2024-06-04 DIAGNOSIS — G47.19 EXCESSIVE DAYTIME SLEEPINESS: ICD-10-CM

## 2024-06-04 PROBLEM — E87.5 HYPERKALEMIA: Status: RESOLVED | Noted: 2023-10-09 | Resolved: 2024-06-04

## 2024-06-04 PROBLEM — Z79.899 LONG TERM CURRENT USE OF AMIODARONE: Status: RESOLVED | Noted: 2023-12-14 | Resolved: 2024-06-04

## 2024-06-04 PROCEDURE — 99999 PR PBB SHADOW E&M-EST. PATIENT-LVL V: CPT | Mod: PBBFAC,,, | Performed by: INTERNAL MEDICINE

## 2024-06-04 PROCEDURE — 93000 ELECTROCARDIOGRAM COMPLETE: CPT | Mod: S$GLB,,, | Performed by: INTERNAL MEDICINE

## 2024-06-04 PROCEDURE — 1159F MED LIST DOCD IN RCRD: CPT | Mod: CPTII,S$GLB,, | Performed by: INTERNAL MEDICINE

## 2024-06-04 PROCEDURE — 3078F DIAST BP <80 MM HG: CPT | Mod: CPTII,S$GLB,, | Performed by: INTERNAL MEDICINE

## 2024-06-04 PROCEDURE — 1101F PT FALLS ASSESS-DOCD LE1/YR: CPT | Mod: CPTII,S$GLB,, | Performed by: INTERNAL MEDICINE

## 2024-06-04 PROCEDURE — 3075F SYST BP GE 130 - 139MM HG: CPT | Mod: CPTII,S$GLB,, | Performed by: INTERNAL MEDICINE

## 2024-06-04 PROCEDURE — 99214 OFFICE O/P EST MOD 30 MIN: CPT | Mod: 25,S$GLB,, | Performed by: INTERNAL MEDICINE

## 2024-06-04 PROCEDURE — 3288F FALL RISK ASSESSMENT DOCD: CPT | Mod: CPTII,S$GLB,, | Performed by: INTERNAL MEDICINE

## 2024-06-04 RX ORDER — POTASSIUM CHLORIDE 20 MEQ/1
20 TABLET, EXTENDED RELEASE ORAL EVERY MORNING
COMMUNITY
Start: 2024-05-09

## 2024-06-04 RX ORDER — MIDODRINE HYDROCHLORIDE 5 MG/1
5 TABLET ORAL 3 TIMES DAILY
COMMUNITY
Start: 2024-02-24 | End: 2024-06-19

## 2024-06-04 RX ORDER — FLUDROCORTISONE ACETATE 0.1 MG/1
100 TABLET ORAL EVERY MORNING
COMMUNITY
Start: 2024-05-09

## 2024-06-04 NOTE — LETTER
June 4, 2024      Khanh Chang MD  [address]  [facility] [place] [address]           [place] [facility]  [address]  [place] [address]  Phone: [phone]  Fax: [phone]          Patient: Josh Pinto   MR Number: [id number]   YOB: 1940   Date of Visit: 6/4/2024       Dear Aaareferral Self:    Thank you for referring Josh Pinto to me for evaluation. Attached you will find relevant portions of my assessment and plan of care.    If you have questions, please do not hesitate to call me. I look forward to following Josh Pinto along with you.    Sincerely,    Johnathan Angel MD    Enclosure  CC:    No Recipients    If you would like to receive this communication electronically, please contact [email or web address] or [phone] to request more information on [employer] Link access.    For providers and/or their staff who would like to refer a patient to [facility], please contact us through our one-stop-shop provider referral line, [facility] , at [phone].    If you feel you have received this communication in error or would no longer like to receive these types of communications, please e-mail [email or web address]

## 2024-06-04 NOTE — PROGRESS NOTES
Subjective:    Patient ID:  Josh Pinto is a 84 y.o. male who presents for evaluation of Follow-up (6 months)  Came on own for establish CV care  PCP: Khanh Chang MD   Prior cardiologist: Himanshu Murphy MD, last seen 9/8/2023, wants change due to distance  Urologist: Maura Lowery Jr., MD  Lives alone, no pet  2 sisters, ANUJ and [place]  Retired     SDOH: have medication confusion, 2 BB listed.  Health literacy: high   Vaccinations: up-to-date, completed COVID, no infection  Activities: do own house work, no yard work, mostly sitting watching TV, no problem, no limitation.  Nicotine: 30 years 2 ppd, quit 1990  Alcohol: none  Illicit drugs: none  Cardiac symptoms: none, had PAF with severe illness   Home BP: home log reviewed 113 to 119/70 to 74  Medication compliance: yes  Diet: regular, use salt  Caffeine: little  Labs:   Lab Results   Component Value Date    TSH 0.754 09/03/2023        Lab Results   Component Value Date    HGBA1C 6.2 02/09/2015       Lab Results   Component Value Date    WBC 6.96 05/20/2024    HGB 13.2 (L) 05/20/2024    HCT 41.1 05/20/2024    MCV 97 05/20/2024     05/20/2024       CMP  Sodium   Date Value Ref Range Status   05/20/2024 139 136 - 145 mmol/L Final   11/30/2023 132 (L) 136 - 145 mmol/L Final   10/17/2023 139 136 - 145 mmol/L Final     Potassium   Date Value Ref Range Status   05/20/2024 4.5 3.5 - 5.1 mmol/L Final   11/30/2023 4.6 3.5 - 5.1 mmol/L Final   10/17/2023 3.2 (L) 3.5 - 5.1 mmol/L Final     Comment:     Anion Gap reference range revised on 4/28/2023     Chloride   Date Value Ref Range Status   05/20/2024 108 95 - 110 mmol/L Final   11/30/2023 100 95 - 110 mmol/L Final   10/17/2023 112 (H) 95 - 110 mmol/L Final     CO2   Date Value Ref Range Status   05/20/2024 21 (L) 23 - 29 mmol/L Final   11/30/2023 18 (L) 23 - 29 mmol/L Final   10/17/2023 24 22 - 31 mmol/L Final     Glucose   Date Value Ref Range Status   05/20/2024 88 70 - 110 mg/dL  Final   11/30/2023 161 (H) 70 - 110 mg/dL Final   10/17/2023 82 70 - 110 mg/dL Final     Comment:     The ADA recommends the following guidelines for fasting glucose:    Normal:       less than 100 mg/dL    Prediabetes:  100 mg/dL to 125 mg/dL    Diabetes:     126 mg/dL or higher       BUN   Date Value Ref Range Status   05/20/2024 40 (H) 8 - 23 mg/dL Final   11/30/2023 30 (H) 8 - 23 mg/dL Final   10/17/2023 17 9 - 21 mg/dL Final     Creatinine   Date Value Ref Range Status   05/20/2024 2.8 (H) 0.5 - 1.4 mg/dL Final   11/30/2023 2.2 (H) 0.5 - 1.4 mg/dL Final   10/17/2023 2.11 (H) 0.50 - 1.40 mg/dL Final     Calcium   Date Value Ref Range Status   05/20/2024 9.5 8.7 - 10.5 mg/dL Final   11/30/2023 9.1 8.7 - 10.5 mg/dL Final   10/17/2023 7.6 (L) 8.4 - 10.2 mg/dL Final     Total Protein   Date Value Ref Range Status   05/20/2024 7.2 6.0 - 8.4 g/dL Final   11/30/2023 7.3 6.0 - 8.4 g/dL Final   10/17/2023 5.1 (L) 6.0 - 8.4 g/dL Final     Albumin   Date Value Ref Range Status   05/20/2024 3.3 (L) 3.5 - 5.2 g/dL Final   11/30/2023 2.4 (L) 3.5 - 5.2 g/dL Final   10/17/2023 2.1 (L) 3.5 - 5.2 g/dL Final     Total Bilirubin   Date Value Ref Range Status   05/20/2024 1.1 (H) 0.1 - 1.0 mg/dL Final     Comment:     For infants and newborns, interpretation of results should be based  on gestational age, weight and in agreement with clinical  observations.    Premature Infant recommended reference ranges:  Up to 24 hours.............<8.0 mg/dL  Up to 48 hours............<12.0 mg/dL  3-5 days..................<15.0 mg/dL  6-29 days.................<15.0 mg/dL     11/30/2023 0.8 0.1 - 1.0 mg/dL Final     Comment:     For infants and newborns, interpretation of results should be based  on gestational age, weight and in agreement with clinical  observations.    Premature Infant recommended reference ranges:  Up to 24 hours.............<8.0 mg/dL  Up to 48 hours............<12.0 mg/dL  3-5 days..................<15.0 mg/dL  6-29  "days.................<15.0 mg/dL     10/17/2023 0.2 0.2 - 1.3 mg/dL Final     Alkaline Phosphatase   Date Value Ref Range Status   05/20/2024 140 (H) 55 - 135 U/L Final   11/30/2023 250 (H) 55 - 135 U/L Final   10/17/2023 89 38 - 145 U/L Final     AST   Date Value Ref Range Status   05/20/2024 69 (H) 10 - 40 U/L Final   11/30/2023 30 10 - 40 U/L Final   10/17/2023 24 17 - 59 U/L Final     ALT   Date Value Ref Range Status   05/20/2024 106 (H) 10 - 44 U/L Final   11/30/2023 37 10 - 44 U/L Final   10/17/2023 16 0 - 50 U/L Final     Anion Gap   Date Value Ref Range Status   05/20/2024 10 8 - 16 mmol/L Final   11/30/2023 14 8 - 16 mmol/L Final   10/17/2023 3 (L) 5 - 12 mmol/L Final     Comment:     Anion Gap reference range revised on 4/28/2023     eGFR   Date Value Ref Range Status   05/20/2024 21.8 (A) >60 mL/min/1.73 m^2 Final   11/30/2023 29.4 (A) >60 mL/min/1.73 m^2 Final   10/17/2023 31 (A) >60 mL/min/1.73 m^2 Final     @labrcntip(troponini)@  No results found for: "BNP"}   Lab Results   Component Value Date    CHOL 138 12/14/2023    CHOL 164 02/09/2015     Lab Results   Component Value Date    HDL 37 (L) 12/14/2023    HDL 37 (L) 02/09/2015     Lab Results   Component Value Date    LDLCALC 85.6 12/14/2023    LDLCALC 111.6 02/09/2015     Lab Results   Component Value Date    TRIG 77 12/14/2023    TRIG 77 02/09/2015     Lab Results   Component Value Date    CHOLHDL 26.8 12/14/2023    CHOLHDL 22.6 02/09/2015       Last Echo: 9/2023  Last stress test: none  Cardiovascular angiogram: none  ECG: NSR, rate 59, normal  Fundoscopic exam: within the past year, negative for retinopathy    In 12/2023:  AAM came on own to establish CV care due to distance. Had multiple hospitalizations since 8/2023 due to recurrent syncope with chronic UTI post left nephrectomy for renal cell CA. Had PAF prior to surgery in 8/2023, complicated by severe hematuria.  No noted CV problem since home. Denies any CP nor SOB. No premature family " "history for CAD nor CVA. Mother  at age 96 with CHF.    Maura Lowery Jr., MD noted 2023 "We discussed that his prostate volume is 280 cc. Thus he is not a candidate for TURP or any minimally invasive surgeries. Not a good surgical candidate for simple prostatectomy at this time.   - Continue Finasteride 5 mg PO daily.   - Continue home health catheter exchanges every 4 weeks.   - RTC in 6 weeks for voiding trial as nurse visit in AM.   - Will need contrasted imaging for his history of RCC in 3 - 6 months."    Last ED 2023, noted "84 y/o man with CKD3, afib, HTN, BPH who presents here with complaints of syncopal episode and orthostatic hypotension. Patient reports he was recently discharged after prolonged hospitalization to SNF facility. Patient says he was doing well for a few days but today felt very weak. Patient says he was put in chair by therapy and about an hour later began to feel very weak. Patient was found unresponsive. Patient had noted orthostatic hypotension. Patient transferred to ED for further evaluation.     VS - 114/64  82  20  98 °F (36.7 °C)  (!) 92 %    CXR - Cardiomediastinal silhouette within normal limits.  Lungs clear.  No pleural effusion or pneumothorax.    He does appear to have a urinary tract infection.  His vital signs here are good, he voices no complaints and states he feels normal.  Patient has been given 1 g Rocephin here, and IV fluids.  Orthostatic vital signs were normal.  I believe he is safe for discharge home."    Himanshu Murphy MD noted 2023 - Assessment:   (Paroxysmal atrial fibrillation   Remains in sinus rhythm-sinus bradycardia.  On amiodarone and low-dose metoprolol as well as Lovenox.).      Plan:    Keep amiodarone 200 b.i.d. x1 week, then 200 mg p.o. daily thereafter.  Continue low-dose beta-blocker  Transition to either Eliquis or Xarelto if no further surgical procedures are necessary  Will follow-up in the clinic in 3-4 weeks with " "either Klarissa Trotter or me    Active Hospital Problems     Diagnosis     POA  ·  *SBO (small bowel obstruction) [K56.609]    ·  ACP (advance care planning) [Z71.89]     ·  PAF (paroxysmal atrial fibrillation) [I48.0]     ·  Septic shock [A41.9, R65.21]     ·  Acute cystitis without hematuria [N30.00]     ·  HCAP (healthcare-associated pneumonia) [J18.9]    ·  Urinary retention [R33.9]     ·  SHAINA (acute kidney injury) [N17.9]     ·  Atrial fibrillation with RVR [I48.91]     ·  HTN (hypertension) [I10]     ·  Renal malignant tumor [C64.9]    Paroxysmal atrial fibrillation.  Agree with Cardizem as well as amiodarone.  Anticoagulation to be resumed once it is okay with surgery.    As long as he is hemodynamically stable, no further intervention.  If he remains in atrial fibrillation, attempt for electrical cardioversion should be made as an outpatient after being anticoagulated for 3-4 weeks    Echo Left Ventricle: The left ventricle is normal in size. Normal wall motion. There is low normal systolic function with a visually estimated ejection fraction of 50 - 55%. There is normal diastolic function.  ·  Right Ventricle: Normal right ventricular cavity size. Systolic function is normal.  ·  IVC/SVC: Normal venous pressure at 3 mmHg.    HPI Comments: in 6/2024, return for 6-months review. Feeling "great". Excellent BP at home. Not very active, likes his TV (have 4 of them). No heart worries.    Review of Systems   Constitutional: Positive for chills and weight gain (up 12 lbs with Ensure). Negative for diaphoresis, fever, malaise/fatigue, night sweats and weight loss.   HENT:  Negative for nosebleeds and tinnitus.    Eyes:  Negative for visual disturbance.   Cardiovascular:  Negative for chest pain, claudication, cyanosis, dyspnea on exertion, irregular heartbeat, leg swelling, near-syncope, orthopnea, palpitations and paroxysmal nocturnal dyspnea. Syncope: with infections.  Respiratory:  Negative for cough, shortness of " breath, sleep disturbances due to breathing, snoring and wheezing.         [place] score 8 up to 14, awaken refreshed. Sister have GISELA   Endocrine: Negative for polydipsia and polyuria.   Hematologic/Lymphatic: Does not bruise/bleed easily.   Skin:  Negative for color change, flushing, nail changes, poor wound healing and suspicious lesions.   Musculoskeletal:  Negative for arthritis, falls, gout, joint pain, joint swelling, muscle cramps, muscle weakness and myalgias.   Gastrointestinal:  Negative for heartburn, hematemesis, hematochezia, melena and nausea.   Genitourinary:  Positive for frequency.   Neurological:  Negative for disturbances in coordination, excessive daytime sleepiness, focal weakness, headaches, light-headedness, loss of balance, numbness, vertigo and weakness. Dizziness: with infections..  Psychiatric/Behavioral:  Negative for depression and substance abuse. The patient does not have insomnia and is not nervous/anxious.    Allergic/Immunologic: Positive for environmental allergies.        Objective:    Physical Exam  Constitutional:       Appearance: He is well-developed.      Comments: RA O2 sat 98%  Orthostatic VS: sitting 138/71, standing 130/74   HENT:      Head: Normocephalic.   Eyes:      Conjunctiva/sclera: Conjunctivae normal.      Pupils: Pupils are equal, round, and reactive to light.   Neck:      Thyroid: No thyromegaly.      Vascular: No JVD.   Cardiovascular:      Rate and Rhythm: Normal rate and regular rhythm.      Pulses: Intact distal pulses.           Carotid pulses are 1+ on the right side and 1+ on the left side.       Radial pulses are 1+ on the right side and 1+ on the left side.        Dorsalis pedis pulses are 1+ on the right side and 1+ on the left side.        Posterior tibial pulses are 1+ on the right side and 1+ on the left side.      Heart sounds: Normal heart sounds. No murmur heard.     No friction rub. No gallop.   Pulmonary:      Effort: Pulmonary effort is  "normal.      Breath sounds: No rales.      Comments: Diminished breath sounds and prolong expiration.  Chest:      Chest wall: No tenderness.   Abdominal:      General: Bowel sounds are normal.      Palpations: Abdomen is soft.      Tenderness: There is no abdominal tenderness.      Comments: Waist 35" up to 37"   Musculoskeletal:         General: Normal range of motion.      Cervical back: Normal range of motion and neck supple.   Lymphadenopathy:      Cervical: No cervical adenopathy.   Skin:     General: Skin is warm and dry.      Findings: No rash.   Neurological:      Mental Status: He is alert and oriented to person, place, and time.           Assessment:       1. PAF (paroxysmal atrial fibrillation), [id number] score 3    2. Sedentary lifestyle    3. Iron deficiency anemia due to chronic blood loss    4. Stage 4 chronic kidney disease    5. Excessive daytime sleepiness    6. Family history of sleep apnea    7. At risk for cardiovascular event    8. Primary hypertension         Plan:       Josh was seen today for follow-up.    Diagnoses and all orders for this visit:    PAF (paroxysmal atrial fibrillation), [id number] score 3  -     IN OFFICE EKG 12-LEAD (to Muse)    Sedentary lifestyle    Iron deficiency anemia due to chronic blood loss    Stage 4 chronic kidney disease    Excessive daytime sleepiness    Family history of sleep apnea    At risk for cardiovascular event    Primary hypertension     - All medical issues reviewed, continue current Rx. decline OAC at triple risk for stroke with [id number] score of 3, also decline Holter.  - Warning signs of MI and stroke given, if symptoms last more than 5 minutes, stop immediately and call 911, then chew 2-4 low-dose ASA (81 mg).   - CV status and all medications reviewed, patient acknowledge good understanding.  - Recommend healthy living: healthy diet and regular exercise aiming for fitness, restorative sleep and weight control  - Need good exercise program, " 4 key elements: 1. Aerobic (walking, swimming, dancing, jogging, biking, etc, 2. Muscle strengthening / resistance exercise, need to do 2-3 times weekly, 3. Stretching daily, good stretch takes a whole  total minute. 4. Balance exercise daily.   - Encourage activities as much as tolerated. Any activity is better than none!   - Instruction for Mediterranean diet with protein supplement and heart healthy exercise given.  - Check home blood pressure, 2 days weekly, do 2 readings within 5 minutes in AM and PM, keep log for review. Target resting BP is less than 130/80.   - Highly recommend 30-60 minutes of exercise / activities daily, can have Sunday off, with 2-3 sessions of muscle strengthening weekly. A  would be very helpful.  - Recommend at least annual cardiovascular evaluation in view of patient's significant risk factors.      Total time spend including review of record prior to face-to-face visit is 30 minutes. Greater than 50% of the time was spent in counseling and coordination of care. The above assessment and plan have been discussed at length. Referring provider's note reviewed. Labs and procedure over the last 6 months reviewed. Problem List updated. Asked to bring in all active medications / pills bottles with next visit. Will send note to referring / PCP.

## 2024-06-05 LAB
OHS QRS DURATION: 78 MS
OHS QTC CALCULATION: 423 MS

## 2024-06-06 ENCOUNTER — OFFICE VISIT (OUTPATIENT)
Dept: UROLOGY | Facility: CLINIC | Age: 84
End: 2024-06-06
Payer: MEDICARE

## 2024-06-06 ENCOUNTER — TELEPHONE (OUTPATIENT)
Dept: UROLOGY | Facility: CLINIC | Age: 84
End: 2024-06-06

## 2024-06-06 DIAGNOSIS — N18.4 CKD (CHRONIC KIDNEY DISEASE) STAGE 4, GFR 15-29 ML/MIN: ICD-10-CM

## 2024-06-06 DIAGNOSIS — C64.2 RENAL CELL CARCINOMA OF LEFT KIDNEY: ICD-10-CM

## 2024-06-06 DIAGNOSIS — R33.9 URINARY RETENTION: Primary | ICD-10-CM

## 2024-06-06 PROCEDURE — 1160F RVW MEDS BY RX/DR IN RCRD: CPT | Mod: CPTII,95,, | Performed by: UROLOGY

## 2024-06-06 PROCEDURE — 1159F MED LIST DOCD IN RCRD: CPT | Mod: CPTII,95,, | Performed by: UROLOGY

## 2024-06-06 PROCEDURE — 99443 PR PHYSICIAN TELEPHONE EVALUATION 21-30 MIN: CPT | Mod: 95,,, | Performed by: UROLOGY

## 2024-06-06 NOTE — PROGRESS NOTES
Established Patient - Audio Only Telehealth Visit     The patient location is: Home  The chief complaint leading to consultation is: Urinary retention  Visit type: Virtual visit with audio only (telephone)  Total time spent with patient: 22 minutes       The reason for the audio only service rather than synchronous audio and video virtual visit was related to technical difficulties or patient preference/necessity.     Each patient to whom I provide medical services by telemedicine is:  (1) informed of the relationship between the physician and patient and the respective role of any other health care provider with respect to management of the patient; and (2) notified that they may decline to receive medical services by telemedicine and may withdraw from such care at any time. Patient verbally consented to receive this service via voice-only telephone call.       HPI:     Josh Pinto is a 84 y.o. male who presents for follow up for urinary retention.     Patient with a several year history of left renal RCC and recurrent urinary retention who presents to [facility].      He was previously managed by Dr. Escobar and Dr. Garcia. On review of records, he underwent robotic radical left nephrectomy on 8/20/23 with Dr. Garcia. 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       He reports having a catheter placed prior to his surgery that has remained in place. He was offered TURP, but states  he no longer wants to follow up with Dr. Garcia.      Patient also required a catheter in 2015 that was managed by Dr. Escobar. He was on Cardura 4 mg. Also has a history of elevated PSA. He is now only managed with Finasteride 5 mg PO daily. He has a prescription for Flomax, but has stopped due to hypotension.      He underwent cystoscopy and bladder fulguration on 8/19/23 with Dr. Garcia. 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      Renal ultrasound on 10/10/23 with a  Bosniak type 1 and type 2 right renal cyst and an enlarged prostate. Prostate volume 280 cc on US bladder on 9/25/23. CT abdomen pelvis without contrast on 9/17/23 with no recurrence of his renal cell carcinoma.          Interval History     12/15/23:Plan at initial visit was to continue Finasteride 5 mg and return for possible voiding trial on 1/27/23 with follow up with me in 3 months. He states that he has had issues with leaking around his catheter after each home health exchange. Referred here for evaluation. He has a 20 [place] in place. No recent infections.      5/16/24: He underwent CIC teaching on 1/12/24 in clinic. However, he developed some hematuria and decision was made to just replace an indwelling purdy. He has continued with purdy exchanges per home health. Not interested in surgical therapy at this time. He is on Flomax 0.4 mg and Finasteride 5 mg PO daily.     6/6/24: Virtual audio visit today. Purdy catheter removed at his last visit. States he has been voiding and his stream is improving. PVR 87 mL on 5/22/24. Not performing CIC as he doesn't feel it's necessary. Remains on Flomax 0.4 mg and Finasteride 5 mg PO daily. Creatinine has worsened and is now 2.8 on 5/20/24. States he no longer has a nephrologist. CT abdomen without contrast with suspected complicated right renal cyst not changed, enlarged prostate with bladder wall thickening. No hydronephrosis present.      Denies any fever, chills, gross hematuria, flank pain, bone pain, unintentional weight loss,  trauma or history of  malignancy.         IPSS    QoL  34        5          11/17/23    PVR  87 mL  5/22/24     PSA  3.5                   8/15/23     Urine culture  MRSA              3/20/24  MRSA              3/14/24  Pseudo 2/17/24       Assessment and plan:       Urinary retention  Enlarged prostate  CKD  History of renal cell carcinoma      - Extensive discussion with patient regarding the etiology and management of his lower  urinary tract symptoms. Visit today included increased complexity associated with the care of the episodic problem addressed and managing the longitudinal care of the patient due to the serious and/or complex managed problem(s) urinary retention, CKD, renal cell carcinoma.  Explained that LUTS are multifactorial and can be secondary to an enlarged prostate, PO intake of bladder irritants, overactive bladder, constipation, malignancy, trauma, infection, stones or medications. We discussed that there is a potential benefit to further evaluation with cystoscopy and TRUS. Remains uninterested in any surgical procedures on his prostate at this time.   - Continue Flomax 0.4 mg and Finasteride 5 mg PO daily.   - Repeat creatinine and renal ultrasound next available.   - Referral to Dr. Bills with nephrology.   - RTC as scheduled in 8/2024 with symptom score and PVR.          This service was not originating from a related E/M service provided within the previous 7 days nor will  to an E/M service or procedure within the next 24 hours or my soonest available appointment.  Prevailing standard of care was able to be met in this audio-only visit.

## 2024-06-08 ENCOUNTER — HOSPITAL ENCOUNTER (OUTPATIENT)
Dept: RADIOLOGY | Facility: HOSPITAL | Age: 84
Discharge: HOME | End: 2024-06-08
Attending: UROLOGY
Payer: MEDICARE

## 2024-06-08 DIAGNOSIS — R33.9 URINARY RETENTION: ICD-10-CM

## 2024-06-08 PROCEDURE — 76770 US EXAM ABDO BACK WALL COMP: CPT | Mod: TC

## 2024-06-08 PROCEDURE — 76770 US EXAM ABDO BACK WALL COMP: CPT | Mod: 26,,, | Performed by: RADIOLOGY

## 2024-06-11 ENCOUNTER — PATIENT MESSAGE (OUTPATIENT)
Dept: UROLOGY | Facility: CLINIC | Age: 84
End: 2024-06-11
Payer: MEDICARE

## 2024-06-19 ENCOUNTER — OFFICE VISIT (OUTPATIENT)
Dept: FAMILY MEDICINE | Facility: CLINIC | Age: 84
End: 2024-06-19
Payer: MEDICARE

## 2024-06-19 VITALS
SYSTOLIC BLOOD PRESSURE: 132 MMHG | BODY MASS INDEX: 22.02 KG/M2 | HEART RATE: 78 BPM | HEIGHT: 69 IN | DIASTOLIC BLOOD PRESSURE: 80 MMHG | OXYGEN SATURATION: 94 % | WEIGHT: 148.69 LBS

## 2024-06-19 DIAGNOSIS — M1A.09X0 CHRONIC GOUT OF MULTIPLE SITES, UNSPECIFIED CAUSE: Primary | ICD-10-CM

## 2024-06-19 DIAGNOSIS — N18.4 STAGE 4 CHRONIC KIDNEY DISEASE: ICD-10-CM

## 2024-06-19 PROCEDURE — 99999 PR PBB SHADOW E&M-EST. PATIENT-LVL III: CPT | Mod: PBBFAC,,, | Performed by: FAMILY MEDICINE

## 2024-06-19 PROCEDURE — 99204 OFFICE O/P NEW MOD 45 MIN: CPT | Mod: S$GLB,,, | Performed by: FAMILY MEDICINE

## 2024-06-19 PROCEDURE — 1126F AMNT PAIN NOTED NONE PRSNT: CPT | Mod: CPTII,S$GLB,, | Performed by: FAMILY MEDICINE

## 2024-06-19 PROCEDURE — 3288F FALL RISK ASSESSMENT DOCD: CPT | Mod: CPTII,S$GLB,, | Performed by: FAMILY MEDICINE

## 2024-06-19 PROCEDURE — 3079F DIAST BP 80-89 MM HG: CPT | Mod: CPTII,S$GLB,, | Performed by: FAMILY MEDICINE

## 2024-06-19 PROCEDURE — 1159F MED LIST DOCD IN RCRD: CPT | Mod: CPTII,S$GLB,, | Performed by: FAMILY MEDICINE

## 2024-06-19 PROCEDURE — 3075F SYST BP GE 130 - 139MM HG: CPT | Mod: CPTII,S$GLB,, | Performed by: FAMILY MEDICINE

## 2024-06-19 PROCEDURE — 1101F PT FALLS ASSESS-DOCD LE1/YR: CPT | Mod: CPTII,S$GLB,, | Performed by: FAMILY MEDICINE

## 2024-06-19 RX ORDER — COLCHICINE 0.6 MG/1
0.6 TABLET ORAL DAILY
COMMUNITY
End: 2024-06-19 | Stop reason: SDUPTHER

## 2024-06-19 RX ORDER — COLCHICINE 0.6 MG/1
TABLET ORAL
Qty: 30 TABLET | Refills: 5 | Status: SHIPPED | OUTPATIENT
Start: 2024-06-19

## 2024-06-19 RX ORDER — ALLOPURINOL 100 MG/1
TABLET ORAL
Qty: 90 TABLET | Refills: 0 | Status: SHIPPED | OUTPATIENT
Start: 2024-06-19

## 2024-06-19 RX ORDER — ACETAMINOPHEN 500 MG
5000 TABLET ORAL DAILY
COMMUNITY

## 2024-06-19 NOTE — PROGRESS NOTES
Subjective     Patient ID: Josh Pinto is a 84 y.o. male.    Chief Complaint: Establish Care    Establish care as his current PCP will be retiring at the end of the year    No significant issues or concerns today    Upon review of medications I see that his gout medications will  in July.  Patient states his gout is stable with allopurinol and colchicine p.r.n.    Establishing with nephrology today for CKD stage 4.  Follows with cardiology and urology as well.       Review of Systems   Constitutional:  Negative for activity change, appetite change, fatigue and fever.   Respiratory:  Negative for cough, chest tightness, shortness of breath and wheezing.    Cardiovascular:  Negative for chest pain, palpitations, leg swelling and claudication.   Gastrointestinal:  Negative for abdominal pain, constipation and diarrhea.   Psychiatric/Behavioral:  Negative for dysphoric mood, sleep disturbance and suicidal ideas. The patient is not nervous/anxious.           Objective     Physical Exam  Vitals reviewed.   Constitutional:       General: He is not in acute distress.     Appearance: Normal appearance. He is not ill-appearing.   Cardiovascular:      Rate and Rhythm: Normal rate and regular rhythm.      Heart sounds: Normal heart sounds.   Pulmonary:      Effort: Pulmonary effort is normal. No respiratory distress.      Breath sounds: Normal breath sounds.   Abdominal:      General: Bowel sounds are normal.      Palpations: Abdomen is soft.      Tenderness: There is no abdominal tenderness.   Musculoskeletal:      Cervical back: Neck supple.   Neurological:      General: No focal deficit present.      Mental Status: He is alert and oriented to person, place, and time.   Psychiatric:         Mood and Affect: Mood normal.         Behavior: Behavior normal.         Thought Content: Thought content normal.            Assessment and Plan     1. Chronic gout of multiple sites, unspecified cause    2. Stage 4 chronic kidney  disease    Other orders  -     allopurinoL (ZYLOPRIM) 100 MG tablet; Take a half a tablet every other day due to your chronic kidney disease  Dispense: 90 tablet; Refill: 0  -     colchicine (COLCRYS) 0.6 mg tablet; 1.2 mg at the first sign of  gout flare, followed in 1 hour with a single dose of 0.6 mg. repeat treatment should not occur for at least 14 days. Use with caution due to chronic kidney disease  Dispense: 30 tablet; Refill: 5      Chart reviewed.  Labs reviewed from 5-20-24.  Lipid reviewed from 12/14/2023.  We will renally adjust his gout medications and advised to use sparingly.  Advised that Nephrology may adjust his medications when he sees them today.    Continue to follow with the rest of his specialists  Risks, benefits, and side effects were discussed with the patient. All questions were answered to the fullest satisfaction of the patient, and pt verbalized understanding and agreement to treatment plan. Pt was to call with any new or worsening symptoms, or present to the ER.  RTC prdagoberto Burrell MD  Family Medicine Physician   [facility]     This note was created using M*Modal voice recognition software that occasionally may misinterpret phrases or words.

## 2024-07-03 ENCOUNTER — LAB VISIT (OUTPATIENT)
Dept: LAB | Facility: HOSPITAL | Age: 84
End: 2024-07-03
Attending: INTERNAL MEDICINE
Payer: MEDICARE

## 2024-07-03 DIAGNOSIS — N17.9 ACUTE RENAL FAILURE, UNSPECIFIED ACUTE RENAL FAILURE TYPE: Primary | ICD-10-CM

## 2024-07-03 DIAGNOSIS — E83.42 HYPOMAGNESEMIA: ICD-10-CM

## 2024-07-03 DIAGNOSIS — N25.81 SECONDARY HYPERPARATHYROIDISM OF RENAL ORIGIN: ICD-10-CM

## 2024-07-03 LAB
ALBUMIN SERPL BCP-MCNC: 3.5 G/DL (ref 3.5–5.2)
ANION GAP SERPL CALC-SCNC: 8 MMOL/L (ref 8–16)
BACTERIA #/AREA URNS HPF: ABNORMAL /HPF
BASOPHILS # BLD AUTO: 0.06 K/UL (ref 0–0.2)
BASOPHILS NFR BLD: 0.8 % (ref 0–1.9)
BILIRUB UR QL STRIP: NEGATIVE
BUN SERPL-MCNC: 56 MG/DL (ref 8–23)
CALCIUM SERPL-MCNC: 10 MG/DL (ref 8.7–10.5)
CHLORIDE SERPL-SCNC: 107 MMOL/L (ref 95–110)
CLARITY UR: ABNORMAL
CO2 SERPL-SCNC: 24 MMOL/L (ref 23–29)
COLOR UR: YELLOW
CREAT SERPL-MCNC: 3.2 MG/DL (ref 0.5–1.4)
CREAT UR-MCNC: 62.6 MG/DL (ref 23–375)
DIFFERENTIAL METHOD BLD: ABNORMAL
EOSINOPHIL # BLD AUTO: 0.4 K/UL (ref 0–0.5)
EOSINOPHIL NFR BLD: 5.4 % (ref 0–8)
ERYTHROCYTE [DISTWIDTH] IN BLOOD BY AUTOMATED COUNT: 14.5 % (ref 11.5–14.5)
EST. GFR  (NO RACE VARIABLE): 18.6 ML/MIN/1.73 M^2
FERRITIN SERPL-MCNC: 339 NG/ML (ref 20–300)
GLUCOSE SERPL-MCNC: 81 MG/DL (ref 70–110)
GLUCOSE UR QL STRIP: NEGATIVE
HCT VFR BLD AUTO: 43.2 % (ref 40–54)
HGB BLD-MCNC: 13.9 G/DL (ref 14–18)
HGB UR QL STRIP: ABNORMAL
IMM GRANULOCYTES # BLD AUTO: 0.02 K/UL (ref 0–0.04)
IMM GRANULOCYTES NFR BLD AUTO: 0.3 % (ref 0–0.5)
IRON SERPL-MCNC: 99 UG/DL (ref 45–160)
KETONES UR QL STRIP: NEGATIVE
LEUKOCYTE ESTERASE UR QL STRIP: ABNORMAL
LYMPHOCYTES # BLD AUTO: 1.3 K/UL (ref 1–4.8)
LYMPHOCYTES NFR BLD: 17.9 % (ref 18–48)
MAGNESIUM SERPL-MCNC: 1.7 MG/DL (ref 1.6–2.6)
MCH RBC QN AUTO: 31.6 PG (ref 27–31)
MCHC RBC AUTO-ENTMCNC: 32.2 G/DL (ref 32–36)
MCV RBC AUTO: 98 FL (ref 82–98)
MICROSCOPIC COMMENT: ABNORMAL
MONOCYTES # BLD AUTO: 0.9 K/UL (ref 0.3–1)
MONOCYTES NFR BLD: 12 % (ref 4–15)
NEUTROPHILS # BLD AUTO: 4.6 K/UL (ref 1.8–7.7)
NEUTROPHILS NFR BLD: 63.6 % (ref 38–73)
NITRITE UR QL STRIP: NEGATIVE
NRBC BLD-RTO: 0 /100 WBC
PH UR STRIP: 8 [PH] (ref 5–8)
PHOSPHATE SERPL-MCNC: 3.1 MG/DL (ref 2.7–4.5)
PLATELET # BLD AUTO: 191 K/UL (ref 150–450)
PMV BLD AUTO: 11.6 FL (ref 9.2–12.9)
POTASSIUM SERPL-SCNC: 5.6 MMOL/L (ref 3.5–5.1)
PROT UR QL STRIP: ABNORMAL
PROT UR-MCNC: 24 MG/DL (ref 0–15)
PROT/CREAT UR: 0.38 MG/G{CREAT} (ref 0–0.2)
PTH-INTACT SERPL-MCNC: 52.8 PG/ML (ref 9–77)
RBC # BLD AUTO: 4.4 M/UL (ref 4.6–6.2)
RBC #/AREA URNS HPF: 3 /HPF (ref 0–4)
SATURATED IRON: 34 % (ref 20–50)
SODIUM SERPL-SCNC: 139 MMOL/L (ref 136–145)
SP GR UR STRIP: 1.02 (ref 1–1.03)
TOTAL IRON BINDING CAPACITY: 293 UG/DL (ref 250–450)
TRANSFERRIN SERPL-MCNC: 198 MG/DL (ref 200–375)
URATE SERPL-MCNC: 6.8 MG/DL (ref 3.4–7)
URN SPEC COLLECT METH UR: ABNORMAL
UROBILINOGEN UR STRIP-ACNC: NEGATIVE EU/DL
WBC # BLD AUTO: 7.26 K/UL (ref 3.9–12.7)
WBC #/AREA URNS HPF: >100 /HPF (ref 0–5)

## 2024-07-03 PROCEDURE — 85025 COMPLETE CBC W/AUTO DIFF WBC: CPT | Performed by: INTERNAL MEDICINE

## 2024-07-03 PROCEDURE — 83970 ASSAY OF PARATHORMONE: CPT | Performed by: INTERNAL MEDICINE

## 2024-07-03 PROCEDURE — 82728 ASSAY OF FERRITIN: CPT | Mod: GA | Performed by: INTERNAL MEDICINE

## 2024-07-03 PROCEDURE — 84156 ASSAY OF PROTEIN URINE: CPT | Performed by: INTERNAL MEDICINE

## 2024-07-03 PROCEDURE — 82306 VITAMIN D 25 HYDROXY: CPT | Performed by: INTERNAL MEDICINE

## 2024-07-03 PROCEDURE — 36415 COLL VENOUS BLD VENIPUNCTURE: CPT | Performed by: INTERNAL MEDICINE

## 2024-07-03 PROCEDURE — 84550 ASSAY OF BLOOD/URIC ACID: CPT | Performed by: INTERNAL MEDICINE

## 2024-07-03 PROCEDURE — 83540 ASSAY OF IRON: CPT | Mod: GA | Performed by: INTERNAL MEDICINE

## 2024-07-03 PROCEDURE — 81000 URINALYSIS NONAUTO W/SCOPE: CPT | Performed by: INTERNAL MEDICINE

## 2024-07-03 PROCEDURE — 83735 ASSAY OF MAGNESIUM: CPT | Performed by: INTERNAL MEDICINE

## 2024-07-03 PROCEDURE — 80069 RENAL FUNCTION PANEL: CPT | Performed by: INTERNAL MEDICINE

## 2024-07-03 PROCEDURE — 82570 ASSAY OF URINE CREATININE: CPT | Performed by: INTERNAL MEDICINE

## 2024-07-08 LAB — 25(OH)D3+25(OH)D2 SERPL-MCNC: 59 NG/ML (ref 30–96)

## 2024-07-11 ENCOUNTER — LAB VISIT (OUTPATIENT)
Dept: LAB | Facility: HOSPITAL | Age: 84
End: 2024-07-11
Attending: INTERNAL MEDICINE
Payer: MEDICARE

## 2024-07-11 DIAGNOSIS — N18.30 STAGE 3 CHRONIC KIDNEY DISEASE, UNSPECIFIED WHETHER STAGE 3A OR 3B CKD: Primary | ICD-10-CM

## 2024-07-11 DIAGNOSIS — N39.0 URINARY TRACT INFECTION WITHOUT HEMATURIA, SITE UNSPECIFIED: ICD-10-CM

## 2024-07-11 LAB
ALBUMIN SERPL BCP-MCNC: 3.2 G/DL (ref 3.5–5.2)
ANION GAP SERPL CALC-SCNC: 9 MMOL/L (ref 8–16)
BACTERIA #/AREA URNS HPF: ABNORMAL /HPF
BILIRUB UR QL STRIP: NEGATIVE
BUN SERPL-MCNC: 51 MG/DL (ref 8–23)
CALCIUM SERPL-MCNC: 9.1 MG/DL (ref 8.7–10.5)
CHLORIDE SERPL-SCNC: 109 MMOL/L (ref 95–110)
CLARITY UR: ABNORMAL
CO2 SERPL-SCNC: 22 MMOL/L (ref 23–29)
COLOR UR: YELLOW
CREAT SERPL-MCNC: 2.9 MG/DL (ref 0.5–1.4)
EST. GFR  (NO RACE VARIABLE): 20.9 ML/MIN/1.73 M^2
GLUCOSE SERPL-MCNC: 101 MG/DL (ref 70–110)
GLUCOSE UR QL STRIP: NEGATIVE
HGB UR QL STRIP: ABNORMAL
HYALINE CASTS #/AREA URNS LPF: 0 /LPF
KETONES UR QL STRIP: NEGATIVE
LEUKOCYTE ESTERASE UR QL STRIP: ABNORMAL
MICROSCOPIC COMMENT: ABNORMAL
NITRITE UR QL STRIP: NEGATIVE
PH UR STRIP: 7 [PH] (ref 5–8)
PHOSPHATE SERPL-MCNC: 3.2 MG/DL (ref 2.7–4.5)
POTASSIUM SERPL-SCNC: 4.4 MMOL/L (ref 3.5–5.1)
PROT UR QL STRIP: ABNORMAL
RBC #/AREA URNS HPF: 3 /HPF (ref 0–4)
SODIUM SERPL-SCNC: 140 MMOL/L (ref 136–145)
SP GR UR STRIP: 1.01 (ref 1–1.03)
URN SPEC COLLECT METH UR: ABNORMAL
UROBILINOGEN UR STRIP-ACNC: NEGATIVE EU/DL
WBC #/AREA URNS HPF: >100 /HPF (ref 0–5)

## 2024-07-11 PROCEDURE — 80069 RENAL FUNCTION PANEL: CPT | Performed by: INTERNAL MEDICINE

## 2024-07-11 PROCEDURE — 87186 SC STD MICRODIL/AGAR DIL: CPT | Performed by: INTERNAL MEDICINE

## 2024-07-11 PROCEDURE — 81000 URINALYSIS NONAUTO W/SCOPE: CPT | Performed by: INTERNAL MEDICINE

## 2024-07-11 PROCEDURE — 36415 COLL VENOUS BLD VENIPUNCTURE: CPT | Performed by: INTERNAL MEDICINE

## 2024-07-11 PROCEDURE — 87086 URINE CULTURE/COLONY COUNT: CPT | Performed by: INTERNAL MEDICINE

## 2024-07-11 PROCEDURE — 87088 URINE BACTERIA CULTURE: CPT | Performed by: INTERNAL MEDICINE

## 2024-07-15 LAB — BACTERIA UR CULT: ABNORMAL

## 2024-07-29 NOTE — DISCHARGE INSTRUCTIONS
Rest, increase fluids, lots of water and liquids.  Follow up with your urologist as planned for this coming week.  Return as needed   Statement Selected

## 2024-07-30 ENCOUNTER — LAB VISIT (OUTPATIENT)
Dept: LAB | Facility: HOSPITAL | Age: 84
End: 2024-07-30
Attending: INTERNAL MEDICINE
Payer: MEDICARE

## 2024-07-30 DIAGNOSIS — N18.30 STAGE 3 CHRONIC KIDNEY DISEASE, UNSPECIFIED WHETHER STAGE 3A OR 3B CKD: ICD-10-CM

## 2024-07-30 DIAGNOSIS — N17.9 ACUTE RENAL FAILURE, UNSPECIFIED ACUTE RENAL FAILURE TYPE: Primary | ICD-10-CM

## 2024-07-30 DIAGNOSIS — E83.42 HYPOMAGNESEMIA: ICD-10-CM

## 2024-07-30 DIAGNOSIS — N17.9 ACUTE RENAL FAILURE, UNSPECIFIED ACUTE RENAL FAILURE TYPE: ICD-10-CM

## 2024-07-30 LAB
ALBUMIN SERPL BCP-MCNC: 3.3 G/DL (ref 3.5–5.2)
ALBUMIN/CREAT UR: 75.6 UG/MG (ref 0–30)
ANION GAP SERPL CALC-SCNC: 8 MMOL/L (ref 8–16)
BILIRUB UR QL STRIP: NEGATIVE
BUN SERPL-MCNC: 53 MG/DL (ref 8–23)
CALCIUM SERPL-MCNC: 9.5 MG/DL (ref 8.7–10.5)
CHLORIDE SERPL-SCNC: 107 MMOL/L (ref 95–110)
CLARITY UR: CLEAR
CO2 SERPL-SCNC: 24 MMOL/L (ref 23–29)
COLOR UR: YELLOW
CREAT SERPL-MCNC: 3 MG/DL (ref 0.5–1.4)
CREAT UR-MCNC: 86 MG/DL (ref 23–375)
EST. GFR  (NO RACE VARIABLE): 20.1 ML/MIN/1.73 M^2
GLUCOSE SERPL-MCNC: 122 MG/DL (ref 70–110)
GLUCOSE UR QL STRIP: NEGATIVE
HGB UR QL STRIP: ABNORMAL
KETONES UR QL STRIP: NEGATIVE
LEUKOCYTE ESTERASE UR QL STRIP: ABNORMAL
MAGNESIUM SERPL-MCNC: 1.8 MG/DL (ref 1.6–2.6)
MICROALBUMIN UR DL<=1MG/L-MCNC: 65 UG/ML
MICROSCOPIC COMMENT: NORMAL
NITRITE UR QL STRIP: NEGATIVE
PH UR STRIP: 6 [PH] (ref 5–8)
PHOSPHATE SERPL-MCNC: 3.7 MG/DL (ref 2.7–4.5)
POTASSIUM SERPL-SCNC: 4.8 MMOL/L (ref 3.5–5.1)
PROT UR QL STRIP: NEGATIVE
RBC #/AREA URNS HPF: 1 /HPF (ref 0–4)
SODIUM SERPL-SCNC: 139 MMOL/L (ref 136–145)
SP GR UR STRIP: 1.01 (ref 1–1.03)
SQUAMOUS #/AREA URNS HPF: 2 /HPF
URN SPEC COLLECT METH UR: ABNORMAL
UROBILINOGEN UR STRIP-ACNC: NEGATIVE EU/DL
WBC #/AREA URNS HPF: 3 /HPF (ref 0–5)

## 2024-07-30 PROCEDURE — 81000 URINALYSIS NONAUTO W/SCOPE: CPT | Performed by: INTERNAL MEDICINE

## 2024-07-30 PROCEDURE — 82570 ASSAY OF URINE CREATININE: CPT | Performed by: INTERNAL MEDICINE

## 2024-07-30 PROCEDURE — 80069 RENAL FUNCTION PANEL: CPT | Performed by: INTERNAL MEDICINE

## 2024-07-30 PROCEDURE — 36415 COLL VENOUS BLD VENIPUNCTURE: CPT | Performed by: INTERNAL MEDICINE

## 2024-07-30 PROCEDURE — 82043 UR ALBUMIN QUANTITATIVE: CPT | Performed by: INTERNAL MEDICINE

## 2024-07-30 PROCEDURE — 83735 ASSAY OF MAGNESIUM: CPT | Performed by: INTERNAL MEDICINE

## 2024-08-15 ENCOUNTER — OFFICE VISIT (OUTPATIENT)
Dept: UROLOGY | Facility: CLINIC | Age: 84
End: 2024-08-15
Payer: MEDICARE

## 2024-08-15 DIAGNOSIS — R33.9 URINARY RETENTION: Primary | ICD-10-CM

## 2024-08-15 LAB — POC RESIDUAL URINE VOLUME: 104 ML (ref 0–100)

## 2024-08-15 PROCEDURE — 1160F RVW MEDS BY RX/DR IN RCRD: CPT | Mod: CPTII,S$GLB,, | Performed by: NURSE PRACTITIONER

## 2024-08-15 PROCEDURE — 99999 PR PBB SHADOW E&M-EST. PATIENT-LVL III: CPT | Mod: PBBFAC,,, | Performed by: NURSE PRACTITIONER

## 2024-08-15 PROCEDURE — 3288F FALL RISK ASSESSMENT DOCD: CPT | Mod: CPTII,S$GLB,, | Performed by: NURSE PRACTITIONER

## 2024-08-15 PROCEDURE — 1101F PT FALLS ASSESS-DOCD LE1/YR: CPT | Mod: CPTII,S$GLB,, | Performed by: NURSE PRACTITIONER

## 2024-08-15 PROCEDURE — 1159F MED LIST DOCD IN RCRD: CPT | Mod: CPTII,S$GLB,, | Performed by: NURSE PRACTITIONER

## 2024-08-15 PROCEDURE — 51798 US URINE CAPACITY MEASURE: CPT | Mod: S$GLB,,, | Performed by: NURSE PRACTITIONER

## 2024-08-15 PROCEDURE — 99213 OFFICE O/P EST LOW 20 MIN: CPT | Mod: S$GLB,,, | Performed by: NURSE PRACTITIONER

## 2024-08-15 PROCEDURE — G2211 COMPLEX E/M VISIT ADD ON: HCPCS | Mod: S$GLB,,, | Performed by: NURSE PRACTITIONER

## 2024-08-15 RX ORDER — FINASTERIDE 5 MG/1
5 TABLET, FILM COATED ORAL DAILY
Qty: 90 TABLET | Refills: 3 | Status: SHIPPED | OUTPATIENT
Start: 2024-08-15 | End: 2024-11-13

## 2024-08-15 RX ORDER — TAMSULOSIN HYDROCHLORIDE 0.4 MG/1
0.4 CAPSULE ORAL DAILY
Qty: 90 CAPSULE | Refills: 2 | Status: SHIPPED | OUTPATIENT
Start: 2024-08-15 | End: 2024-11-13

## 2024-08-15 NOTE — PATIENT INSTRUCTIONS
""START TIMED VOIDING/BLADDER TRAINING" ASAP!!:  WHETHER YOU FEEL AN URGE TO URINATE OR NOT, YOU SHOULD BE FREQUENTING THE TOILET AND ATTEMPT TO URINATE EVERY 3 HOURS!!  NEVER POSTPONE URINATING OR HOLD YOUR URINE.    MAKE SURE YOU ARE HAVING REGULAR/NORMAL BOWEL MOVEMENTS AS THIS ALSO WILL HAVE AN EFFECT ON HOW YOUR BLADDER FUNCTIONS.    NOTHING TO EAT OR DRINK BY MOUTH (THIS INCLUDES WATER) AT LEAST 1-2 HOURS PRIOR TO YOUR BEDTIME ROUTINE.    "

## 2024-08-15 NOTE — PROGRESS NOTES
[facility] Note  Staff: SANTI Whitlock    PCP: JA Burrell    Chief Complaint: F/UP    Subjective:        HPI: Josh Pinto is a 84 y.o. male presents today for routine recheck.  Hx of Urinary Retention    Pt is established with Dr. Maura Lowery.  Last visit was an Audio Visit with MD on 6/6/2024.    Josh Pinto is a 84 y.o. male who presents for follow up for urinary retention.     Patient with a several year history of left renal RCC and recurrent urinary retention who presents to establish care.      He was previously managed by Dr. Escobar and Dr. Garcia. On review of records, he underwent robotic radical left nephrectomy on 8/20/23 with Dr. Garcia. 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       He reports having a catheter placed prior to his surgery that has remained in place. He was offered TURP, but states  he no longer wants to follow up with Dr. Garcia.      Patient also required a catheter in 2015 that was managed by Dr. Escobar. He was on Cardura 4 mg. Also has a history of elevated PSA. He is now only managed with Finasteride 5 mg PO daily. He has a prescription for Flomax, but has stopped due to hypotension.      He underwent cystoscopy and bladder fulguration on 8/19/23 with Dr. Garcia. 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 Prostate volume 280 cc on US bladder on 9/25/23. CT abdomen pelvis without contrast on 9/17/23 with no recurrence of his renal cell carcinoma.          Interval History     12/15/23:Plan at initial visit was to continue Finasteride 5 mg and return for possible voiding trial on 1/27/23 with follow up with me in 3 months. He states that he has had issues with leaking around his catheter after each home health exchange. Referred here for evaluation. He has a 20 [place] in place. No  recent infections.      5/16/24: He underwent CIC teaching on 1/12/24 in clinic. However, he developed some hematuria and decision was made to just replace an indwelling purdy. He has continued with purdy exchanges per home health. Not interested in surgical therapy at this time. He is on Flomax 0.4 mg and Finasteride 5 mg PO daily.      6/6/24: Virtual audio visit today. Purdy catheter removed at his last visit. States he has been voiding and his stream is improving. PVR 87 mL on 5/22/24. Not performing CIC as he doesn't feel it's necessary. Remains on Flomax 0.4 mg and Finasteride 5 mg PO daily. Creatinine has worsened and is now 2.8 on 5/20/24. States he no longer has a nephrologist. CT abdomen without contrast with suspected complicated right renal cyst not changed, enlarged prostate with bladder wall thickening. No hydronephrosis present.     8/15/24:  Upon arrival we had pt empty his bladder.  PVR is 104 mL  Current meds: Finasteride 5 mg daily and Flomax 0.4 mg daily at this time.  AUA SS: 3-nocturia,frequency, intermittency; 2-weak stream; 1-urgency.  12/3 mixed  Denies any fever, chills, gross hematuria, flank pain, bone pain, unintentional weight loss,  trauma or history of  malignancy.      PVR  104 mL 8/15/24  87 mL              5/22/24     PSA  3.5                   8/15/23     Urine culture  MRSA              3/20/24  MRSA              3/14/24  Pseudo 2/17/24     REVIEW OF SYSTEMS:  A comprehensive 10 system review was performed and is negative except as noted above in HPI    PMHx:  Past Medical History:   Diagnosis Date    Allergy     Arthritis     Cataract     Hypertension      PSHx:  Past Surgical History:   Procedure Laterality Date    BLADDER FULGURATION N/A 8/19/2023    Procedure: FULGURATION, BLADDER;  Surgeon: Abdulaziz Garcia MD;  Location: [facility];  Service: Urology;  Laterality: N/A;    CYSTOSCOPY N/A 8/19/2023    Procedure: CYSTOSCOPY;  Surgeon: Abdulaziz Garcia MD;  Location:  [facility];  Service: Urology;  Laterality: N/A;    ESOPHAGOGASTRODUODENOSCOPY N/A 9/20/2023    Procedure: EGD (ESOPHAGOGASTRODUODENOSCOPY);  Surgeon: Varinder Melo MD;  Location: [facility];  Service: Gastroenterology;  Laterality: N/A;    HERNIA REPAIR N/A 9/3/2023    Procedure: REPAIR, HERNIA INTERNAL;  Surgeon: Andrew Juares MD;  Location: [facility];  Service: General;  Laterality: N/A;    INSERTION OF CATHETER N/A 8/19/2023    Procedure: INSERTION, CATHETER;  Surgeon: Abdulaziz Garcia MD;  Location: [facility];  Service: Urology;  Laterality: N/A;    knee surgery      right knee    LAPAROSCOPIC ROBOT-ASSISTED SURGICAL REMOVAL OF KIDNEY USING DA EVIN XI Left 8/20/2023    Procedure: XI ROBOTIC NEPHRECTOMY;  Surgeon: Abdulaziz Garcia MD;  Location: [facility];  Service: Urology;  Laterality: Left;    LAPAROTOMY, EXPLORATORY N/A 9/3/2023    Procedure: LAPAROTOMY, EXPLORATORY;  Surgeon: Andrew Juares MD;  Location: [facility];  Service: General;  Laterality: N/A;       Allergies:  Sulfa (sulfonamide antibiotics)    Medications: reviewed     Objective:   There were no vitals filed for this visit.    General:WDWN in NAD  Eyes: PERRLA, normal conjunctiva  Respiratory: no increased work on breathing, clear to auscultation  Cardiovascular: regular rate and rhythm. No obvious extremity edema.  GI: palpation of masses. No tenderness. No hepatosplenomegaly to palpation.  Musculoskeletal: normal range of motion of bilateral upper extremities. Normal muscle strength and tone.  Skin: no obvious rashes or lesions. No tightening of skin noted.  Neurologic: CN grossly normal. Normal sensation.   Psychiatric: awake, alert and oriented x 3. Mood and affect normal. Cooperative.        Assessment:       1. Urinary retention          Plan:     Continue Finasteride 5 mg one tablet daily  Suggested to pt to increase Flomax to 0.8 mg daily at this time.  Benefits, risks and side affects were thoroughly explained to pt today in  "office with all questions answered.      "START TIMED VOIDING/BLADDER TRAINING" ASAP!!:  WHETHER YOU FEEL AN URGE TO URINATE OR NOT, YOU SHOULD BE FREQUENTING THE TOILET AND ATTEMPT TO URINATE EVERY 3 HOURS!!  NEVER POSTPONE URINATING OR HOLD YOUR URINE.    MAKE SURE YOU ARE HAVING REGULAR/NORMAL BOWEL MOVEMENTS AS THIS ALSO WILL HAVE AN EFFECT ON HOW YOUR BLADDER FUNCTIONS.    NOTHING TO EAT OR DRINK BY MOUTH (THIS INCLUDES WATER) AT LEAST 1-2 HOURS PRIOR TO YOUR BEDTIME ROUTINE.      F/u with Dr. Lowery in 6 months with PVR scan.  Pt verbalized understanding on conclusion of OV today.      Thais Grijalva, FNP-C  Visit today is associated with current or anticipated ongoing medical care related to this patient's single serious condition/complex condition.      "

## 2024-08-28 ENCOUNTER — HOSPITAL ENCOUNTER (INPATIENT)
Facility: HOSPITAL | Age: 84
LOS: 15 days | Discharge: HOME HEALTH | DRG: 417 | End: 2024-09-12
Attending: INTERNAL MEDICINE | Admitting: INTERNAL MEDICINE
Payer: MEDICARE

## 2024-08-28 ENCOUNTER — HOSPITAL ENCOUNTER (EMERGENCY)
Facility: HOSPITAL | Age: 84
Discharge: ANOTHER HOSPITAL | End: 2024-08-28
Attending: EMERGENCY MEDICINE
Payer: MEDICARE

## 2024-08-28 VITALS
TEMPERATURE: 98 F | HEART RATE: 57 BPM | SYSTOLIC BLOOD PRESSURE: 119 MMHG | DIASTOLIC BLOOD PRESSURE: 92 MMHG | BODY MASS INDEX: 22.22 KG/M2 | HEIGHT: 69 IN | OXYGEN SATURATION: 94 % | RESPIRATION RATE: 21 BRPM | WEIGHT: 150 LBS

## 2024-08-28 DIAGNOSIS — K81.0 ACUTE CHOLECYSTITIS: ICD-10-CM

## 2024-08-28 DIAGNOSIS — R06.02 SOB (SHORTNESS OF BREATH): ICD-10-CM

## 2024-08-28 DIAGNOSIS — K83.1 BILIARY OBSTRUCTION: Primary | ICD-10-CM

## 2024-08-28 DIAGNOSIS — A41.9 SEVERE SEPSIS: ICD-10-CM

## 2024-08-28 DIAGNOSIS — K85.10 ACUTE BILIARY PANCREATITIS, UNSPECIFIED COMPLICATION STATUS: ICD-10-CM

## 2024-08-28 DIAGNOSIS — I48.91 A-FIB: ICD-10-CM

## 2024-08-28 DIAGNOSIS — K83.09 CHOLANGITIS: ICD-10-CM

## 2024-08-28 DIAGNOSIS — R65.20 SEVERE SEPSIS: ICD-10-CM

## 2024-08-28 DIAGNOSIS — J96.01 ACUTE HYPOXIC RESPIRATORY FAILURE: ICD-10-CM

## 2024-08-28 LAB
ALBUMIN SERPL BCP-MCNC: 3.3 G/DL (ref 3.5–5.2)
ALLENS TEST: ABNORMAL
ALP SERPL-CCNC: 420 U/L (ref 55–135)
ALT SERPL W/O P-5'-P-CCNC: 366 U/L (ref 10–44)
ANION GAP SERPL CALC-SCNC: 15 MMOL/L (ref 8–16)
ANION GAP SERPL CALC-SCNC: 16 MMOL/L (ref 8–16)
AST SERPL-CCNC: 268 U/L (ref 10–40)
BACTERIA #/AREA URNS HPF: ABNORMAL /HPF
BASOPHILS # BLD AUTO: 0 K/UL (ref 0–0.2)
BASOPHILS NFR BLD: 0 % (ref 0–1.9)
BILIRUB SERPL-MCNC: 7.3 MG/DL (ref 0.1–1)
BILIRUB UR QL STRIP: ABNORMAL
BUN SERPL-MCNC: 44 MG/DL (ref 8–23)
BUN SERPL-MCNC: 48 MG/DL (ref 8–23)
CALCIUM SERPL-MCNC: 8.5 MG/DL (ref 8.7–10.5)
CALCIUM SERPL-MCNC: 9.6 MG/DL (ref 8.7–10.5)
CHLORIDE SERPL-SCNC: 106 MMOL/L (ref 95–110)
CHLORIDE SERPL-SCNC: 111 MMOL/L (ref 95–110)
CLARITY UR: CLEAR
CO2 SERPL-SCNC: 16 MMOL/L (ref 23–29)
CO2 SERPL-SCNC: 18 MMOL/L (ref 23–29)
COLOR UR: ABNORMAL
CREAT SERPL-MCNC: 3 MG/DL (ref 0.5–1.4)
CREAT SERPL-MCNC: 3.1 MG/DL (ref 0.5–1.4)
DELSYS: ABNORMAL
DIFFERENTIAL METHOD BLD: ABNORMAL
EOSINOPHIL # BLD AUTO: 0 K/UL (ref 0–0.5)
EOSINOPHIL NFR BLD: 0 % (ref 0–8)
ERYTHROCYTE [DISTWIDTH] IN BLOOD BY AUTOMATED COUNT: 14.6 % (ref 11.5–14.5)
EST. GFR  (NO RACE VARIABLE): 19.3 ML/MIN/1.73 M^2
EST. GFR  (NO RACE VARIABLE): 20.1 ML/MIN/1.73 M^2
FIO2: 36
FLOW: 4
GLUCOSE SERPL-MCNC: 135 MG/DL (ref 70–110)
GLUCOSE SERPL-MCNC: 141 MG/DL (ref 70–110)
GLUCOSE UR QL STRIP: NEGATIVE
HCO3 UR-SCNC: 16.9 MMOL/L (ref 24–28)
HCT VFR BLD AUTO: 42.9 % (ref 40–54)
HCV AB SERPL QL IA: NORMAL
HGB BLD-MCNC: 14.2 G/DL (ref 14–18)
HGB UR QL STRIP: ABNORMAL
HIV 1+2 AB+HIV1 P24 AG SERPL QL IA: NORMAL
HYALINE CASTS #/AREA URNS LPF: 0 /LPF
IMM GRANULOCYTES # BLD AUTO: 0.04 K/UL (ref 0–0.04)
IMM GRANULOCYTES NFR BLD AUTO: 0.6 % (ref 0–0.5)
INR PPP: 1.1 (ref 0.8–1.2)
KETONES UR QL STRIP: ABNORMAL
LACTATE SERPL-SCNC: 3.4 MMOL/L (ref 0.5–2.2)
LACTATE SERPL-SCNC: 4.7 MMOL/L (ref 0.5–2.2)
LACTATE SERPL-SCNC: 9.9 MMOL/L (ref 0.5–2.2)
LEUKOCYTE ESTERASE UR QL STRIP: NEGATIVE
LIPASE SERPL-CCNC: 812 U/L (ref 4–60)
LYMPHOCYTES # BLD AUTO: 0.1 K/UL (ref 1–4.8)
LYMPHOCYTES NFR BLD: 1.1 % (ref 18–48)
MCH RBC QN AUTO: 30.7 PG (ref 27–31)
MCHC RBC AUTO-ENTMCNC: 33.1 G/DL (ref 32–36)
MCV RBC AUTO: 93 FL (ref 82–98)
MICROSCOPIC COMMENT: ABNORMAL
MODE: ABNORMAL
MONOCYTES # BLD AUTO: 0.2 K/UL (ref 0.3–1)
MONOCYTES NFR BLD: 2.3 % (ref 4–15)
NEUTROPHILS # BLD AUTO: 6.7 K/UL (ref 1.8–7.7)
NEUTROPHILS NFR BLD: 96 % (ref 38–73)
NITRITE UR QL STRIP: NEGATIVE
NRBC BLD-RTO: 0 /100 WBC
OHS QRS DURATION: 66 MS
OHS QTC CALCULATION: 462 MS
PCO2 BLDA: 23.3 MMHG (ref 35–45)
PH SMN: 7.47 [PH] (ref 7.35–7.45)
PH UR STRIP: 6 [PH] (ref 5–8)
PLATELET # BLD AUTO: 151 K/UL (ref 150–450)
PMV BLD AUTO: 11 FL (ref 9.2–12.9)
PO2 BLDA: 61 MMHG (ref 80–100)
POC BE: -7 MMOL/L
POC SATURATED O2: 93 % (ref 95–100)
POC TCO2: 18 MMOL/L (ref 23–27)
POTASSIUM SERPL-SCNC: 4.2 MMOL/L (ref 3.5–5.1)
POTASSIUM SERPL-SCNC: 4.5 MMOL/L (ref 3.5–5.1)
PROT SERPL-MCNC: 7.2 G/DL (ref 6–8.4)
PROT UR QL STRIP: ABNORMAL
PROTHROMBIN TIME: 11.4 SEC (ref 9–12.5)
RBC # BLD AUTO: 4.62 M/UL (ref 4.6–6.2)
RBC #/AREA URNS HPF: 6 /HPF (ref 0–4)
SAMPLE: ABNORMAL
SARS-COV-2 RDRP RESP QL NAA+PROBE: NEGATIVE
SITE: ABNORMAL
SODIUM SERPL-SCNC: 139 MMOL/L (ref 136–145)
SODIUM SERPL-SCNC: 143 MMOL/L (ref 136–145)
SP GR UR STRIP: 1.01 (ref 1–1.03)
SP02: 94
SQUAMOUS #/AREA URNS HPF: 2 /HPF
TROPONIN I SERPL DL<=0.01 NG/ML-MCNC: 0.21 NG/ML (ref 0–0.03)
URN SPEC COLLECT METH UR: ABNORMAL
UROBILINOGEN UR STRIP-ACNC: 1 EU/DL
WBC # BLD AUTO: 6.97 K/UL (ref 3.9–12.7)
WBC #/AREA URNS HPF: 3 /HPF (ref 0–5)

## 2024-08-28 PROCEDURE — 74176 CT ABD & PELVIS W/O CONTRAST: CPT | Mod: 26,,, | Performed by: RADIOLOGY

## 2024-08-28 PROCEDURE — 80053 COMPREHEN METABOLIC PANEL: CPT | Performed by: EMERGENCY MEDICINE

## 2024-08-28 PROCEDURE — 36600 WITHDRAWAL OF ARTERIAL BLOOD: CPT

## 2024-08-28 PROCEDURE — 99285 EMERGENCY DEPT VISIT HI MDM: CPT | Mod: 25

## 2024-08-28 PROCEDURE — 85025 COMPLETE CBC W/AUTO DIFF WBC: CPT | Performed by: EMERGENCY MEDICINE

## 2024-08-28 PROCEDURE — 63600175 PHARM REV CODE 636 W HCPCS: Performed by: EMERGENCY MEDICINE

## 2024-08-28 PROCEDURE — 96361 HYDRATE IV INFUSION ADD-ON: CPT

## 2024-08-28 PROCEDURE — 94640 AIRWAY INHALATION TREATMENT: CPT

## 2024-08-28 PROCEDURE — 83690 ASSAY OF LIPASE: CPT | Performed by: EMERGENCY MEDICINE

## 2024-08-28 PROCEDURE — 20000000 HC ICU ROOM

## 2024-08-28 PROCEDURE — 96375 TX/PRO/DX INJ NEW DRUG ADDON: CPT

## 2024-08-28 PROCEDURE — 87186 SC STD MICRODIL/AGAR DIL: CPT | Performed by: EMERGENCY MEDICINE

## 2024-08-28 PROCEDURE — 84484 ASSAY OF TROPONIN QUANT: CPT | Performed by: EMERGENCY MEDICINE

## 2024-08-28 PROCEDURE — 71045 X-RAY EXAM CHEST 1 VIEW: CPT | Mod: 26,,, | Performed by: RADIOLOGY

## 2024-08-28 PROCEDURE — 82803 BLOOD GASES ANY COMBINATION: CPT

## 2024-08-28 PROCEDURE — 87040 BLOOD CULTURE FOR BACTERIA: CPT | Performed by: EMERGENCY MEDICINE

## 2024-08-28 PROCEDURE — 87389 HIV-1 AG W/HIV-1&-2 AB AG IA: CPT | Performed by: EMERGENCY MEDICINE

## 2024-08-28 PROCEDURE — U0002 COVID-19 LAB TEST NON-CDC: HCPCS | Performed by: EMERGENCY MEDICINE

## 2024-08-28 PROCEDURE — 25000242 PHARM REV CODE 250 ALT 637 W/ HCPCS: Performed by: EMERGENCY MEDICINE

## 2024-08-28 PROCEDURE — 25000003 PHARM REV CODE 250: Performed by: EMERGENCY MEDICINE

## 2024-08-28 PROCEDURE — 83605 ASSAY OF LACTIC ACID: CPT | Performed by: EMERGENCY MEDICINE

## 2024-08-28 PROCEDURE — 81000 URINALYSIS NONAUTO W/SCOPE: CPT | Performed by: EMERGENCY MEDICINE

## 2024-08-28 PROCEDURE — 87154 CUL TYP ID BLD PTHGN 6+ TRGT: CPT | Performed by: EMERGENCY MEDICINE

## 2024-08-28 PROCEDURE — 94761 N-INVAS EAR/PLS OXIMETRY MLT: CPT

## 2024-08-28 PROCEDURE — 76705 ECHO EXAM OF ABDOMEN: CPT | Mod: TC

## 2024-08-28 PROCEDURE — 96366 THER/PROPH/DIAG IV INF ADDON: CPT

## 2024-08-28 PROCEDURE — 93010 ELECTROCARDIOGRAM REPORT: CPT | Mod: ,,, | Performed by: INTERNAL MEDICINE

## 2024-08-28 PROCEDURE — 93005 ELECTROCARDIOGRAM TRACING: CPT

## 2024-08-28 PROCEDURE — 71045 X-RAY EXAM CHEST 1 VIEW: CPT | Mod: TC

## 2024-08-28 PROCEDURE — 27000221 HC OXYGEN, UP TO 24 HOURS

## 2024-08-28 PROCEDURE — 74176 CT ABD & PELVIS W/O CONTRAST: CPT | Mod: TC

## 2024-08-28 PROCEDURE — 85610 PROTHROMBIN TIME: CPT | Performed by: EMERGENCY MEDICINE

## 2024-08-28 PROCEDURE — 86803 HEPATITIS C AB TEST: CPT | Performed by: EMERGENCY MEDICINE

## 2024-08-28 PROCEDURE — 36415 COLL VENOUS BLD VENIPUNCTURE: CPT | Performed by: EMERGENCY MEDICINE

## 2024-08-28 PROCEDURE — 96365 THER/PROPH/DIAG IV INF INIT: CPT

## 2024-08-28 PROCEDURE — 80048 BASIC METABOLIC PNL TOTAL CA: CPT | Mod: XB | Performed by: EMERGENCY MEDICINE

## 2024-08-28 PROCEDURE — 99900031 HC PATIENT EDUCATION (STAT)

## 2024-08-28 PROCEDURE — 87150 DNA/RNA AMPLIFIED PROBE: CPT | Performed by: EMERGENCY MEDICINE

## 2024-08-28 PROCEDURE — 87077 CULTURE AEROBIC IDENTIFY: CPT | Mod: 59 | Performed by: EMERGENCY MEDICINE

## 2024-08-28 PROCEDURE — 76705 ECHO EXAM OF ABDOMEN: CPT | Mod: 26,,, | Performed by: RADIOLOGY

## 2024-08-28 RX ORDER — NOREPINEPHRINE BITARTRATE/D5W 4MG/250ML
PLASTIC BAG, INJECTION (ML) INTRAVENOUS
Status: DISCONTINUED
Start: 2024-08-28 | End: 2024-08-28 | Stop reason: HOSPADM

## 2024-08-28 RX ORDER — HEPARIN SODIUM 5000 [USP'U]/ML
5000 INJECTION, SOLUTION INTRAVENOUS; SUBCUTANEOUS EVERY 8 HOURS
Status: DISCONTINUED | OUTPATIENT
Start: 2024-08-29 | End: 2024-09-12 | Stop reason: HOSPADM

## 2024-08-28 RX ORDER — IPRATROPIUM BROMIDE AND ALBUTEROL SULFATE 2.5; .5 MG/3ML; MG/3ML
3 SOLUTION RESPIRATORY (INHALATION)
Status: COMPLETED | OUTPATIENT
Start: 2024-08-28 | End: 2024-08-28

## 2024-08-28 RX ORDER — METHYLPREDNISOLONE SOD SUCC 125 MG
125 VIAL (EA) INJECTION
Status: COMPLETED | OUTPATIENT
Start: 2024-08-28 | End: 2024-08-28

## 2024-08-28 RX ORDER — SODIUM CHLORIDE 0.9 % (FLUSH) 0.9 %
10 SYRINGE (ML) INJECTION
Status: DISCONTINUED | OUTPATIENT
Start: 2024-08-29 | End: 2024-09-12 | Stop reason: HOSPADM

## 2024-08-28 RX ADMIN — METHYLPREDNISOLONE SODIUM SUCCINATE 125 MG: 125 INJECTION, POWDER, FOR SOLUTION INTRAMUSCULAR; INTRAVENOUS at 11:08

## 2024-08-28 RX ADMIN — IPRATROPIUM BROMIDE AND ALBUTEROL SULFATE 3 ML: .5; 2.5 SOLUTION RESPIRATORY (INHALATION) at 11:08

## 2024-08-28 RX ADMIN — SODIUM CHLORIDE, POTASSIUM CHLORIDE, SODIUM LACTATE AND CALCIUM CHLORIDE 2040 ML: 600; 310; 30; 20 INJECTION, SOLUTION INTRAVENOUS at 03:08

## 2024-08-28 RX ADMIN — SODIUM CHLORIDE, POTASSIUM CHLORIDE, SODIUM LACTATE AND CALCIUM CHLORIDE 1000 ML: 600; 310; 30; 20 INJECTION, SOLUTION INTRAVENOUS at 05:08

## 2024-08-28 RX ADMIN — PIPERACILLIN AND TAZOBACTAM 4.5 G: 4; .5 INJECTION, POWDER, LYOPHILIZED, FOR SOLUTION INTRAVENOUS; PARENTERAL at 01:08

## 2024-08-28 NOTE — ED TRIAGE NOTES
"Pt brought in by EMS from home for c/o SOB. Per EMS, pt O2 sat 50s% upon arrival. Pt placed on 15L non-rebreather, O2 sat increased to 90s. Upon arrival to ED, pt transitioned to 3L NC, O2 92%. Pt reports SOB started last night. Has been using albuterol inhaler "all day" without relief.  "

## 2024-08-28 NOTE — ED PROVIDER NOTES
History     Chief Complaint   Patient presents with    Shortness of Breath     HPI:  Josh Pinto is a 84 y.o. male with PMH as below who presents to the [facility] emergency department for evaluation of sudden onset, severe SOB that began this AM. He's never had a similar prior episode. Yesterday, he had severe nausea, sensation of not being able to have BM, still persisting today. He has no other complaints.     An independent history was also taken from the patient's paramedic, because of his transportation via ambulance, who added: patient had spO2 50%s on scene, was placed on NRB with improvement.     PCP: Hali Burrell MD    Review of patient's allergies indicates:   Allergen Reactions    Sulfa (sulfonamide antibiotics) Rash      Past Medical History:   Diagnosis Date    Allergy     Arthritis     Cataract     Hypertension      Past Surgical History:   Procedure Laterality Date    BLADDER FULGURATION N/A 8/19/2023    Procedure: FULGURATION, BLADDER;  Surgeon: Abdulaziz Garcia MD;  Location: [facility] OR;  Service: Urology;  Laterality: N/A;    CYSTOSCOPY N/A 8/19/2023    Procedure: CYSTOSCOPY;  Surgeon: Abdulaziz Garcia MD;  Location: [facility] OR;  Service: Urology;  Laterality: N/A;    ERCP N/A 8/29/2024    Procedure: ERCP (ENDOSCOPIC RETROGRADE CHOLANGIOPANCREATOGRAPHY);  Surgeon: Truman Vargas MD;  [facility]  Service: Endoscopy;  Laterality: N/A;    ESOPHAGOGASTRODUODENOSCOPY N/A 9/20/2023    Procedure: EGD (ESOPHAGOGASTRODUODENOSCOPY);  Surgeon: Varinder Melo MD;  Location: [facility] ENDO;  Service: Gastroenterology;  Laterality: N/A;    HERNIA REPAIR N/A 9/3/2023    Procedure: REPAIR, HERNIA INTERNAL;  Surgeon: Andrew Juares MD;  Location: [facility] OR;  Service: General;  Laterality: N/A;    INSERTION OF CATHETER N/A 8/19/2023    Procedure: INSERTION, CATHETER;  Surgeon: Abdulaziz Garcia MD;  Location: [facility] OR;  Service: Urology;  Laterality: N/A;    knee surgery       right knee    LAPAROSCOPIC CHOLECYSTECTOMY N/A 8/30/2024    Procedure: CHOLECYSTECTOMY, LAPAROSCOPIC;  Surgeon: Jenae Rasmussen MD;  Location: [facility] OR [address] [place] [address];  Service: General;  Laterality: N/A;    LAPAROSCOPIC ROBOT-ASSISTED SURGICAL REMOVAL OF KIDNEY USING DA VEIN XI Left 8/20/2023    Procedure: XI ROBOTIC NEPHRECTOMY;  Surgeon: Abdulaziz Garcia MD;  Location: [facility] OR;  Service: Urology;  Laterality: Left;    LAPAROTOMY, EXPLORATORY N/A 9/3/2023    Procedure: LAPAROTOMY, EXPLORATORY;  Surgeon: Andrew Juares MD;  Location: [facility] OR;  Service: General;  Laterality: N/A;       Family History   Problem Relation Name Age of Onset    Heart disease Mother      Cancer Paternal Grandmother          stroke    Cancer Maternal Grandmother          lung cancer    Cancer Maternal Grandfather          brain cancer     Social History     Tobacco Use    Smoking status: Former     Passive exposure: Past    Smokeless tobacco: Never   Substance and Sexual Activity    Alcohol use: No    Drug use: No    Sexual activity: Yes     Partners: Female      Review of Systems     Review of Systems   Constitutional: Negative.  Negative for fever.   HENT: Negative.     Eyes: Negative.    Respiratory: Negative.  Negative for cough.    Cardiovascular: Negative.    Gastrointestinal:  Positive for nausea. Negative for abdominal pain.   Endocrine: Negative.    Genitourinary: Negative.  Negative for dysuria.   Musculoskeletal: Negative.    Skin: Negative.    Allergic/Immunologic: Negative.    Neurological: Negative.    Hematological: Negative.    Psychiatric/Behavioral: Negative.     All other systems reviewed and are negative.       Physical Exam     Initial Vitals   BP Pulse Resp Temp SpO2   08/28/24 1022 08/28/24 1022 08/28/24 1022 08/28/24 1022 08/28/24 1016   (!) 106/57 (!) 117 (!) 22 98.4 °F (36.9 °C) (!) 92 %      MAP       --                 Nursing notes and vital signs reviewed.  Constitutional: Patient is in moderate distress.    Head: Normocephalic. Atraumatic.   Eyes:  Conjunctivae are not pale. No scleral icterus.   ENT: Mucous membranes moist.   Neck: Supple.   Cardiovascular: Tachycardic. Regular rhythm. No murmurs, rubs, or gallops   Pulmonary: Tachypnea. BBS clear, mildly decreased air exchange.   Abdominal: Soft. Non-distended. Epigastric and RUQ tenderness with +Heath's sign.   Musculoskeletal: Moves all extremities. No obvious deformities.   Skin: Warm and dry.   Neurological:  Alert, awake, and appropriate. Normal speech. No acute lateralizing neurologic deficits appreciated.   Psychiatric: Normal affect.       ED Course   Critical Care    Date/Time: 8/28/2024 11:18 AM    Performed by: Flex Huffman MD  Authorized by: Flex Huffman MD  Direct patient critical care time: 15 minutes  Additional history critical care time: 5 minutes  Ordering / reviewing critical care time: 5 minutes  Documentation critical care time: 5 minutes  Consulting other physicians critical care time: 5 minutes  Total critical care time (exclusive of procedural time) : 35 minutes  Critical care time was exclusive of separately billable procedures and treating other patients and teaching time.  Critical care was necessary to treat or prevent imminent or life-threatening deterioration of the following conditions: respiratory failure and sepsis (hypoxia).  Critical care was time spent personally by me on the following activities: blood draw for specimens, development of treatment plan with patient or surrogate, interpretation of cardiac output measurements, evaluation of patient's response to treatment, examination of patient, obtaining history from patient or surrogate, ordering and performing treatments and interventions, ordering and review of laboratory studies, ordering and review of radiographic studies, pulse oximetry, re-evaluation of patient's condition, review of old charts and discussions with consultants.        Vitals:    08/28/24  1122 08/28/24 1223 08/28/24 1330 08/28/24 1409   BP: (!) 106/59  (!) 103/56    Pulse: 87 103 98    Resp: (!) 22 (!) 21 (!) 21    Temp: 98.6 °F (37 °C) 98.6 °F (37 °C)     TempSrc: Oral Oral     SpO2: 96% (!) 92% (!) 92% (!) 92%   Weight:       Height:        08/28/24 1420 08/28/24 1550 08/28/24 1702 08/28/24 1746   BP: (!) 98/59 (!) 99/56 (!) 107/59 (!) 107/59   Pulse: 93 86 75 67   Resp: (!) 24 19 (!) 21 (!) 21   Temp:  98.6 °F (37 °C) 98.6 °F (37 °C) 98.8 °F (37.1 °C)   TempSrc:  Oral Oral    SpO2: (!) 92% (!) 93% 97% (!) 94%   Weight:       Height:        08/28/24 1802 08/28/24 1849 08/28/24 1916 08/28/24 2036   BP: (!) 102/55 (!) 102/55 (!) 104/57 (!) 107/57   Pulse: 63 67 66 (!) 56   Resp: 20 (!) 22 (!) 26 20   Temp:  98.8 °F (37.1 °C)     TempSrc:  Oral     SpO2: (!) 94% (!) 94% (!) 92%    Weight:       Height:        08/28/24 2046 08/28/24 2103 08/28/24 2108   BP:  (!) 119/92    Pulse:  (!) 57    Resp:  (!) 21    Temp:   98.4 °F (36.9 °C)   TempSrc:   Oral   SpO2: (!) 94% (!) 94%    Weight:      Height:        Lab Results Interpreted as Abnormal:  Labs Reviewed   CULTURE, BLOOD - Abnormal       Result Value    Blood Culture, Routine Gram stain aer bottle: Gram negative rods      Blood Culture, Routine Gram stain brenda bottle: Gram negative rods      Blood Culture, Routine        Value: Positive results previously called 08/29/2024  09:33    Blood Culture, Routine KLEBSIELLA OXYTOCA (*)     Narrative:     Aerobic and anaerobic   CULTURE, BLOOD - Abnormal    Blood Culture, Routine Gram stain aer bottle: Gram negative rods      Blood Culture, Routine        Value: Results called to and read back by: Irais Nguyen RN 08/29/2024  05:33    Blood Culture, Routine Gram stain brenda bottle: Gram negative rods      Blood Culture, Routine        Value: Positive results previously called 08/29/2024  09:31    Blood Culture, Routine   (*)     Value: KLEBSIELLA OXYTOCA   For susceptibility see order #[id number]       Narrative:     Aerobic and anaerobic   RAPID ORGANISM ID BY PCR (FROM BLOOD CULTURE) - Abnormal    Enterococcus faecalis Not Detected      Enterococcus faecium Not Detected      Listeria monocytogenes Not Detected      Staphylococcus spp. Not Detected      Staphylococcus aureus Not Detected      Staphylococcus epidermidis Not Detected      Staphylococcus lugdunensis Not Detected      Streptococcus species Not Detected      Streptococcus agalactiae Not Detected      Streptococcus pneumoniae Not Detected      Streptococcus pyogenes Not Detected      Acinetobacter calcoaceticus/baumannii complex Not Detected      Bacteroides fragilis Not Detected      Enterobacterales Detected (*)     Enterobacter cloacae complex Not Detected      Escherichia coli Not Detected      Klebsiella aerogenes Not Detected      Klebsiella oxytoca Not Detected      Klebsiella pneumoniae group Not Detected      Proteus Not Detected      Salmonella sp Not Detected      Serratia marcescens Not Detected      Haemophilus influenzae Not Detected      Neisseria meningtidis Not Detected      Pseudomonas aeruginosa Detected (*)     Stenotrophomonas maltophilia Not Detected      Candida albicans Not Detected      Candida auris Not Detected      Candida glabrata Not Detected      Candida krusei Not Detected      Candida parapsilosis Not Detected      Candida tropicalis Not Detected      Cryptococcus neoformans/gattii Not Detected      CTX-M (ESBL ) Not Detected      IMP (Carbapenem resistant) Not Detected      KPC resistance gene (Carbapenem resistant) Not Detected      mcr-1  Test Not Applicable      mec A/C  Test Not Applicable      mec A/C and MREJ (MRSA) gene Test Not Applicable      NDM (Carbapenem resistant) Not Detected      OXA-48-like (Carbapenem resistant) Not Detected      van A/B (VRE gene) Test Not Applicable      VIM (Carbapenem resistant) Not Detected      Narrative:     Aerobic and anaerobic   CBC W/ AUTO DIFFERENTIAL - Abnormal     WBC 6.97      RBC 4.62      Hemoglobin 14.2      Hematocrit 42.9      MCV 93      MCH 30.7      MCHC 33.1      RDW 14.6 (*)     Platelets 151      MPV 11.0      Immature Granulocytes 0.6 (*)     Gran # (ANC) 6.7      Immature Grans (Abs) 0.04      Lymph # 0.1 (*)     Mono # 0.2 (*)     Eos # 0.0      Baso # 0.00      nRBC 0      Gran % 96.0 (*)     Lymph % 1.1 (*)     Mono % 2.3 (*)     Eosinophil % 0.0      Basophil % 0.0      Differential Method Automated     COMPREHENSIVE METABOLIC PANEL - Abnormal    Sodium 143      Potassium 4.5      Chloride 111 (*)     CO2 16 (*)     Glucose 141 (*)     BUN 48 (*)     Creatinine 3.1 (*)     Calcium 9.6      Total Protein 7.2      Albumin 3.3 (*)     Total Bilirubin 7.3 (*)     Alkaline Phosphatase 420 (*)      (*)      (*)     eGFR 19.3 (*)     Anion Gap 16     URINALYSIS, REFLEX TO URINE CULTURE - Abnormal    Specimen UA Urine, Unspecified      Color, UA Toyin      Appearance, UA Clear      pH, UA 6.0      Specific Gravity, UA 1.015      Protein, UA 2+ (*)     Glucose, UA Negative      Ketones, UA Trace (*)     Bilirubin (UA) 3+ (*)     Occult Blood UA 2+ (*)     Nitrite, UA Negative      Urobilinogen, UA 1.0      Leukocytes, UA Negative      Narrative:     Preferred Collection Type->Urine, Clean Catch  Specimen Source->Urine   LIPASE - Abnormal    Lipase 812 (*)    TROPONIN I - Abnormal    Troponin I 0.210 (*)    URINALYSIS MICROSCOPIC - Abnormal    RBC, UA 6 (*)     WBC, UA 3      Bacteria Few (*)     Squam Epithel, UA 2      Hyaline Casts, UA 0      Microscopic Comment SEE COMMENT      Narrative:     Preferred Collection Type->Urine, Clean Catch  Specimen Source->Urine   LACTIC ACID, PLASMA - Abnormal    Lactate (Lactic Acid) 4.7 (*)     Narrative:     Lactic acid critical result(s) called and verbal readback obtained   from Mario Moore  by [facility] 08/28/2024 14:16   LACTIC ACID, PLASMA - Abnormal    Lactate (Lactic Acid) 9.9 (*)     Narrative:        LacticAcid critical result(s) called and verbal readback obtained   from Racquel YODER RN ED  by [facility] 08/28/2024 17:39   BASIC METABOLIC PANEL - Abnormal    Sodium 139      Potassium 4.2      Chloride 106      CO2 18 (*)     Glucose 135 (*)     BUN 44 (*)     Creatinine 3.0 (*)     Calcium 8.5 (*)     Anion Gap 15      eGFR 20.1 (*)    LACTIC ACID, PLASMA - Abnormal    Lactate (Lactic Acid) 3.4 (*)    ISTAT PROCEDURE - Abnormal    POC PH 7.468 (*)     POC PCO2 23.3 (*)     POC PO2 61 (*)     POC HCO3 16.9 (*)     POC BE -7 (*)     POC SATURATED O2 93      POC TCO2 18 (*)     Sample ARTERIAL      Site LR      Allens Test Pass      DelSys Nasal Can      Mode SPONT      Flow 4      FiO2 36      Sp02 94     HIV 1 / 2 ANTIBODY    HIV 1/2 Ag/Ab Non-reactive      Narrative:     Release to patient->Immediate   HEPATITIS C ANTIBODY    Hepatitis C Ab Non-reactive      Narrative:     Release to patient->Immediate   SARS-COV-2 RNA AMPLIFICATION, QUAL    SARS-CoV-2 RNA, Amplification, Qual Negative     PROTIME-INR   PROTIME-INR    Prothrombin Time 11.4      INR 1.1        All Lab Results:  Results for orders placed or performed during the hospital encounter of 08/28/24   Blood culture x two cultures. Draw prior to antibiotics.    Specimen: Peripheral, Antecubital, Right; Blood   Result Value Ref Range    Blood Culture, Routine Gram stain aer bottle: Gram negative rods     Blood Culture, Routine Gram stain brenda bottle: Gram negative rods     Blood Culture, Routine       Positive results previously called 08/29/2024  09:33    Blood Culture, Routine KLEBSIELLA OXYTOCA (A)        Susceptibility    Klebsiella oxytoca - CULTURE, BLOOD     Amp/Sulbactam <=4/2 Sensitive mcg/mL     Ampicillin >16 Resistant mcg/mL     Amox/K Clav'ate <=8/4 Sensitive mcg/mL     Ceftriaxone <=1 Sensitive mcg/mL     Cefazolin <=2 Sensitive mcg/mL     Ciprofloxacin 1 Resistant mcg/mL     Cefepime <=2 Sensitive mcg/mL     Ceftazidime/Avibactam <=4 Sensitive  mcg/mL     Ertapenem <=0.5 Sensitive mcg/mL     Gentamicin <=2 Sensitive mcg/mL     Levofloxacin <=0.5 Sensitive mcg/mL     Meropenem* <=1 Sensitive mcg/mL      * Not Detected, IMP, KPC,VIM,NDM or OXA-48 genes     Meropenem/Vaborbactam <=2 Sensitive mcg/mL     Piperacillin/Tazo <=8 Sensitive mcg/mL     Trimeth/Sulfa >2/38 Resistant mcg/mL     Tetracycline >8 Resistant mcg/mL     Tobramycin 4 Intermediate mcg/mL   Blood culture x two cultures. Draw prior to antibiotics.    Specimen: Peripheral, Forearm, Left; Blood   Result Value Ref Range    Blood Culture, Routine Gram stain aer bottle: Gram negative rods     Blood Culture, Routine       Results called to and read back by: Irais Nguyen RN 08/29/2024  05:33    Blood Culture, Routine Gram stain brenda bottle: Gram negative rods     Blood Culture, Routine       Positive results previously called 08/29/2024  09:31    Blood Culture, Routine (A)      KLEBSIELLA OXYTOCA   For susceptibility see order #[id number]     Rapid Organism ID by PCR (from Blood culture)   Result Value Ref Range    Enterococcus faecalis Not Detected Not Detected    Enterococcus faecium Not Detected Not Detected    Listeria monocytogenes Not Detected Not Detected    Staphylococcus spp. Not Detected Not Detected    Staphylococcus aureus Not Detected Not Detected    Staphylococcus epidermidis Not Detected Not Detected    Staphylococcus lugdunensis Not Detected Not Detected    Streptococcus species Not Detected Not Detected    Streptococcus agalactiae Not Detected Not Detected    Streptococcus pneumoniae Not Detected Not Detected    Streptococcus pyogenes Not Detected Not Detected    Acinetobacter calcoaceticus/baumannii complex Not Detected Not Detected    Bacteroides fragilis Not Detected Not Detected    Enterobacterales Detected (A) Not Detected    Enterobacter cloacae complex Not Detected Not Detected    Escherichia coli Not Detected Not Detected    Klebsiella aerogenes Not Detected Not Detected     Klebsiella oxytoca Not Detected Not Detected    Klebsiella pneumoniae group Not Detected Not Detected    Proteus Not Detected Not Detected    Salmonella sp Not Detected Not Detected    Serratia marcescens Not Detected Not Detected    Haemophilus influenzae Not Detected Not Detected    Neisseria meningtidis Not Detected Not Detected    Pseudomonas aeruginosa Detected (A) Not Detected    Stenotrophomonas maltophilia Not Detected Not Detected    Candida albicans Not Detected Not Detected    Candida auris Not Detected Not Detected    Candida glabrata Not Detected Not Detected    Candida krusei Not Detected Not Detected    Candida parapsilosis Not Detected Not Detected    Candida tropicalis Not Detected Not Detected    Cryptococcus neoformans/gattii Not Detected Not Detected    CTX-M (ESBL ) Not Detected Not Detected    IMP (Carbapenem resistant) Not Detected Not Detected    KPC resistance gene (Carbapenem resistant) Not Detected Not Detected    mcr-1  Test Not Applicable Not Detected    mec A/C  Test Not Applicable Not Detected    mec A/C and MREJ (MRSA) gene Test Not Applicable Not Detected    NDM (Carbapenem resistant) Not Detected Not Detected    OXA-48-like (Carbapenem resistant) Not Detected Not Detected    van A/B (VRE gene) Test Not Applicable Not Detected    VIM (Carbapenem resistant) Not Detected Not Detected   HIV 1/2 Ag/Ab (4th Gen)   Result Value Ref Range    HIV 1/2 Ag/Ab Non-reactive Non-reactive   Hepatitis C Antibody   Result Value Ref Range    Hepatitis C Ab Non-reactive Non-reactive   CBC auto differential   Result Value Ref Range    WBC 6.97 3.90 - 12.70 K/uL    RBC 4.62 4.60 - 6.20 M/uL    Hemoglobin 14.2 14.0 - 18.0 g/dL    Hematocrit 42.9 40.0 - 54.0 %    MCV 93 82 - 98 fL    MCH 30.7 27.0 - 31.0 pg    MCHC 33.1 32.0 - 36.0 g/dL    RDW 14.6 (H) 11.5 - 14.5 %    Platelets 151 150 - 450 K/uL    MPV 11.0 9.2 - 12.9 fL    Immature Granulocytes 0.6 (H) 0.0 - 0.5 %    Gran # (ANC) 6.7 1.8 -  7.7 K/uL    Immature Grans (Abs) 0.04 0.00 - 0.04 K/uL    Lymph # 0.1 (L) 1.0 - 4.8 K/uL    Mono # 0.2 (L) 0.3 - 1.0 K/uL    Eos # 0.0 0.0 - 0.5 K/uL    Baso # 0.00 0.00 - 0.20 K/uL    nRBC 0 0 /100 WBC    Gran % 96.0 (H) 38.0 - 73.0 %    Lymph % 1.1 (L) 18.0 - 48.0 %    Mono % 2.3 (L) 4.0 - 15.0 %    Eosinophil % 0.0 0.0 - 8.0 %    Basophil % 0.0 0.0 - 1.9 %    Differential Method Automated    Comprehensive metabolic panel   Result Value Ref Range    Sodium 143 136 - 145 mmol/L    Potassium 4.5 3.5 - 5.1 mmol/L    Chloride 111 (H) 95 - 110 mmol/L    CO2 16 (L) 23 - 29 mmol/L    Glucose 141 (H) 70 - 110 mg/dL    BUN 48 (H) 8 - 23 mg/dL    Creatinine 3.1 (H) 0.5 - 1.4 mg/dL    Calcium 9.6 8.7 - 10.5 mg/dL    Total Protein 7.2 6.0 - 8.4 g/dL    Albumin 3.3 (L) 3.5 - 5.2 g/dL    Total Bilirubin 7.3 (H) 0.1 - 1.0 mg/dL    Alkaline Phosphatase 420 (H) 55 - 135 U/L     (H) 10 - 40 U/L     (H) 10 - 44 U/L    eGFR 19.3 (A) >60 mL/min/1.73 m^2    Anion Gap 16 8 - 16 mmol/L   Urinalysis, Reflex to Urine Culture Urine, Clean Catch    Specimen: Urine, Clean Catch   Result Value Ref Range    Specimen UA Urine, Unspecified     Color, UA Toyin Yellow, Straw, Toyin    Appearance, UA Clear Clear    pH, UA 6.0 5.0 - 8.0    Specific Gravity, UA 1.015 1.005 - 1.030    Protein, UA 2+ (A) Negative    Glucose, UA Negative Negative    Ketones, UA Trace (A) Negative    Bilirubin (UA) 3+ (A) Negative    Occult Blood UA 2+ (A) Negative    Nitrite, UA Negative Negative    Urobilinogen, UA 1.0 Negative EU/dL    Leukocytes, UA Negative Negative   Lipase   Result Value Ref Range    Lipase 812 (H) 4 - 60 U/L   Troponin I   Result Value Ref Range    Troponin I 0.210 (H) 0.000 - 0.026 ng/mL   COVID-19 Rapid Screening   Result Value Ref Range    SARS-CoV-2 RNA, Amplification, Qual Negative Negative   Urinalysis Microscopic   Result Value Ref Range    RBC, UA 6 (H) 0 - 4 /hpf    WBC, UA 3 0 - 5 /hpf    Bacteria Few (A) None-Occ /hpf     Squam Epithel, UA 2 /hpf    Hyaline Casts, UA 0 0-1/lpf /lpf    Microscopic Comment SEE COMMENT    Lactic acid, plasma #1   Result Value Ref Range    Lactate (Lactic Acid) 4.7 (HH) 0.5 - 2.2 mmol/L   Lactic acid, plasma #2   Result Value Ref Range    Lactate (Lactic Acid) 9.9 (HH) 0.5 - 2.2 mmol/L   Protime-INR   Result Value Ref Range    Prothrombin Time 11.4 9.0 - 12.5 sec    INR 1.1 0.8 - 1.2   Basic metabolic panel   Result Value Ref Range    Sodium 139 136 - 145 mmol/L    Potassium 4.2 3.5 - 5.1 mmol/L    Chloride 106 95 - 110 mmol/L    CO2 18 (L) 23 - 29 mmol/L    Glucose 135 (H) 70 - 110 mg/dL    BUN 44 (H) 8 - 23 mg/dL    Creatinine 3.0 (H) 0.5 - 1.4 mg/dL    Calcium 8.5 (L) 8.7 - 10.5 mg/dL    Anion Gap 15 8 - 16 mmol/L    eGFR 20.1 (A) >60 mL/min/1.73 m^2   Lactic acid, plasma   Result Value Ref Range    Lactate (Lactic Acid) 3.4 (H) 0.5 - 2.2 mmol/L   EKG 12-lead   Result Value Ref Range    QRS Duration 66 ms    [facility] QTC Calculation 462 ms   ISTAT PROCEDURE   Result Value Ref Range    POC PH 7.468 (H) 7.35 - 7.45    POC PCO2 23.3 (LL) 35 - 45 mmHg    POC PO2 61 (L) 80 - 100 mmHg    POC HCO3 16.9 (L) 24 - 28 mmol/L    POC BE -7 (L) -2 to 2 mmol/L    POC SATURATED O2 93 95 - 100 %    POC TCO2 18 (L) 23 - 27 mmol/L    Sample ARTERIAL     Site LR     Allens Test Pass     DelSys Nasal Can     Mode SPONT     Flow 4     FiO2 36     Sp02 94      Imaging Results              US Abdomen Limited_Gallbladder (Final result)  Result time 08/28/24 14:24:46      Final result by Toshia Yanez MD (08/28/24 14:24:46)                   Impression:      Gallstones with findings concerning for acute cholecystitis.    Intra and extrahepatic biliary ductal dilatation.  This may be a result of choledocholithiasis, distal mass or stricture.  Further evaluation with MRCP recommended.      Electronically signed by: Toshia Yanez  Date:    08/28/2024  Time:    14:24               Narrative:    EXAMINATION:  US ABDOMEN  LIMITED_GALLBLADDER    CLINICAL HISTORY:  biliary obstruction;    TECHNIQUE:  Limited ultrasound of the right upper quadrant of the abdomen focused on the biliary system was performed.    COMPARISON:  CT today    FINDINGS:  Gallbladder: There are numerous mobile gallstones.  The gallbladder wall is mildly thickened measuring 4 mm.  No pericholecystic fluid is present.    Biliary system: The common duct is dilated, measuring 14 mm.  There is moderate intrahepatic biliary ductal dilatation.    Pancreas: Poorly visualized due to bowel gas.    Miscellaneous: No upper abdominal ascites and no abnormalities of the visualized liver.                                       CT Abdomen Pelvis  Without Contrast (Final result)  Result time 08/28/24 12:14:34      Final result by Toshia Yanez MD (08/28/24 12:14:34)                   Impression:      Intra and extrahepatic biliary ductal dilatation of uncertain etiology.  This may be result of choledocholithiasis, stricture or mass.  Follow-up with MRCP would be helpful.    Suspect the presence of gallstones.    Massive enlargement of the prostate similar to the previous study.  Previous left nephrectomy.  Stable rim calcified structure at the midpole of the right kidney.      Electronically signed by: Toshia Yanez  Date:    08/28/2024  Time:    12:14               Narrative:    EXAMINATION:  CT ABDOMEN PELVIS WITHOUT CONTRAST    CLINICAL HISTORY:  Bowel obstruction suspected;CKD patient can't use contrast;    TECHNIQUE:  Low dose axial images, sagittal and coronal reformations were obtained from the lung bases to the pubic symphysis.  Neither oral nor IV contrast was administered    COMPARISON:  05/20/2024    FINDINGS:  There is dependent atelectasis in the lung bases as well as trace pleural effusions.    There is intra and extrahepatic biliary ductal dilatation which is more pronounced with respect to the prior study.  Gallstones are suspected.  No pancreatic  ductal dilatation is appreciated on this noncontrast study.  No definite pancreatic or ampullary mass is identified.  The spleen and adrenal glands are normal.  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  The rim calcified structure at the midpole of the right kidney measuring up to 2.7 cm on the coronal images is unchanged on studies dating back to at least 2015.  In addition, there are small simple appearing cysts at the mid and lower poles of the right kidney.    The aorta is normal in caliber with extensive calcific plaque.    The urinary bladder is thick-walled and collapsed.  The prostate is markedly enlarged measuring 7.7 x 8.3 by 9.3 cm.    There are distal colonic diverticula.  There is no evidence of acute diverticulitis.  There is no bowel obstruction.  No inflammation in the bowel.  A normal appendix is present.    There is no ascites.                                       X-Ray Chest AP Portable (Final result)  Result time 08/28/24 11:33:18      Final result by Toshia Yanez MD (08/28/24 11:33:18)                   Impression:      Increased opacification of the lung bases, may be due to viral infection, bronchitis, atelectasis.      Electronically signed by: Toshia Yanez  Date:    08/28/2024  Time:    11:33               Narrative:    EXAMINATION:  XR CHEST AP PORTABLE    CLINICAL HISTORY:  Acute respiratory failure with hypoxia    TECHNIQUE:  Single frontal view of the chest was performed.    COMPARISON:  None    FINDINGS:  There are new interstitial opacities at the lung bases.  There is no focal consolidation or pleural effusion.  The cardiomediastinal silhouette is within normal limits.                                     The EKG was ordered, reviewed, and independently interpreted by the ED Physician:  Rhythm: sinus tachycardia   Rate: 121 bpm  Nonspecific ST-T changes. No STEMI.   Leftward axis deviation.    Normal intervals     The emergency physician reviewed the vital signs and test  results, which are outlined above.     ED Discussion     ED Course as of 09/06/24 0439   Wed Aug 28, 2024   1425 Sepsis Perfusion Exam: I attest that a sepsis perfusion exam was performed within 6 hours of sepsis, severe sepsis, or septic shock presentation, following fluid resuscitation.   [ND]   1447 Needs transfer for advanced endoscopy services for sepsis from cholecystitis s/t acute pancreaticobiliary obstruction, likely choledocholith given patient's tempo of illness.  [ND]   1741 Calling [facility] to expedite transfer, need for source control for severe sepsis.  [ND]      ED Course User Index  [ND] Flex Huffman MD         ED Medication(s) Administered:  Medications   albuterol-ipratropium 2.5 mg-0.5 mg/3 mL nebulizer solution 3 mL (3 mLs Nebulization Given 8/28/24 1115)   methylPREDNISolone sodium succinate injection 125 mg (125 mg Intravenous Given 8/28/24 1120)   piperacillin-tazobactam (ZOSYN) 4.5 g in D5W 100 mL IVPB (MB+) (0 g Intravenous Stopped 8/28/24 1554)   lactated ringers bolus 2,040 mL (0 mLs Intravenous Stopped 8/28/24 1712)   lactated ringers bolus 1,000 mL (0 mLs Intravenous Stopped 8/28/24 1902)       Prescription Management: I performed a review of the patient's current Rx medication list as input by nursing staff.    Discharge Medication List as of 8/28/2024 10:44 PM        CONTINUE these medications which have NOT CHANGED    Details   allopurinoL (ZYLOPRIM) 100 MG tablet Take a half a tablet every other day due to your chronic kidney disease, Normal      cholecalciferol, vitamin D3, (VITAMIN D3) 125 mcg (5,000 unit) Tab Take 5,000 Units by mouth once daily., Historical Med      colchicine (COLCRYS) 0.6 mg tablet 1.2 mg at the first sign of  gout flare, followed in 1 hour with a single dose of 0.6 mg. repeat treatment should not occur for at least 14 days. Use with caution due to chronic kidney disease, Normal      finasteride (PROSCAR) 5 mg tablet Take 1 tablet (5 mg total) by mouth once  daily., Starting Thu 8/15/2024, Until 2024, Normal      fludrocortisone (FLORINEF) 0.1 mg Tab Take 100 mcg by mouth every morning., Starting 2024, Historical Med      iron-vitamin C 100-250 mg, ICAR-C, 100-250 mg Tab Take 1 tablet by mouth every morning., Starting 2023, Historical Med      magnesium oxide (MAG-OX) 250 mg magnesium Tab SMARTSI caplet By Mouth Every Morning, Historical Med      metoprolol tartrate (LOPRESSOR) 25 MG tablet Take 12.5 mg by mouth 2 (two) times daily., Starting Mon 10/30/2023, Historical Med      potassium chloride SA (K-DUR,KLOR-CON) 20 MEQ tablet Take 20 mEq by mouth every morning., Starting 2024, Historical Med      tamsulosin (FLOMAX) 0.4 mg Cap Take 1 capsule (0.4 mg total) by mouth once daily., Starting Thu 8/15/2024, Until 2024, Normal      pantoprazole (PROTONIX) 40 MG tablet Take 1 tablet (40 mg total) by mouth 2 (two) times daily before meals., Starting Thu 10/5/2023, Until Fri 10/4/2024, Normal                 Clinical Impression       ICD-10-CM ICD-9-CM   1. Biliary obstruction  K83.1 576.2   2. SOB (shortness of breath)  R06.02 786.05   3. Acute hypoxic respiratory failure  J96.01 518.81   4. Acute biliary pancreatitis, unspecified complication status  K85.10 577.0   5. Acute cholecystitis  K81.0 575.0   6. Severe sepsis  A41.9 038.9    R65.20 995.92      ED Disposition Condition    Transfer to Another Facility Serious                Flex Huffman MD  [phone]

## 2024-08-29 ENCOUNTER — ANESTHESIA EVENT (OUTPATIENT)
Dept: SURGERY | Facility: HOSPITAL | Age: 84
End: 2024-08-29
Payer: MEDICARE

## 2024-08-29 ENCOUNTER — ANESTHESIA EVENT (OUTPATIENT)
Dept: ENDOSCOPY | Facility: HOSPITAL | Age: 84
End: 2024-08-29
Payer: MEDICARE

## 2024-08-29 ENCOUNTER — ANESTHESIA (OUTPATIENT)
Dept: ENDOSCOPY | Facility: HOSPITAL | Age: 84
End: 2024-08-29
Payer: MEDICARE

## 2024-08-29 PROBLEM — K83.09 CHOLANGITIS: Status: ACTIVE | Noted: 2024-08-29

## 2024-08-29 LAB
ABO + RH BLD: NORMAL
ACINETOBACTER CALCOACETICUS/BAUMANNII COMPLEX: NOT DETECTED
ALBUMIN SERPL BCP-MCNC: 2.3 G/DL (ref 3.5–5.2)
ALP SERPL-CCNC: 238 U/L (ref 55–135)
ALT SERPL W/O P-5'-P-CCNC: 225 U/L (ref 10–44)
ANION GAP SERPL CALC-SCNC: 12 MMOL/L (ref 8–16)
ANION GAP SERPL CALC-SCNC: 13 MMOL/L (ref 8–16)
AST SERPL-CCNC: 123 U/L (ref 10–40)
BACTEROIDES FRAGILIS: NOT DETECTED
BASOPHILS # BLD AUTO: 0.05 K/UL (ref 0–0.2)
BASOPHILS NFR BLD: 0.4 % (ref 0–1.9)
BILIRUB SERPL-MCNC: 4.2 MG/DL (ref 0.1–1)
BLD GP AB SCN CELLS X3 SERPL QL: NORMAL
BUN SERPL-MCNC: 44 MG/DL (ref 8–23)
BUN SERPL-MCNC: 48 MG/DL (ref 8–23)
CALCIUM SERPL-MCNC: 8.2 MG/DL (ref 8.7–10.5)
CALCIUM SERPL-MCNC: 8.7 MG/DL (ref 8.7–10.5)
CANDIDA ALBICANS: NOT DETECTED
CANDIDA AURIS: NOT DETECTED
CANDIDA GLABRATA: NOT DETECTED
CANDIDA KRUSEI: NOT DETECTED
CANDIDA PARAPSILOSIS: NOT DETECTED
CANDIDA TROPICALIS: NOT DETECTED
CHLORIDE SERPL-SCNC: 104 MMOL/L (ref 95–110)
CHLORIDE SERPL-SCNC: 107 MMOL/L (ref 95–110)
CO2 SERPL-SCNC: 18 MMOL/L (ref 23–29)
CO2 SERPL-SCNC: 19 MMOL/L (ref 23–29)
CREAT SERPL-MCNC: 3 MG/DL (ref 0.5–1.4)
CREAT SERPL-MCNC: 3.1 MG/DL (ref 0.5–1.4)
CRYPTOCOCCUS NEOFORMANS/GATTII: NOT DETECTED
CTX-M GENE (ESBL PRODUCER): NOT DETECTED
DIFFERENTIAL METHOD BLD: ABNORMAL
ENTEROBACTER CLOACAE COMPLEX: NOT DETECTED
ENTEROBACTERALES: DETECTED
ENTEROCOCCUS FAECALIS: NOT DETECTED
ENTEROCOCCUS FAECIUM: NOT DETECTED
EOSINOPHIL # BLD AUTO: 0 K/UL (ref 0–0.5)
EOSINOPHIL NFR BLD: 0 % (ref 0–8)
ERYTHROCYTE [DISTWIDTH] IN BLOOD BY AUTOMATED COUNT: 14.9 % (ref 11.5–14.5)
ESCHERICHIA COLI: NOT DETECTED
EST. GFR  (NO RACE VARIABLE): 19.3 ML/MIN/1.73 M^2
EST. GFR  (NO RACE VARIABLE): 20.1 ML/MIN/1.73 M^2
GLUCOSE SERPL-MCNC: 113 MG/DL (ref 70–110)
GLUCOSE SERPL-MCNC: 244 MG/DL (ref 70–110)
HAEMOPHILUS INFLUENZAE: NOT DETECTED
HCT VFR BLD AUTO: 34.4 % (ref 40–54)
HGB BLD-MCNC: 11.4 G/DL (ref 14–18)
IMM GRANULOCYTES # BLD AUTO: 0.26 K/UL (ref 0–0.04)
IMM GRANULOCYTES NFR BLD AUTO: 2 % (ref 0–0.5)
IMP GENE (CARBAPENEM RESISTANT): NOT DETECTED
KLEBSIELLA AEROGENES: NOT DETECTED
KLEBSIELLA OXYTOCA: NOT DETECTED
KLEBSIELLA PNEUMONIAE GROUP: NOT DETECTED
KPC RESISTANCE GENE (CARBAPENEM): NOT DETECTED
LACTATE SERPL-SCNC: 1 MMOL/L (ref 0.5–2.2)
LACTATE SERPL-SCNC: 1.5 MMOL/L (ref 0.5–2.2)
LACTATE SERPL-SCNC: 2.1 MMOL/L (ref 0.5–2.2)
LISTERIA MONOCYTOGENES: NOT DETECTED
LYMPHOCYTES # BLD AUTO: 0.6 K/UL (ref 1–4.8)
LYMPHOCYTES NFR BLD: 4.6 % (ref 18–48)
MAGNESIUM SERPL-MCNC: 1.5 MG/DL (ref 1.6–2.6)
MAGNESIUM SERPL-MCNC: 2.3 MG/DL (ref 1.6–2.6)
MCH RBC QN AUTO: 31.1 PG (ref 27–31)
MCHC RBC AUTO-ENTMCNC: 33.1 G/DL (ref 32–36)
MCR-1: ABNORMAL
MCV RBC AUTO: 94 FL (ref 82–98)
MEC A/C AND MREJ (MRSA): ABNORMAL
MEC A/C: ABNORMAL
MONOCYTES # BLD AUTO: 0.7 K/UL (ref 0.3–1)
MONOCYTES NFR BLD: 5.1 % (ref 4–15)
NDM GENE (CARBAPENEM RESISTANT): NOT DETECTED
NEISSERIA MENINGITIDIS: NOT DETECTED
NEUTROPHILS # BLD AUTO: 11.4 K/UL (ref 1.8–7.7)
NEUTROPHILS NFR BLD: 87.9 % (ref 38–73)
NRBC BLD-RTO: 0 /100 WBC
OXA-48-LIKE (CARBAPENEM RESISTANT): NOT DETECTED
PHOSPHATE SERPL-MCNC: 3.8 MG/DL (ref 2.7–4.5)
PLATELET # BLD AUTO: 141 K/UL (ref 150–450)
PLATELET BLD QL SMEAR: ABNORMAL
PMV BLD AUTO: 11.8 FL (ref 9.2–12.9)
POTASSIUM SERPL-SCNC: 4.1 MMOL/L (ref 3.5–5.1)
POTASSIUM SERPL-SCNC: 4.1 MMOL/L (ref 3.5–5.1)
PROT SERPL-MCNC: 5.7 G/DL (ref 6–8.4)
PROTEUS SPECIES: NOT DETECTED
PSEUDOMONAS AERUGINOSA: DETECTED
RBC # BLD AUTO: 3.67 M/UL (ref 4.6–6.2)
SALMONELLA SP: NOT DETECTED
SERRATIA MARCESCENS: NOT DETECTED
SODIUM SERPL-SCNC: 134 MMOL/L (ref 136–145)
SODIUM SERPL-SCNC: 139 MMOL/L (ref 136–145)
SPECIMEN OUTDATE: NORMAL
STAPHYLOCOCCUS AUREUS: NOT DETECTED
STAPHYLOCOCCUS EPIDERMIDIS: NOT DETECTED
STAPHYLOCOCCUS LUGDUNESIS: NOT DETECTED
STAPHYLOCOCCUS SPECIES: NOT DETECTED
STENOTROPHOMONAS MALTOPHILIA: NOT DETECTED
STREPTOCOCCUS AGALACTIAE: NOT DETECTED
STREPTOCOCCUS PNEUMONIAE: NOT DETECTED
STREPTOCOCCUS PYOGENES: NOT DETECTED
STREPTOCOCCUS SPECIES: NOT DETECTED
TROPONIN I SERPL DL<=0.01 NG/ML-MCNC: 0.17 NG/ML (ref 0–0.03)
VAN A/B (VRE GENE): ABNORMAL
VIM GENE (CARBAPENEM RESISTANT): NOT DETECTED
WBC # BLD AUTO: 12.95 K/UL (ref 3.9–12.7)

## 2024-08-29 PROCEDURE — 25000003 PHARM REV CODE 250: Performed by: INTERNAL MEDICINE

## 2024-08-29 PROCEDURE — 25500020 PHARM REV CODE 255: Performed by: INTERNAL MEDICINE

## 2024-08-29 PROCEDURE — 83605 ASSAY OF LACTIC ACID: CPT | Mod: 91

## 2024-08-29 PROCEDURE — 25000003 PHARM REV CODE 250: Performed by: NURSE PRACTITIONER

## 2024-08-29 PROCEDURE — 27201674 HC SPHINCTERTOME: Performed by: INTERNAL MEDICINE

## 2024-08-29 PROCEDURE — 99900035 HC TECH TIME PER 15 MIN (STAT)

## 2024-08-29 PROCEDURE — 86850 RBC ANTIBODY SCREEN: CPT | Performed by: NURSE PRACTITIONER

## 2024-08-29 PROCEDURE — 74328 X-RAY BILE DUCT ENDOSCOPY: CPT | Performed by: INTERNAL MEDICINE

## 2024-08-29 PROCEDURE — 43264 ERCP REMOVE DUCT CALCULI: CPT | Performed by: INTERNAL MEDICINE

## 2024-08-29 PROCEDURE — 80048 BASIC METABOLIC PNL TOTAL CA: CPT | Mod: XB | Performed by: INTERNAL MEDICINE

## 2024-08-29 PROCEDURE — 63600175 PHARM REV CODE 636 W HCPCS: Performed by: NURSE PRACTITIONER

## 2024-08-29 PROCEDURE — 25000242 PHARM REV CODE 250 ALT 637 W/ HCPCS: Performed by: NURSE ANESTHETIST, CERTIFIED REGISTERED

## 2024-08-29 PROCEDURE — C2617 STENT, NON-COR, TEM W/O DEL: HCPCS | Performed by: INTERNAL MEDICINE

## 2024-08-29 PROCEDURE — 87040 BLOOD CULTURE FOR BACTERIA: CPT | Performed by: NURSE PRACTITIONER

## 2024-08-29 PROCEDURE — 80053 COMPREHEN METABOLIC PANEL: CPT

## 2024-08-29 PROCEDURE — 63600175 PHARM REV CODE 636 W HCPCS: Performed by: INTERNAL MEDICINE

## 2024-08-29 PROCEDURE — 27202127 HC STENT INTRODUCER: Performed by: INTERNAL MEDICINE

## 2024-08-29 PROCEDURE — 83735 ASSAY OF MAGNESIUM: CPT | Mod: 91 | Performed by: INTERNAL MEDICINE

## 2024-08-29 PROCEDURE — 83735 ASSAY OF MAGNESIUM: CPT

## 2024-08-29 PROCEDURE — 84100 ASSAY OF PHOSPHORUS: CPT

## 2024-08-29 PROCEDURE — 25000003 PHARM REV CODE 250: Performed by: NURSE ANESTHETIST, CERTIFIED REGISTERED

## 2024-08-29 PROCEDURE — 99291 CRITICAL CARE FIRST HOUR: CPT | Mod: ,,, | Performed by: NURSE PRACTITIONER

## 2024-08-29 PROCEDURE — 94761 N-INVAS EAR/PLS OXIMETRY MLT: CPT

## 2024-08-29 PROCEDURE — 63600175 PHARM REV CODE 636 W HCPCS: Performed by: NURSE ANESTHETIST, CERTIFIED REGISTERED

## 2024-08-29 PROCEDURE — 37000008 HC ANESTHESIA 1ST 15 MINUTES: Performed by: INTERNAL MEDICINE

## 2024-08-29 PROCEDURE — 84484 ASSAY OF TROPONIN QUANT: CPT | Performed by: NURSE PRACTITIONER

## 2024-08-29 PROCEDURE — 43274 ERCP DUCT STENT PLACEMENT: CPT | Performed by: INTERNAL MEDICINE

## 2024-08-29 PROCEDURE — 27000221 HC OXYGEN, UP TO 24 HOURS

## 2024-08-29 PROCEDURE — C1769 GUIDE WIRE: HCPCS | Performed by: INTERNAL MEDICINE

## 2024-08-29 PROCEDURE — 0FC98ZZ EXTIRPATION OF MATTER FROM COMMON BILE DUCT, VIA NATURAL OR ARTIFICIAL OPENING ENDOSCOPIC: ICD-10-PCS | Performed by: INTERNAL MEDICINE

## 2024-08-29 PROCEDURE — 74328 X-RAY BILE DUCT ENDOSCOPY: CPT | Mod: 26,,, | Performed by: INTERNAL MEDICINE

## 2024-08-29 PROCEDURE — 99223 1ST HOSP IP/OBS HIGH 75: CPT | Mod: 25,GC,, | Performed by: INTERNAL MEDICINE

## 2024-08-29 PROCEDURE — 43264 ERCP REMOVE DUCT CALCULI: CPT | Mod: 51,,, | Performed by: INTERNAL MEDICINE

## 2024-08-29 PROCEDURE — 21400001 HC TELEMETRY ROOM

## 2024-08-29 PROCEDURE — 83605 ASSAY OF LACTIC ACID: CPT

## 2024-08-29 PROCEDURE — 37000009 HC ANESTHESIA EA ADD 15 MINS: Performed by: INTERNAL MEDICINE

## 2024-08-29 PROCEDURE — 63600175 PHARM REV CODE 636 W HCPCS

## 2024-08-29 PROCEDURE — 85025 COMPLETE CBC W/AUTO DIFF WBC: CPT

## 2024-08-29 PROCEDURE — 0F798DZ DILATION OF COMMON BILE DUCT WITH INTRALUMINAL DEVICE, VIA NATURAL OR ARTIFICIAL OPENING ENDOSCOPIC: ICD-10-PCS | Performed by: INTERNAL MEDICINE

## 2024-08-29 PROCEDURE — 43274 ERCP DUCT STENT PLACEMENT: CPT | Mod: ,,, | Performed by: INTERNAL MEDICINE

## 2024-08-29 PROCEDURE — 27202125 HC BALLOON, EXTRACTION (ANY): Performed by: INTERNAL MEDICINE

## 2024-08-29 DEVICE — BILIARY STENT
Type: IMPLANTABLE DEVICE | Site: BILE DUCT | Status: FUNCTIONAL
Brand: ADVANIX™ BILIARY

## 2024-08-29 RX ORDER — SUCCINYLCHOLINE CHLORIDE 20 MG/ML
INJECTION INTRAMUSCULAR; INTRAVENOUS
Status: DISCONTINUED | OUTPATIENT
Start: 2024-08-29 | End: 2024-08-29

## 2024-08-29 RX ORDER — ALLOPURINOL 100 MG/1
100 TABLET ORAL DAILY
Status: DISCONTINUED | OUTPATIENT
Start: 2024-08-29 | End: 2024-08-29

## 2024-08-29 RX ORDER — ALBUTEROL SULFATE 90 UG/1
INHALANT RESPIRATORY (INHALATION)
Status: DISCONTINUED | OUTPATIENT
Start: 2024-08-29 | End: 2024-08-29

## 2024-08-29 RX ORDER — OXYCODONE HYDROCHLORIDE 5 MG/1
5 TABLET ORAL
Status: CANCELLED | OUTPATIENT
Start: 2024-08-29

## 2024-08-29 RX ORDER — MAGNESIUM SULFATE HEPTAHYDRATE 40 MG/ML
2 INJECTION, SOLUTION INTRAVENOUS ONCE
Status: COMPLETED | OUTPATIENT
Start: 2024-08-29 | End: 2024-08-29

## 2024-08-29 RX ORDER — FLUDROCORTISONE ACETATE 0.1 MG/1
100 TABLET ORAL EVERY MORNING
Status: DISCONTINUED | OUTPATIENT
Start: 2024-08-29 | End: 2024-09-12 | Stop reason: HOSPADM

## 2024-08-29 RX ORDER — INDOMETHACIN 50 MG/1
SUPPOSITORY RECTAL
Status: COMPLETED | OUTPATIENT
Start: 2024-08-29 | End: 2024-08-29

## 2024-08-29 RX ORDER — HALOPERIDOL 5 MG/ML
0.5 INJECTION INTRAMUSCULAR EVERY 10 MIN PRN
Status: CANCELLED | OUTPATIENT
Start: 2024-08-29

## 2024-08-29 RX ORDER — DEXAMETHASONE SODIUM PHOSPHATE 4 MG/ML
INJECTION, SOLUTION INTRA-ARTICULAR; INTRALESIONAL; INTRAMUSCULAR; INTRAVENOUS; SOFT TISSUE
Status: DISCONTINUED | OUTPATIENT
Start: 2024-08-29 | End: 2024-08-29

## 2024-08-29 RX ORDER — ROCURONIUM BROMIDE 10 MG/ML
INJECTION, SOLUTION INTRAVENOUS
Status: DISCONTINUED | OUTPATIENT
Start: 2024-08-29 | End: 2024-08-29

## 2024-08-29 RX ORDER — GLUCAGON 1 MG
1 KIT INJECTION
Status: CANCELLED | OUTPATIENT
Start: 2024-08-29

## 2024-08-29 RX ORDER — ONDANSETRON HYDROCHLORIDE 2 MG/ML
4 INJECTION, SOLUTION INTRAVENOUS DAILY PRN
Status: CANCELLED | OUTPATIENT
Start: 2024-08-29

## 2024-08-29 RX ORDER — PHENYLEPHRINE HYDROCHLORIDE 10 MG/ML
INJECTION INTRAVENOUS
Status: DISCONTINUED | OUTPATIENT
Start: 2024-08-29 | End: 2024-08-29

## 2024-08-29 RX ORDER — ONDANSETRON HYDROCHLORIDE 2 MG/ML
INJECTION, SOLUTION INTRAVENOUS
Status: DISCONTINUED | OUTPATIENT
Start: 2024-08-29 | End: 2024-08-29

## 2024-08-29 RX ORDER — GLUCAGON 1 MG
1 KIT INJECTION
Status: DISCONTINUED | OUTPATIENT
Start: 2024-08-29 | End: 2024-09-03

## 2024-08-29 RX ORDER — TAMSULOSIN HYDROCHLORIDE 0.4 MG/1
0.4 CAPSULE ORAL DAILY
Status: DISCONTINUED | OUTPATIENT
Start: 2024-08-29 | End: 2024-09-11

## 2024-08-29 RX ORDER — HYDROMORPHONE HYDROCHLORIDE 1 MG/ML
0.2 INJECTION, SOLUTION INTRAMUSCULAR; INTRAVENOUS; SUBCUTANEOUS EVERY 5 MIN PRN
Status: CANCELLED | OUTPATIENT
Start: 2024-08-29

## 2024-08-29 RX ORDER — LIDOCAINE HYDROCHLORIDE 20 MG/ML
INJECTION, SOLUTION EPIDURAL; INFILTRATION; INTRACAUDAL; PERINEURAL
Status: DISCONTINUED | OUTPATIENT
Start: 2024-08-29 | End: 2024-08-29

## 2024-08-29 RX ORDER — PROPOFOL 10 MG/ML
VIAL (ML) INTRAVENOUS
Status: DISCONTINUED | OUTPATIENT
Start: 2024-08-29 | End: 2024-08-29

## 2024-08-29 RX ORDER — INSULIN ASPART 100 [IU]/ML
0-10 INJECTION, SOLUTION INTRAVENOUS; SUBCUTANEOUS EVERY 6 HOURS PRN
Status: DISCONTINUED | OUTPATIENT
Start: 2024-08-29 | End: 2024-09-03

## 2024-08-29 RX ORDER — FENTANYL CITRATE 50 UG/ML
INJECTION, SOLUTION INTRAMUSCULAR; INTRAVENOUS
Status: DISCONTINUED | OUTPATIENT
Start: 2024-08-29 | End: 2024-08-29

## 2024-08-29 RX ORDER — FINASTERIDE 5 MG/1
5 TABLET, FILM COATED ORAL DAILY
Status: DISCONTINUED | OUTPATIENT
Start: 2024-08-29 | End: 2024-09-12 | Stop reason: HOSPADM

## 2024-08-29 RX ADMIN — HEPARIN SODIUM 5000 UNITS: 5000 INJECTION INTRAVENOUS; SUBCUTANEOUS at 01:08

## 2024-08-29 RX ADMIN — FLUDROCORTISONE ACETATE 100 MCG: 0.1 TABLET ORAL at 06:08

## 2024-08-29 RX ADMIN — ONDANSETRON 4 MG: 2 INJECTION INTRAMUSCULAR; INTRAVENOUS at 10:08

## 2024-08-29 RX ADMIN — MAGNESIUM SULFATE HEPTAHYDRATE 2 G: 40 INJECTION, SOLUTION INTRAVENOUS at 04:08

## 2024-08-29 RX ADMIN — FENTANYL CITRATE 25 MCG: 50 INJECTION, SOLUTION INTRAMUSCULAR; INTRAVENOUS at 09:08

## 2024-08-29 RX ADMIN — SUGAMMADEX 200 MG: 100 INJECTION, SOLUTION INTRAVENOUS at 10:08

## 2024-08-29 RX ADMIN — FENTANYL CITRATE 25 MCG: 50 INJECTION, SOLUTION INTRAMUSCULAR; INTRAVENOUS at 10:08

## 2024-08-29 RX ADMIN — PIPERACILLIN SODIUM AND TAZOBACTAM SODIUM 4.5 G: 4; .5 INJECTION, POWDER, FOR SOLUTION INTRAVENOUS at 12:08

## 2024-08-29 RX ADMIN — HEPARIN SODIUM 5000 UNITS: 5000 INJECTION INTRAVENOUS; SUBCUTANEOUS at 11:08

## 2024-08-29 RX ADMIN — SUCCINYLCHOLINE CHLORIDE 180 MG: 20 INJECTION, SOLUTION INTRAMUSCULAR; INTRAVENOUS at 09:08

## 2024-08-29 RX ADMIN — PIPERACILLIN SODIUM AND TAZOBACTAM SODIUM 4.5 G: 4; .5 INJECTION, POWDER, FOR SOLUTION INTRAVENOUS at 01:08

## 2024-08-29 RX ADMIN — SODIUM CHLORIDE: 0.9 INJECTION, SOLUTION INTRAVENOUS at 09:08

## 2024-08-29 RX ADMIN — DEXAMETHASONE SODIUM PHOSPHATE 4 MG: 4 INJECTION, SOLUTION INTRAMUSCULAR; INTRAVENOUS at 10:08

## 2024-08-29 RX ADMIN — PHENYLEPHRINE HYDROCHLORIDE 100 MCG: 10 INJECTION INTRAVENOUS at 10:08

## 2024-08-29 RX ADMIN — HEPARIN SODIUM 5000 UNITS: 5000 INJECTION INTRAVENOUS; SUBCUTANEOUS at 06:08

## 2024-08-29 RX ADMIN — LIDOCAINE HYDROCHLORIDE 80 MG: 20 INJECTION, SOLUTION EPIDURAL; INFILTRATION; INTRACAUDAL; PERINEURAL at 09:08

## 2024-08-29 RX ADMIN — VANCOMYCIN HYDROCHLORIDE 1250 MG: 1.25 INJECTION, POWDER, LYOPHILIZED, FOR SOLUTION INTRAVENOUS at 12:08

## 2024-08-29 RX ADMIN — ROCURONIUM BROMIDE 30 MG: 10 INJECTION INTRAVENOUS at 10:08

## 2024-08-29 RX ADMIN — PROPOFOL 150 MG: 10 INJECTION, EMULSION INTRAVENOUS at 09:08

## 2024-08-29 RX ADMIN — ALBUTEROL SULFATE 6 PUFF: 108 AEROSOL, METERED RESPIRATORY (INHALATION) at 10:08

## 2024-08-29 RX ADMIN — PROPOFOL 50 MG: 10 INJECTION, EMULSION INTRAVENOUS at 10:08

## 2024-08-29 NOTE — CONSULTS
[facility]  Advanced Endoscopy Service  Consult Note    Patient Name: Josh Pinto  MRN: [id number]  Admission Date: 8/28/2024  Hospital Length of Stay: 1 days  Code Status: Full Code   Attending Provider: Keyon Viera MD   Consulting Provider: Desire Bal MD  Primary Care Physician: Hali Burrell MD  Principal Problem:Cholangitis    Inpatient consult to Advanced Endoscopy Service (AES)  Consult performed by: Desire Bal MD  Consult ordered by: Scott Bee NP        Subjective:     HPI: Josh Pinto is a 84 y.o. male with history of BPH with urinary obstruction and multiple UTIs, SBP s/p hernia repair, HTN, CKD, gout, AF (not on anticoag), hx Cdiff, and hx RCC s/p left nephrectomy, who is transferred to [facility] from [facility] for choledocholithiasis and concern for acute cholangitis. He presented to the outside hospital ED with nausea, vomiting, fatigue, poor PO,  dyspnea. He was hypoxic but quickly recovered with IV steroids and neb treatments and is now on room air. His initial labs were significant for elevated LFTs with Tbili 7.3, , , , lipase 812. CT A/P showed intra and extra hepatic ductal dilatation, gallstones, normal panc and panc duct, among other findings. F/U U/S showed CBD up to 14 mm, numerous gallstones, gb wall thickening but no pericholecystic fluid. He was transferred to [facility] for AES eval with ERCP.     Today, he is fluid resuscitated, vitally stable, and clinically doing well. WBC jumped but he did receive IV steroids yesterday. He is agreeable to procedure today and has been NPO since midnight. No anticoag or antiplatelets.    Past Medical History:   Diagnosis Date    Allergy     Arthritis     Cataract     Hypertension        Past Surgical History:   Procedure Laterality Date    BLADDER FULGURATION N/A 8/19/2023    Procedure: FULGURATION, BLADDER;  Surgeon: Abdulaziz Garcia MD;  Location: [facility];  Service:  Urology;  Laterality: N/A;    CYSTOSCOPY N/A 8/19/2023    Procedure: CYSTOSCOPY;  Surgeon: Abdulaziz Garcia MD;  Location: [facility] OR;  Service: Urology;  Laterality: N/A;    ESOPHAGOGASTRODUODENOSCOPY N/A 9/20/2023    Procedure: EGD (ESOPHAGOGASTRODUODENOSCOPY);  Surgeon: Varinder Melo MD;  Location: [facility] ENDO;  Service: Gastroenterology;  Laterality: N/A;    HERNIA REPAIR N/A 9/3/2023    Procedure: REPAIR, HERNIA INTERNAL;  Surgeon: Andrew Juares MD;  Location: [facility] OR;  Service: General;  Laterality: N/A;    INSERTION OF CATHETER N/A 8/19/2023    Procedure: INSERTION, CATHETER;  Surgeon: Abdulaziz Garcia MD;  Location: [facility] OR;  Service: Urology;  Laterality: N/A;    knee surgery      right knee    LAPAROSCOPIC ROBOT-ASSISTED SURGICAL REMOVAL OF KIDNEY USING DA EVIN XI Left 8/20/2023    Procedure: XI ROBOTIC NEPHRECTOMY;  Surgeon: Abdulaziz Garcia MD;  Location: [facility] OR;  Service: Urology;  Laterality: Left;    LAPAROTOMY, EXPLORATORY N/A 9/3/2023    Procedure: LAPAROTOMY, EXPLORATORY;  Surgeon: Andrew Juares MD;  Location: [facility] OR;  Service: General;  Laterality: N/A;       Family History   Problem Relation Name Age of Onset    Heart disease Mother      Cancer Paternal Grandmother          stroke    Cancer Maternal Grandmother          lung cancer    Cancer Maternal Grandfather          brain cancer       Social History     Socioeconomic History    Marital status: Single   Tobacco Use    Smoking status: Former     Passive exposure: Past    Smokeless tobacco: Never   Substance and Sexual Activity    Alcohol use: No    Drug use: No    Sexual activity: Yes     Partners: Female     Social Determinants of Health     Financial Resource Strain: Low Risk  (9/4/2023)    Overall Financial Resource Strain (CARDIA)     Difficulty of Paying Living Expenses: Not hard at all   Food Insecurity: No Food Insecurity (9/4/2023)    Hunger Vital Sign     Worried About Running Out of Food in the Last Year: Never  true     Ran Out of Food in the Last Year: Never true   Transportation Needs: Unknown (9/4/2023)    PRAPARE - Transportation     Lack of Transportation (Non-Medical): No   Physical Activity: Inactive (9/4/2023)    Exercise Vital Sign     Days of Exercise per Week: 0 days     Minutes of Exercise per Session: 0 min   Stress: Stress Concern Present (9/4/2023)    [employer] - Occupational Stress Questionnaire     Feeling of Stress : Rather much   Housing Stability: Low Risk  (9/4/2023)    Housing Stability Vital Sign     Unable to Pay for Housing in the Last Year: No     Number of Places Lived in the Last Year: 1     Unstable Housing in the Last Year: No       Current Facility-Administered Medications on File Prior to Encounter   Medication Dose Route Frequency Provider Last Rate Last Admin    [COMPLETED] albuterol-ipratropium 2.5 mg-0.5 mg/3 mL nebulizer solution 3 mL  3 mL Nebulization ED 1 Time Flex Huffman MD   3 mL at 08/28/24 1115    [COMPLETED] lactated ringers bolus 1,000 mL  1,000 mL Intravenous ED 1 Time Flex Huffman MD   Stopped at 08/28/24 1902    [COMPLETED] lactated ringers bolus 2,040 mL  30 mL/kg Intravenous ED 1 Time Flex Huffman MD   Stopped at 08/28/24 1712    [COMPLETED] methylPREDNISolone sodium succinate injection 125 mg  125 mg Intravenous ED 1 Time Flex Huffman MD   125 mg at 08/28/24 1120    [COMPLETED] piperacillin-tazobactam (ZOSYN) 4.5 g in D5W 100 mL IVPB (MB+)  4.5 g Intravenous ED 1 Time Flex Huffman MD   Stopped at 08/28/24 1554    [DISCONTINUED] NORepinephrine bitartrate-D5W 4 mg/250 mL (16 mcg/mL) infusion Soln             [DISCONTINUED] piperacillin-tazobactam (ZOSYN) 4.5 g in D5W 100 mL IVPB (MB+)  4.5 g Intravenous Q12H Flex Huffman MD         Current Outpatient Medications on File Prior to Encounter   Medication Sig Dispense Refill    allopurinoL (ZYLOPRIM) 100 MG tablet Take a half a tablet every other  day due to your chronic kidney disease 90 tablet 0    cholecalciferol, vitamin D3, (VITAMIN D3) 125 mcg (5,000 unit) Tab Take 5,000 Units by mouth once daily.      colchicine (COLCRYS) 0.6 mg tablet 1.2 mg at the first sign of  gout flare, followed in 1 hour with a single dose of 0.6 mg. repeat treatment should not occur for at least 14 days. Use with caution due to chronic kidney disease 30 tablet 5    finasteride (PROSCAR) 5 mg tablet Take 1 tablet (5 mg total) by mouth once daily. 90 tablet 3    fludrocortisone (FLORINEF) 0.1 mg Tab Take 100 mcg by mouth every morning.      iron-vitamin C 100-250 mg, ICAR-C, 100-250 mg Tab Take 1 tablet by mouth every morning.      magnesium oxide (MAG-OX) 250 mg magnesium Tab SMARTSI caplet By Mouth Every Morning      metoprolol tartrate (LOPRESSOR) 25 MG tablet Take 12.5 mg by mouth 2 (two) times daily.      pantoprazole (PROTONIX) 40 MG tablet Take 1 tablet (40 mg total) by mouth 2 (two) times daily before meals. (Patient taking differently: Take 40 mg by mouth once daily.) 60 tablet 11    potassium chloride SA (K-DUR,KLOR-CON) 20 MEQ tablet Take 20 mEq by mouth every morning.      tamsulosin (FLOMAX) 0.4 mg Cap Take 1 capsule (0.4 mg total) by mouth once daily. 90 capsule 2       Review of patient's allergies indicates:   Allergen Reactions    Sulfa (sulfonamide antibiotics) Rash       ROS     Objective:     Vitals:    24 0815   BP:    Pulse:    Resp:    Temp: 97.6 °F (36.4 °C)         Constitutional:  not in acute distress and well developed  HENT: Head: Normal, normocephalic, atraumatic.  Eyes: conjunctiva clear and sclera nonicteric  Respiratory: normal chest expansion & respiratory effort   and no accessory muscle use  GI: soft, non-tender, without masses or organomegaly  Skin: normal color and no jaundice  Neurological: alert, oriented x3      Significant Labs:  Recent Labs   Lab 24  1116 24  0318   HGB 14.2 11.4*       Lab Results   Component Value  Date    WBC 12.95 (H) 08/29/2024    HGB 11.4 (L) 08/29/2024    HCT 34.4 (L) 08/29/2024    MCV 94 08/29/2024     (L) 08/29/2024       Lab Results   Component Value Date     08/29/2024    K 4.1 08/29/2024     08/29/2024    CO2 19 (L) 08/29/2024    BUN 48 (H) 08/29/2024    CREATININE 3.1 (H) 08/29/2024    CALCIUM 8.7 08/29/2024    ANIONGAP 13 08/29/2024    ESTGFRAFRICA 52.6 (A) 08/18/2015    EGFRNONAA 45.5 (A) 08/18/2015       Lab Results   Component Value Date     (H) 08/29/2024     (H) 08/29/2024    ALKPHOS 238 (H) 08/29/2024    BILITOT 4.2 (H) 08/29/2024       Lab Results   Component Value Date    INR 1.1 08/28/2024    INR 1.5 09/03/2023    INR 1.1 08/16/2023       Significant Imaging:  Reviewed pertinent radiology findings.       Assessment/Plan:     Josh Pinto is a 84 y.o. male with history of BPH with urinary obstruction and multiple UTIs, SBP s/p hernia repair, HTN, CKD, gout, AF (not on anticoag), hx Cdiff, and hx RCC s/p left nephrectomy, who is transferred to [facility] from [facility] for choledocholithiasis and concern for acute cholangitis. AES consulted for ERCP.    Problem List:  Choledocholithiasis with concern for acute cholangitis  Cholelithiasis without cholecystitis      Recommendations:  - NPO for ERCP today  - antibiotics per primary team  - recommend general surgery consult       Thank you for involving us in the care of Josh Pinto. Please call with any additional questions, concerns or changes in the patient's clinical status. We will continue to follow.     Desire Bal MD  Gastroenterology and Hepatology Fellow, PGY-4

## 2024-08-29 NOTE — PROGRESS NOTES
Pharmacist Renal Dose Adjustment Note    Josh Pinto is a 84 y.o. male being treated with the medication piperacillin-tazobactam    Patient Data:    Vital Signs (Most Recent):  Temp: 98.8 °F (37.1 °C) (08/28/24 2340)  Pulse: (!) 53 (08/28/24 2340)  Resp: (!) 22 (08/28/24 2340)  BP: 109/60 (08/28/24 2340)  SpO2: (!) 94 % (08/28/24 2340) Vital Signs (72h Range):  Temp:  [98.4 °F (36.9 °C)-98.8 °F (37.1 °C)]   Pulse:  []   Resp:  [19-26]   BP: ()/(55-92)   SpO2:  [91 %-98 %]      Recent Labs   Lab 08/28/24  1116 08/28/24  1851   CREATININE 3.1* 3.0*     Serum creatinine: 3 mg/dL (H) 08/28/24 1851  Estimated creatinine clearance: 18.3 mL/min (A)    Medication: piperacillin-tazobactam 4.5 grams every 8 hours will be changed to piperacillin-tazobactam 4.5 grams every 12 hours for CrCl < 20 mL/min    Sonia OwensD

## 2024-08-29 NOTE — PLAN OF CARE
MICU DAILY GOALS     Family/Goals of care/Code Status   Code Status: Full Code    24H Vital Sign Range  Temp:  [97.6 °F (36.4 °C)-98.8 °F (37.1 °C)]   Pulse:  [41-86]   Resp:  [14-29]   BP: ()/(52-92)   SpO2:  [92 %-98 %]      Shift Events (include procedures and significant events)   ERCP completed today and patient recovered without issues. Step down orders placed, awaiting bed. Plan for lapchole tomorrow, 8/30, NPO after midnight tonight, patient aware.    AWAKE RASS: Goal - RASS Goal: 0-->alert and calm  Actual - RASS (Lee Agitation-Sedation Scale): drowsy    Restraint necessity: Not necessary   BREATHE SBT: Not intubated    Coordinate A & B, analgesics/sedatives Pain: managed   SAT: Not intubated   Delirium CAM-ICU: Overall CAM-ICU: Negative   Early(intubated/ Progressive (non-intubated) Mobility MOVE Screen (INTUBATED ONLY): Not intubated    Activity: Activity Management: Ambulated to bathroom - L4   Feeding/Nutrition Diet order: Diet/Nutrition Received: clear liquid,     Thrombus DVT prophylaxis: VTE Required Core Measure: Pharmacological prophylaxis initiated/maintained   HOB Elevation Head of Bed (HOB) Positioning: HOB at 30-45 degrees   Ulcer Prophylaxis GI: no   Glucose control managed     Skin Skin assessed during: Daily Assessment    Sacrum intact/not altered? Yes  Heels intact/not altered? Yes  Surgical wound? No    CHECK ONE!   (no altered skin or altered skin) and sub boxes:  [x] No Altered Skin Integrity Present    [x]Prevention Measures Documented    [] Altered Skin Integrity Present or Discovered   [] LDA present in EPIC, daily doc completed              [] LDA added if not in EPIC (describe wound).                    When describing wound, do not stage, use descriptive words only.    [] Wound Image Taken (required on admit,                   transfer/discharge and every Tuesday)    Wound Care Consulted? No    4 EYES:  Attending Nurse (1st set of eyes): Sonja Loja RN/Staff Member  Pt. complaining of worsening RUQ abdominal pain that began 2 weeks ago.  Pt. states pain is now radiating to left side.  Pt. denies fever/chills/v/d/sob.  Pt. states when symptoms first began she had nausea that has now relieved. (2nd set of eyes): Brad   Bowel Function no issues    Indwelling Catheter Necessity         NA   De-escalation Antibiotics No        VS and assessment per flow sheet, patient progressing towards goals as tolerated, plan of care reviewed with Mr. Moreno, all concerns addressed, will continue to monitor.

## 2024-08-29 NOTE — CARE UPDATE
Underwent ERCP w/ CBD stone removal and placement of CBD stent. Gen surg consulted for cholecystectomy consideration. HDS.     Ready for stepdown.

## 2024-08-29 NOTE — HPI
"Mr. Pinto is an 84 year old male with PMH notable for BPH with urinary obstruction resulting in multiple UTIs, SBP s/p hernia repair, HTN, CKD, gout, AF (not on A/C), prior Cdiff, and RCC s/p nephrectomy who presented to [facility] ED on 08/28 with dyspnea, nausea, vomiting, poor PO intake, and fatigue. Per chart review, EMS notes initial oxygen saturation in the 50s therefore he was placed on 15L NRB, however, weaned quickly to NC in th ED. Initial work up with elevated lactic, lipase, and transaminases. Imaging studies performed. Abdominal ultrasound noted gallstones with findings concerning for acute cholecystitis.  "There are numerous mobile gallstones.  Gallbladder wall is mildly thickened measuring 4 mm.  No pericholecystic fluid is present.  Common duct is dilated measuring 14 mm.  Moderate intrahepatic biliary ductal dilation." CT abdomen and pelvis without contrast noted intra and extrahepatic biliary ductal dilatation of uncertain etiology  Massive enlargement of the prostate similar to previous study.  Previous left nephrectomy.  Stable rim calcified structure at the midpole of the right kidney.  There are distal colonic diverticula.  No evidence of acute diverticulitis.  No bowel obstruction no inflammation bowel.  Appendix is present.  No ascites."     In the ED he as given IV steroids, nebulizer treatments, IVF and Zosyn. His lactic acid peaked at 9.9. Given this, transfer was requested to [facility] for [facility] consultation.   "

## 2024-08-29 NOTE — PROGRESS NOTES
"Pharmacokinetic Initial Assessment: IV Vancomycin    Assessment/Plan:    Initiate intravenous vancomycin with loading dose of 1250 mg once with subsequent doses when random concentrations are less than 20 mcg/mL  Desired empiric serum trough concentration is 15 to 20 mcg/mL  Draw vancomycin random level on 8/30 at 0030.  Pharmacy will continue to follow and monitor vancomycin.      Please contact pharmacy at extension [phone] with any questions regarding this assessment.     Thank you for the consult,   Skyla Lara       Patient brief summary:  Josh Pinto is a 84 y.o. male initiated on antimicrobial therapy with IV Vancomycin for treatment of suspected bacteremia    Drug Allergies:   Review of patient's allergies indicates:   Allergen Reactions    Sulfa (sulfonamide antibiotics) Rash       Actual Body Weight:   68 kg    Renal Function:   Estimated Creatinine Clearance: 18.3 mL/min (A) (based on SCr of 3 mg/dL (H)).    Dialysis Method (if applicable):  N/A    CBC (last 72 hours):  Recent Labs   Lab Result Units 08/28/24  1116   WBC K/uL 6.97   Hemoglobin g/dL 14.2   Hematocrit % 42.9   Platelets K/uL 151   Gran % % 96.0*   Lymph % % 1.1*   Mono % % 2.3*   Eosinophil % % 0.0   Basophil % % 0.0   Differential Method  Automated       Metabolic Panel (last 72 hours):  Recent Labs   Lab Result Units 08/28/24  1116 08/28/24  1121 08/28/24  1851   Sodium mmol/L 143  --  139   Potassium mmol/L 4.5  --  4.2   Chloride mmol/L 111*  --  106   CO2 mmol/L 16*  --  18*   Glucose mg/dL 141*  --  135*   Glucose, UA   --  Negative  --    BUN mg/dL 48*  --  44*   Creatinine mg/dL 3.1*  --  3.0*   Albumin g/dL 3.3*  --   --    Total Bilirubin mg/dL 7.3*  --   --    Alkaline Phosphatase U/L 420*  --   --    AST U/L 268*  --   --    ALT U/L 366*  --   --        Drug levels (last 3 results):  No results for input(s): "VANCOMYCINRA", "VANCORANDOM", "VANCOMYCINPE", "VANCOPEAK", "VANCOMYCINTR", "VANCOTROUGH" in the last 72 " hours.    Microbiologic Results:  Microbiology Results (last 7 days)       ** No results found for the last 168 hours. **

## 2024-08-29 NOTE — HPI
Josh Pinto is a 84 y.o. gentleman with PMH of BPH with urinary obstruction, HTN, CKD (baseline creatinine 3), gout, and RCC s/p left nephrectomy complicated by an internal hernia requiring exploratory laparotomy in 2023 who is admitted to the MICU with cholangitis. He initially presented with nausea, vomiting, and fatigue. Initial Tbili was 7.3 with elevated liver enzymes. Imaging findings were concerning for common bile duct obstruction. ERCP was performed 8/29 which found choledocholithiasis and a stent was left in place. General surgery consulted for evaluation. Tolerates >4 METS. Not on anticoagulation (afib last year when recovering from his nephrectomy).

## 2024-08-29 NOTE — SUBJECTIVE & OBJECTIVE
Current Facility-Administered Medications on File Prior to Encounter   Medication    [COMPLETED] lactated ringers bolus 1,000 mL    [COMPLETED] lactated ringers bolus 2,040 mL    [COMPLETED] piperacillin-tazobactam (ZOSYN) 4.5 g in D5W 100 mL IVPB (MB+)    [DISCONTINUED] NORepinephrine bitartrate-D5W 4 mg/250 mL (16 mcg/mL) infusion Soln    [DISCONTINUED] piperacillin-tazobactam (ZOSYN) 4.5 g in D5W 100 mL IVPB (MB+)     Current Outpatient Medications on File Prior to Encounter   Medication Sig    allopurinoL (ZYLOPRIM) 100 MG tablet Take a half a tablet every other day due to your chronic kidney disease    cholecalciferol, vitamin D3, (VITAMIN D3) 125 mcg (5,000 unit) Tab Take 5,000 Units by mouth once daily.    colchicine (COLCRYS) 0.6 mg tablet 1.2 mg at the first sign of  gout flare, followed in 1 hour with a single dose of 0.6 mg. repeat treatment should not occur for at least 14 days. Use with caution due to chronic kidney disease    finasteride (PROSCAR) 5 mg tablet Take 1 tablet (5 mg total) by mouth once daily.    fludrocortisone (FLORINEF) 0.1 mg Tab Take 100 mcg by mouth every morning.    iron-vitamin C 100-250 mg, ICAR-C, 100-250 mg Tab Take 1 tablet by mouth every morning.    magnesium oxide (MAG-OX) 250 mg magnesium Tab SMARTSI caplet By Mouth Every Morning    metoprolol tartrate (LOPRESSOR) 25 MG tablet Take 12.5 mg by mouth 2 (two) times daily.    pantoprazole (PROTONIX) 40 MG tablet Take 1 tablet (40 mg total) by mouth 2 (two) times daily before meals. (Patient taking differently: Take 40 mg by mouth once daily.)    potassium chloride SA (K-DUR,KLOR-CON) 20 MEQ tablet Take 20 mEq by mouth every morning.    tamsulosin (FLOMAX) 0.4 mg Cap Take 1 capsule (0.4 mg total) by mouth once daily.       Review of patient's allergies indicates:   Allergen Reactions    Sulfa (sulfonamide antibiotics) Rash       Past Medical History:   Diagnosis Date    Allergy     Arthritis     Cataract     Hypertension       Past Surgical History:   Procedure Laterality Date    BLADDER FULGURATION N/A 8/19/2023    Procedure: FULGURATION, BLADDER;  Surgeon: Abdulaziz Garcia MD;  Location: [facility] OR;  Service: Urology;  Laterality: N/A;    CYSTOSCOPY N/A 8/19/2023    Procedure: CYSTOSCOPY;  Surgeon: Abdulaziz Garcia MD;  Location: [facility];  Service: Urology;  Laterality: N/A;    ESOPHAGOGASTRODUODENOSCOPY N/A 9/20/2023    Procedure: EGD (ESOPHAGOGASTRODUODENOSCOPY);  Surgeon: Varinder Melo MD;  Location: [facility];  Service: Gastroenterology;  Laterality: N/A;    HERNIA REPAIR N/A 9/3/2023    Procedure: REPAIR, HERNIA INTERNAL;  Surgeon: Andrew Juares MD;  Location: [facility];  Service: General;  Laterality: N/A;    INSERTION OF CATHETER N/A 8/19/2023    Procedure: INSERTION, CATHETER;  Surgeon: Abdulaziz Garcia MD;  Location: [facility];  Service: Urology;  Laterality: N/A;    knee surgery      right knee    LAPAROSCOPIC ROBOT-ASSISTED SURGICAL REMOVAL OF KIDNEY USING DA EVIN XI Left 8/20/2023    Procedure: XI ROBOTIC NEPHRECTOMY;  Surgeon: Abdulaziz Garcia MD;  Location: [facility];  Service: Urology;  Laterality: Left;    LAPAROTOMY, EXPLORATORY N/A 9/3/2023    Procedure: LAPAROTOMY, EXPLORATORY;  Surgeon: Andrew Juares MD;  Location: [facility];  Service: General;  Laterality: N/A;     Family History       Problem Relation (Age of Onset)    Cancer Paternal Grandmother, Maternal Grandmother, Maternal Grandfather    Heart disease Mother          Tobacco Use    Smoking status: Former     Passive exposure: Past    Smokeless tobacco: Never   Substance and Sexual Activity    Alcohol use: No    Drug use: No    Sexual activity: Yes     Partners: Female     Review of Systems   Constitutional:  Positive for fatigue. Negative for fever.   Respiratory:  Negative for shortness of breath and wheezing.    Cardiovascular:  Negative for chest pain and leg swelling.   Gastrointestinal:  Positive for abdominal pain, nausea and  vomiting. Negative for constipation and diarrhea.   Genitourinary:  Negative for decreased urine volume.   Musculoskeletal:  Negative for back pain and neck pain.   Neurological:  Negative for light-headedness and headaches.   Psychiatric/Behavioral:  Negative for agitation and confusion.      Objective:     Vital Signs (Most Recent):  Temp: 97.6 °F (36.4 °C) (08/29/24 0815)  Pulse: (!) 49 (08/29/24 1200)  Resp: 18 (08/29/24 1200)  BP: (!) 147/70 (08/29/24 1200)  SpO2: 95 % (08/29/24 1200) Vital Signs (24h Range):  Temp:  [97.6 °F (36.4 °C)-98.8 °F (37.1 °C)] 97.6 °F (36.4 °C)  Pulse:  [41-86] 49  Resp:  [15-29] 18  SpO2:  [92 %-97 %] 95 %  BP: ()/(52-92) 147/70     Weight: 68 kg (149 lb 14.6 oz)  Body mass index is 22.14 kg/m².     Physical Exam  Constitutional:       General: He is not in acute distress.     Appearance: Normal appearance.   HENT:      Head: Normocephalic and atraumatic.   Cardiovascular:      Rate and Rhythm: Normal rate and regular rhythm.   Pulmonary:      Effort: Pulmonary effort is normal. No respiratory distress.   Abdominal:      General: Abdomen is flat. There is no distension.      Palpations: Abdomen is soft.      Tenderness: There is no abdominal tenderness.      Comments: Well healed midline incision  Well healed robotic incisions   Neurological:      General: No focal deficit present.      Mental Status: He is alert and oriented to person, place, and time.   Psychiatric:         Behavior: Behavior normal.         Thought Content: Thought content normal.            I have reviewed all pertinent lab results within the past 24 hours.  CBC:   Recent Labs   Lab 08/29/24 0318   WBC 12.95*   RBC 3.67*   HGB 11.4*   HCT 34.4*   *   MCV 94   MCH 31.1*   MCHC 33.1     CMP:   Recent Labs   Lab 08/29/24 0318 08/29/24  0814   * 113*   CALCIUM 8.2* 8.7   ALBUMIN 2.3*  --    PROT 5.7*  --    * 139   K 4.1 4.1   CO2 18* 19*    107   BUN 44* 48*   CREATININE 3.0* 3.1*    ALKPHOS 238*  --    *  --    *  --    BILITOT 4.2*  --        Significant Diagnostics:  I have reviewed all pertinent imaging results/findings within the past 24 hours.

## 2024-08-29 NOTE — H&P
"  [facility] - Cardiac Medical [facility]  Critical Care Medicine  History & Physical    Patient Name: Josh Pinto  MRN: [id number]  Admission Date: 8/28/2024  Hospital Length of Stay: 1 days  Code Status: Full Code  Attending Physician: Keyon Viera MD   Primary Care Provider: Hali Burrell MD   Principal Problem: Cholangitis    Subjective:     HPI:  Mr. Pinto is an 84 year old male with PMH notable for BPH with urinary obstruction resulting in multiple UTIs, SBP s/p hernia repair, HTN, CKD, gout, AF (not on A/C), prior Cdiff, and RCC s/p nephrectomy who presented to [facility] ED on 08/28 with dyspnea, nausea, vomiting, poor PO intake, and fatigue. Per chart review, EMS notes initial oxygen saturation in the 50s therefore he was placed on 15L NRB, however, weaned quickly to NC in th ED. Initial work up with elevated lactic, lipase, and transaminases. Imaging studies performed. Abdominal ultrasound noted gallstones with findings concerning for acute cholecystitis.  "There are numerous mobile gallstones.  Gallbladder wall is mildly thickened measuring 4 mm.  No pericholecystic fluid is present.  Common duct is dilated measuring 14 mm.  Moderate intrahepatic biliary ductal dilation." CT abdomen and pelvis without contrast noted intra and extrahepatic biliary ductal dilatation of uncertain etiology  Massive enlargement of the prostate similar to previous study.  Previous left nephrectomy.  Stable rim calcified structure at the midpole of the right kidney.  There are distal colonic diverticula.  No evidence of acute diverticulitis.  No bowel obstruction no inflammation bowel.  Appendix is present.  No ascites."     In the ED he as given IV steroids, nebulizer treatments, IVF and Zosyn. His lactic acid peaked at 9.9. Given this, transfer was requested to [facility] for [facility] consultation.     Hospital/ICU Course:  No notes on file     Past Medical History:   Diagnosis Date    Allergy     Arthritis     " Cataract     Hypertension        Past Surgical History:   Procedure Laterality Date    BLADDER FULGURATION N/A 8/19/2023    Procedure: FULGURATION, BLADDER;  Surgeon: Abdulaziz Garcia MD;  Location: [facility] OR;  Service: Urology;  Laterality: N/A;    CYSTOSCOPY N/A 8/19/2023    Procedure: CYSTOSCOPY;  Surgeon: Abdulaziz Garcia MD;  Location: [facility];  Service: Urology;  Laterality: N/A;    ESOPHAGOGASTRODUODENOSCOPY N/A 9/20/2023    Procedure: EGD (ESOPHAGOGASTRODUODENOSCOPY);  Surgeon: Varinder Melo MD;  Location: [facility];  Service: Gastroenterology;  Laterality: N/A;    HERNIA REPAIR N/A 9/3/2023    Procedure: REPAIR, HERNIA INTERNAL;  Surgeon: Andrew Juares MD;  Location: [facility];  Service: General;  Laterality: N/A;    INSERTION OF CATHETER N/A 8/19/2023    Procedure: INSERTION, CATHETER;  Surgeon: Abdulaziz Garcia MD;  Location: [facility];  Service: Urology;  Laterality: N/A;    knee surgery      right knee    LAPAROSCOPIC ROBOT-ASSISTED SURGICAL REMOVAL OF KIDNEY USING DA EVIN XI Left 8/20/2023    Procedure: XI ROBOTIC NEPHRECTOMY;  Surgeon: Abdulaziz Garcia MD;  Location: [facility];  Service: Urology;  Laterality: Left;    LAPAROTOMY, EXPLORATORY N/A 9/3/2023    Procedure: LAPAROTOMY, EXPLORATORY;  Surgeon: Andrew Juares MD;  Location: [facility] OR;  Service: General;  Laterality: N/A;       Review of patient's allergies indicates:   Allergen Reactions    Sulfa (sulfonamide antibiotics) Rash       Family History       Problem Relation (Age of Onset)    Cancer Paternal Grandmother, Maternal Grandmother, Maternal Grandfather    Heart disease Mother          Tobacco Use    Smoking status: Former     Passive exposure: Past    Smokeless tobacco: Never   Substance and Sexual Activity    Alcohol use: No    Drug use: No    Sexual activity: Yes     Partners: Female      Review of Systems   Constitutional:  Positive for fatigue. Negative for fever.   Respiratory:  Negative for shortness of breath and  wheezing.    Cardiovascular:  Negative for chest pain and leg swelling.   Gastrointestinal:  Positive for abdominal pain, nausea and vomiting. Negative for constipation and diarrhea.   Genitourinary:  Negative for decreased urine volume.   Musculoskeletal:  Negative for back pain and neck pain.   Neurological:  Negative for light-headedness and headaches.   Psychiatric/Behavioral:  Negative for agitation and confusion.      Objective:     Vital Signs (Most Recent):  Temp: 98.8 °F (37.1 °C) (08/28/24 2340)  Pulse: (!) 55 (08/29/24 0000)  Resp: 19 (08/29/24 0000)  BP: (!) 100/56 (08/29/24 0000)  SpO2: (!) 94 % (08/29/24 0000) Vital Signs (24h Range):  Temp:  [98.4 °F (36.9 °C)-98.8 °F (37.1 °C)] 98.8 °F (37.1 °C)  Pulse:  [] 55  Resp:  [19-26] 19  SpO2:  [91 %-98 %] 94 %  BP: ()/(55-92) 100/56   Weight: 68 kg (149 lb 14.6 oz)  Body mass index is 22.14 kg/m².    No intake or output data in the 24 hours ending 08/29/24 0037       Physical Exam  Vitals and nursing note reviewed.   Constitutional:       General: He is not in acute distress.  HENT:      Head: Normocephalic and atraumatic.      Mouth/Throat:      Mouth: Mucous membranes are dry.      Pharynx: Oropharynx is clear. No oropharyngeal exudate.   Eyes:      General: No scleral icterus.     Pupils: Pupils are equal, round, and reactive to light.   Cardiovascular:      Rate and Rhythm: Normal rate and regular rhythm.      Pulses: Normal pulses.   Pulmonary:      Effort: Pulmonary effort is normal. No respiratory distress.   Abdominal:      General: Bowel sounds are normal.      Palpations: Abdomen is soft.      Tenderness: There is abdominal tenderness.   Musculoskeletal:      Right lower leg: No edema.      Left lower leg: No edema.   Skin:     General: Skin is dry.      Capillary Refill: Capillary refill takes less than 2 seconds.      Coloration: Skin is not jaundiced.   Neurological:      General: No focal deficit present.      Mental Status: He is  alert and oriented to person, place, and time. Mental status is at baseline.   Psychiatric:         Mood and Affect: Mood normal.            Vents:     Lines/Drains/Airways       Peripheral Intravenous Line  Duration                  Peripheral IV - Single Lumen 08/28/24 1109 20 G Left;Posterior Forearm <1 day         Peripheral IV - Single Lumen 08/28/24 1806 20 G Anterior;Left Antecubital <1 day                  Significant Labs:    CBC/Anemia Profile:  Recent Labs   Lab 08/28/24  1116   WBC 6.97   HGB 14.2   HCT 42.9      MCV 93   RDW 14.6*        Chemistries:  Recent Labs   Lab 08/28/24  1116 08/28/24  1851    139   K 4.5 4.2   * 106   CO2 16* 18*   BUN 48* 44*   CREATININE 3.1* 3.0*   CALCIUM 9.6 8.5*   ALBUMIN 3.3*  --    PROT 7.2  --    BILITOT 7.3*  --    ALKPHOS 420*  --    *  --    *  --        All pertinent labs within the past 24 hours have been reviewed.    Significant Imaging: I have reviewed all pertinent imaging results/findings within the past 24 hours.  Assessment/Plan:     Cardiac/Vascular  HTN (hypertension), dx 1985  -- Holding anti hypertensives at this time. Will resume as clinically indicated     Renal/  Stage 4 chronic kidney disease  Baseline Cr: 2.8-3  Cr at admission to ICU: 3   Estimated Creatinine Clearance: 18.3 mL/min (A) (based on SCr of 3 mg/dL (H)).    -- CMP daily and trend   -- Avoid nephrotoxins   -- Strict I&O   -- Renally dose all medications to appropriate GFR / CrCl   -- MAP > 65 and hgb > 7 goals     BPH with urinary obstruction  -- Continue Finasteride and Tamsulosin     ID  * Cholangitis  Abdominal ultrasound noted gallstones with findings concerning for acute cholecystitis.  There are numerous mobile gallstones.  Gallbladder wall is mildly thickened measuring 4 mm.  No pericholecystic fluid is present.  Common duct is dilated measuring 14 mm.  Moderate intrahepatic biliary ductal dilation.     CT abdomen and pelvis without contrast  noted intra and extrahepatic biliary ductal dilatation of uncertain etiology.  Follow-up MRCP would be helpful.  Suspect the presence of gallstones.  Massive enlargement of the prostate similar to previous study.  Previous left nephrectomy.  Stable rim calcified structure at the midpole of the right kidney.  There are distal colonic diverticula.  No evidence of acute diverticulitis.  No bowel obstruction no inflammation bowel.  Appendix is present.  No ascites.    -- AES consulted. Appreciate assistance   -- NPO   -- Continue broad spectrum abx and follow up infectious work up   -- Trending LFTs and renal function     Oncology  Renal cell carcinoma of left kidney, nephrectomy in 8/2023  -- Noted    Orthopedic  Chronic gout of multiple sites  -- Continue Allopurinol         Critical Care Daily Checklist:    A: Awake: RASS Goal/Actual Goal: RASS Goal: 0-->alert and calm  Actual:     B: Spontaneous Breathing Trial Performed?     C: SAT & SBT Coordinated?  N/A                      D: Delirium: CAM-ICU Overall CAM-ICU: Negative   E: Early Mobility Performed? Yes   F: Feeding Goal:    Status:     Current Diet Order   Procedures    Diet NPO      AS: Analgesia/Sedation PRN    T: Thromboembolic Prophylaxis Heparin    H: HOB > 300 Yes   U: Stress Ulcer Prophylaxis (if needed)    G: Glucose Control 140-180 goal    B: Bowel Function     I: Indwelling Catheter (Lines & Valdez) Necessity PIV   D: De-escalation of Antimicrobials/Pharmacotherapies Continue BSA    Plan for the day/ETD AES consulted. Trending labs. BSA and infectious work up     Code Status:  Family/Goals of Care: Full Code       Critical Care Time: 40 minutes  Critical secondary to Patient has a condition that poses threat to life and bodily function: cholangitis     Plan of care discussed with Dr. Sanchez. Attestation to follow     Critical care was time spent personally by me on the following activities: development of treatment plan with patient or surrogate and  bedside caregivers, discussions with consultants, evaluation of patient's response to treatment, examination of patient, ordering and performing treatments and interventions, ordering and review of laboratory studies, ordering and review of radiographic studies, pulse oximetry, re-evaluation of patient's condition. This critical care time did not overlap with that of any other provider or involve time for any procedures.     Scott Bee, NP  Critical Care Medicine  [facility] - Cardiac Medical ICU

## 2024-08-29 NOTE — TRANSFER OF CARE
"Anesthesia Transfer of Care Note    Patient: Josh Pinto    Procedure(s) Performed: Procedure(s) (LRB):  ERCP (ENDOSCOPIC RETROGRADE CHOLANGIOPANCREATOGRAPHY) (N/A)    Patient location: ICU    Anesthesia Type: general    Transport from OR: Transported from OR on 6-10 L/min O2 by face mask with adequate spontaneous ventilation    Post pain: adequate analgesia    Post assessment: no apparent anesthetic complications    Post vital signs: stable    Level of consciousness: awake and alert    Nausea/Vomiting: no nausea/vomiting    Complications: none    Transfer of care protocol was followed      Last vitals: Visit Vitals  BP (!) 105/59 (BP Location: Right arm, Patient Position: Lying)   Pulse (!) 48   Temp 36.4 °C (97.6 °F) (Oral)   Resp 15   Ht 5' 9" (1.753 m)   Wt 68 kg (149 lb 14.6 oz)   SpO2 95%   BMI 22.14 kg/m²     "

## 2024-08-29 NOTE — CONSULTS
[facility] - Cardiac Medical ICU  General Surgery  Consult Note    Patient Name: Josh Pinto  MRN: [id number]  Code Status: Full Code  Admission Date: 8/28/2024  Hospital Length of Stay: 1 days  Attending Physician: Keyon Viera MD  Primary Care Provider: Hali Burrell MD    Patient information was obtained from patient and past medical records.     Inpatient consult to General Surgery  Consult performed by: Adam Brasher MD  Consult ordered by: Keyon Viera MD        Subjective:     Principal Problem: Cholangitis    History of Present Illness: Josh Pinto is a 84 y.o. gentleman with PMH of BPH with urinary obstruction, HTN, CKD (baseline creatinine 3), gout, and RCC s/p left nephrectomy complicated by an internal hernia requiring exploratory laparotomy in 2023 who is admitted to the MICU with cholangitis. He initially presented with nausea, vomiting, and fatigue. Initial Tbili was 7.3 with elevated liver enzymes. Imaging findings were concerning for common bile duct obstruction. ERCP was performed 8/29 which found choledocholithiasis and a stent was left in place. General surgery consulted for evaluation. Tolerates >4 METS. Not on anticoagulation (afib last year when recovering from his nephrectomy).          Current Facility-Administered Medications on File Prior to Encounter   Medication    [COMPLETED] lactated ringers bolus 1,000 mL    [COMPLETED] lactated ringers bolus 2,040 mL    [COMPLETED] piperacillin-tazobactam (ZOSYN) 4.5 g in D5W 100 mL IVPB (MB+)    [DISCONTINUED] NORepinephrine bitartrate-D5W 4 mg/250 mL (16 mcg/mL) infusion Soln    [DISCONTINUED] piperacillin-tazobactam (ZOSYN) 4.5 g in D5W 100 mL IVPB (MB+)     Current Outpatient Medications on File Prior to Encounter   Medication Sig    allopurinoL (ZYLOPRIM) 100 MG tablet Take a half a tablet every other day due to your chronic kidney disease    cholecalciferol, vitamin D3, (VITAMIN D3) 125 mcg (5,000 unit) Tab Take 5,000  Units by mouth once daily.    colchicine (COLCRYS) 0.6 mg tablet 1.2 mg at the first sign of  gout flare, followed in 1 hour with a single dose of 0.6 mg. repeat treatment should not occur for at least 14 days. Use with caution due to chronic kidney disease    finasteride (PROSCAR) 5 mg tablet Take 1 tablet (5 mg total) by mouth once daily.    fludrocortisone (FLORINEF) 0.1 mg Tab Take 100 mcg by mouth every morning.    iron-vitamin C 100-250 mg, ICAR-C, 100-250 mg Tab Take 1 tablet by mouth every morning.    magnesium oxide (MAG-OX) 250 mg magnesium Tab SMARTSI caplet By Mouth Every Morning    metoprolol tartrate (LOPRESSOR) 25 MG tablet Take 12.5 mg by mouth 2 (two) times daily.    pantoprazole (PROTONIX) 40 MG tablet Take 1 tablet (40 mg total) by mouth 2 (two) times daily before meals. (Patient taking differently: Take 40 mg by mouth once daily.)    potassium chloride SA (K-DUR,KLOR-CON) 20 MEQ tablet Take 20 mEq by mouth every morning.    tamsulosin (FLOMAX) 0.4 mg Cap Take 1 capsule (0.4 mg total) by mouth once daily.       Review of patient's allergies indicates:   Allergen Reactions    Sulfa (sulfonamide antibiotics) Rash       Past Medical History:   Diagnosis Date    Allergy     Arthritis     Cataract     Hypertension      Past Surgical History:   Procedure Laterality Date    BLADDER FULGURATION N/A 2023    Procedure: FULGURATION, BLADDER;  Surgeon: Abdulaziz Garcia MD;  Location: [facility] OR;  Service: Urology;  Laterality: N/A;    CYSTOSCOPY N/A 2023    Procedure: CYSTOSCOPY;  Surgeon: Abdulaziz Garcia MD;  Location: [facility] OR;  Service: Urology;  Laterality: N/A;    ESOPHAGOGASTRODUODENOSCOPY N/A 2023    Procedure: EGD (ESOPHAGOGASTRODUODENOSCOPY);  Surgeon: Varinder Melo MD;  Location: [facility] ENDO;  Service: Gastroenterology;  Laterality: N/A;    HERNIA REPAIR N/A 9/3/2023    Procedure: REPAIR, HERNIA INTERNAL;  Surgeon: Andrew Juares MD;  Location: [facility] OR;  Service:  General;  Laterality: N/A;    INSERTION OF CATHETER N/A 8/19/2023    Procedure: INSERTION, CATHETER;  Surgeon: Abdulaziz Garcia MD;  Location: [facility] OR;  Service: Urology;  Laterality: N/A;    knee surgery      right knee    LAPAROSCOPIC ROBOT-ASSISTED SURGICAL REMOVAL OF KIDNEY USING DA EVIN XI Left 8/20/2023    Procedure: XI ROBOTIC NEPHRECTOMY;  Surgeon: Abdulaziz Garcia MD;  Location: [facility] OR;  Service: Urology;  Laterality: Left;    LAPAROTOMY, EXPLORATORY N/A 9/3/2023    Procedure: LAPAROTOMY, EXPLORATORY;  Surgeon: Andrew Juares MD;  Location: [facility] OR;  Service: General;  Laterality: N/A;     Family History       Problem Relation (Age of Onset)    Cancer Paternal Grandmother, Maternal Grandmother, Maternal Grandfather    Heart disease Mother          Tobacco Use    Smoking status: Former     Passive exposure: Past    Smokeless tobacco: Never   Substance and Sexual Activity    Alcohol use: No    Drug use: No    Sexual activity: Yes     Partners: Female     Review of Systems   Constitutional:  Positive for fatigue. Negative for fever.   Respiratory:  Negative for shortness of breath and wheezing.    Cardiovascular:  Negative for chest pain and leg swelling.   Gastrointestinal:  Positive for abdominal pain, nausea and vomiting. Negative for constipation and diarrhea.   Genitourinary:  Negative for decreased urine volume.   Musculoskeletal:  Negative for back pain and neck pain.   Neurological:  Negative for light-headedness and headaches.   Psychiatric/Behavioral:  Negative for agitation and confusion.      Objective:     Vital Signs (Most Recent):  Temp: 97.6 °F (36.4 °C) (08/29/24 0815)  Pulse: (!) 49 (08/29/24 1200)  Resp: 18 (08/29/24 1200)  BP: (!) 147/70 (08/29/24 1200)  SpO2: 95 % (08/29/24 1200) Vital Signs (24h Range):  Temp:  [97.6 °F (36.4 °C)-98.8 °F (37.1 °C)] 97.6 °F (36.4 °C)  Pulse:  [41-86] 49  Resp:  [15-29] 18  SpO2:  [92 %-97 %] 95 %  BP: ()/(52-92) 147/70     Weight: 68 kg  (149 lb 14.6 oz)  Body mass index is 22.14 kg/m².     Physical Exam  Constitutional:       General: He is not in acute distress.     Appearance: Normal appearance.   HENT:      Head: Normocephalic and atraumatic.   Cardiovascular:      Rate and Rhythm: Normal rate and regular rhythm.   Pulmonary:      Effort: Pulmonary effort is normal. No respiratory distress.   Abdominal:      General: Abdomen is flat. There is no distension.      Palpations: Abdomen is soft.      Tenderness: There is no abdominal tenderness.      Comments: Well healed midline incision  Well healed robotic incisions   Neurological:      General: No focal deficit present.      Mental Status: He is alert and oriented to person, place, and time.   Psychiatric:         Behavior: Behavior normal.         Thought Content: Thought content normal.            I have reviewed all pertinent lab results within the past 24 hours.  CBC:   Recent Labs   Lab 08/29/24 0318   WBC 12.95*   RBC 3.67*   HGB 11.4*   HCT 34.4*   *   MCV 94   MCH 31.1*   MCHC 33.1     CMP:   Recent Labs   Lab 08/29/24 0318 08/29/24  0814   * 113*   CALCIUM 8.2* 8.7   ALBUMIN 2.3*  --    PROT 5.7*  --    * 139   K 4.1 4.1   CO2 18* 19*    107   BUN 44* 48*   CREATININE 3.0* 3.1*   ALKPHOS 238*  --    *  --    *  --    BILITOT 4.2*  --        Significant Diagnostics:  I have reviewed all pertinent imaging results/findings within the past 24 hours.    Assessment/Plan:     * Cholangitis  Josh Pinto is a 84 y.o. gentleman with cholangitis s/p ERCP    - plan to perform cholecystectomy to eliminate risk of recurrent choledocholithiasis  - antibiotics per primary team  - OR tomorrow  - NPO at midnight  - will get consent later today  - ok for DVT ppx      VTE Risk Mitigation (From admission, onward)           Ordered     heparin (porcine) injection 5,000 Units  Every 8 hours         08/28/24 6919     IP VTE HIGH RISK PATIENT  Once         08/28/24  2339     Place sequential compression device  Until discontinued         08/28/24 0640                    Thank you for your consult. I will follow-up with patient. Please contact us if you have any additional questions.    Adam Brasher MD  General Surgery  [facility] - Cardiac Medical ICU

## 2024-08-29 NOTE — ASSESSMENT & PLAN NOTE
Josh Pinto is a 84 y.o. gentleman with cholangitis s/p ERCP    - plan to perform cholecystectomy to eliminate risk of recurrent choledocholithiasis  - antibiotics per primary team  - OR tomorrow  - NPO at midnight  - will get consent later today  - ok for DVT ppx

## 2024-08-29 NOTE — ED NOTES
Pt report received from BRO Martin.  Pt resting comfortably at this time.  Pt denies any needs at this time.  Pt updated on current PoC.  Pt agreeable to plan of care.  All further questions answered to pt satisfaction at this time.  NAD noted at this time.  VSS.  Will continue to monitor.

## 2024-08-29 NOTE — ED NOTES
Pt calling nurse on call light.  States that he has had a BM at this time.  Pt found to have scant amount of hard stool at rectum.  Pt cleaned up by RN and tech.

## 2024-08-29 NOTE — ANESTHESIA PREPROCEDURE EVALUATION
"[facility]  Anesthesia Pre-Operative Evaluation    Patient Name: Josh Pinto  YOB: 1940  MRN: [id number]    SUBJECTIVE:     Pre-operative evaluation for Procedure(s) (LRB):  CHOLECYSTECTOMY, LAPAROSCOPIC (N/A)    08/29/2024    Josh Pinto is a 84 y.o. male with BPH with urinary obstruction resulting in multiple UTIs, SBP s/p hernia repair, HTN, CKD, gout, AF (not on A/C), prior Cdiff, and RCC s/p nephrectomy who presented to [facility] ED on 08/28 with dyspnea, nausea, vomiting, poor PO intake, and fatigue. Per chart review, EMS notes initial oxygen saturation in the 50s therefore he was placed on 15L NRB, however, weaned quickly to NC in th ED. Initial work up with elevated lactic, lipase, and transaminases. Imaging studies performed. Abdominal ultrasound noted gallstones with findings concerning for acute cholecystitis. "There are numerous mobile gallstones. Gallbladder wall is mildly thickened measuring 4 mm. No pericholecystic fluid is present. Common duct is dilated measuring 14 mm. Moderate intrahepatic biliary ductal dilation." CT abdomen and pelvis without contrast noted intra and extrahepatic biliary ductal dilatation of uncertain etiology Massive enlargement of the prostate similar to previous study. Previous left nephrectomy. Stable rim calcified structure at the midpole of the right kidney. There are distal colonic diverticula. No evidence of acute diverticulitis. No bowel obstruction no inflammation bowel. Appendix is present. No ascites."     Patient now presents for the above procedure(s).    2D ECHO:  TTE:  Results for orders placed during the hospital encounter of 09/03/23    Echo    Interpretation Summary    Left Ventricle: The left ventricle is normal in size. Normal wall motion. There is low normal systolic function with a visually estimated ejection fraction of 50 - 55%. There is normal diastolic function.    Right Ventricle: Normal right ventricular " cavity size. Systolic function is normal.    IVC/SVC: Normal venous pressure at 3 mmHg.      DANELLE:  No results found for this or any previous visit.    LDA: None documented.       Peripheral IV - Single Lumen 08/28/24 1109 20 G Left;Posterior Forearm (Active)   Site Assessment Clean;Dry;Intact;No redness;No swelling 08/29/24 1501   Extremity Assessment Distal to IV No warmth;No swelling;No redness;No abnormal discoloration 08/29/24 1501   Line Status Blood return noted;Flushed;Saline locked 08/29/24 1501   Dressing Status Clean;Dry;Intact 08/29/24 1501   Dressing Intervention Integrity maintained 08/29/24 1501   Dressing Change Due 09/01/24 08/29/24 1501   Site Change Due 09/01/24 08/29/24 0705   Reason Not Rotated Not due 08/29/24 1501   Number of days: 1            Peripheral IV - Single Lumen 08/28/24 1806 20 G Anterior;Left Antecubital (Active)   Site Assessment Clean;Dry;Intact;No redness;No swelling 08/29/24 1501   Extremity Assessment Distal to IV No abnormal discoloration;No redness;No swelling;No warmth 08/29/24 1501   Line Status Flushed;Blood return noted;Saline locked 08/29/24 1501   Dressing Status Clean;Dry;Intact 08/29/24 1501   Dressing Intervention Integrity maintained 08/29/24 1501   Dressing Change Due 09/01/24 08/29/24 1501   Site Change Due 09/01/24 08/29/24 0705   Reason Not Rotated Not due 08/29/24 1501   Number of days: 0       Prev airway:     Date/Time: 8/29/2024 9:47 AM     Performed by: Jenny Brown CRNA  Authorized by: Marko Umanzor MD    Intubation:     Induction:  Intravenous    Intubated:  Postinduction    Mask Ventilation:  Easy mask    Attempts:  1    Attempted By:  CRNA    Method of Intubation:  Video laryngoscopy    Blade:  Campbell 3    Laryngeal View Grade: Grade I - full view of cords      Difficult Airway Encountered?: No      Complications:  None    Airway Device:  Oral endotracheal tube    Airway Device Size:  7.5    Style/Cuff Inflation:  Cuffed (inflated to  minimal occlusive pressure)    Tube secured:  23    Secured at:  The lips    Placement Verified By:  Capnometry    Complicating Factors:  None    Findings Post-Intubation:  Atraumatic/condition of teeth unchanged and BS equal bilateral    Drips: None documented.      Patient Active Problem List   Diagnosis    Screening for colorectal cancer    BPH with urinary obstruction    Erectile dysfunction    Stage 4 chronic kidney disease    HTN (hypertension), dx 1985    Renal cell carcinoma of left kidney, nephrectomy in 8/2023    Renal mass    Atrial fibrillation with RVR    Gross hematuria    Acute blood loss anemia    Hypomagnesemia    Slow transit constipation    Atelectasis    Physical deconditioning    Urinary retention    Leukocytosis    Cystitis    Chronic gout of multiple sites    Acute cystitis without hematuria    SBO (small bowel obstruction)    PAF (paroxysmal atrial fibrillation), [id number] score 3, decline OAC    ACP (advance care planning)    Diarrhea    Nausea vomiting and diarrhea    Moderate malnutrition    Clostridium difficile infection    Orthostasis    At high risk for bleeding    History of smoking greater than 50 pack years, quit 1990, 30 year, 2 ppd    Hypoalbuminemia due to protein-calorie malnutrition    Nonspecific abnormal electrocardiogram (ECG) (EKG)    Indwelling Valdez catheter present    Excessive daytime sleepiness    Family history of sleep apnea    At risk for cardiovascular event    Sedentary lifestyle    Iron deficiency anemia due to chronic blood loss    Cholangitis       Review of patient's allergies indicates:   Allergen Reactions    Sulfa (sulfonamide antibiotics) Rash       Current Inpatient Medications:   [START ON 8/31/2024] allopurinoL  50 mg Oral Every other day    finasteride  5 mg Oral Daily    fludrocortisone  100 mcg Oral QAM    heparin (porcine)  5,000 Units Subcutaneous Q8H    piperacillin-tazobactam (Zosyn) IV (PEDS and ADULTS) (extended infusion is not appropriate)   4.5 g Intravenous Q12H    tamsulosin  0.4 mg Oral Daily       Antibiotics (From admission, onward)      Start     Stop Route Frequency Ordered    24 0100  piperacillin-tazobactam (ZOSYN) 4.5 g in D5W 100 mL IVPB (MB+)         -- IV Every 12 hours (non-standard times) 24 2359            VTE Risk Mitigation (From admission, onward)           Ordered     heparin (porcine) injection 5,000 Units  Every 8 hours         24 233     IP VTE HIGH RISK PATIENT  Once         24 233     Place sequential compression device  Until discontinued         24 233                    No current facility-administered medications on file prior to encounter.     Current Outpatient Medications on File Prior to Encounter   Medication Sig Dispense Refill    allopurinoL (ZYLOPRIM) 100 MG tablet Take a half a tablet every other day due to your chronic kidney disease 90 tablet 0    cholecalciferol, vitamin D3, (VITAMIN D3) 125 mcg (5,000 unit) Tab Take 5,000 Units by mouth once daily.      colchicine (COLCRYS) 0.6 mg tablet 1.2 mg at the first sign of  gout flare, followed in 1 hour with a single dose of 0.6 mg. repeat treatment should not occur for at least 14 days. Use with caution due to chronic kidney disease 30 tablet 5    finasteride (PROSCAR) 5 mg tablet Take 1 tablet (5 mg total) by mouth once daily. 90 tablet 3    fludrocortisone (FLORINEF) 0.1 mg Tab Take 100 mcg by mouth every morning.      iron-vitamin C 100-250 mg, ICAR-C, 100-250 mg Tab Take 1 tablet by mouth every morning.      magnesium oxide (MAG-OX) 250 mg magnesium Tab SMARTSI caplet By Mouth Every Morning      metoprolol tartrate (LOPRESSOR) 25 MG tablet Take 12.5 mg by mouth 2 (two) times daily.      pantoprazole (PROTONIX) 40 MG tablet Take 1 tablet (40 mg total) by mouth 2 (two) times daily before meals. (Patient taking differently: Take 40 mg by mouth once daily.) 60 tablet 11    potassium chloride SA (K-DUR,KLOR-CON) 20 MEQ tablet Take  20 mEq by mouth every morning.      tamsulosin (FLOMAX) 0.4 mg Cap Take 1 capsule (0.4 mg total) by mouth once daily. 90 capsule 2       Past Surgical History:   Procedure Laterality Date    BLADDER FULGURATION N/A 8/19/2023    Procedure: FULGURATION, BLADDER;  Surgeon: Abdulaziz Garcia MD;  Location: [facility] OR;  Service: Urology;  Laterality: N/A;    CYSTOSCOPY N/A 8/19/2023    Procedure: CYSTOSCOPY;  Surgeon: Abdulaziz Garcia MD;  Location: [facility] OR;  Service: Urology;  Laterality: N/A;    ESOPHAGOGASTRODUODENOSCOPY N/A 9/20/2023    Procedure: EGD (ESOPHAGOGASTRODUODENOSCOPY);  Surgeon: Varinder Melo MD;  Location: [facility] ENDO;  Service: Gastroenterology;  Laterality: N/A;    HERNIA REPAIR N/A 9/3/2023    Procedure: REPAIR, HERNIA INTERNAL;  Surgeon: Andrew Juares MD;  Location: [facility] OR;  Service: General;  Laterality: N/A;    INSERTION OF CATHETER N/A 8/19/2023    Procedure: INSERTION, CATHETER;  Surgeon: Abdulaziz Garcia MD;  Location: [facility] OR;  Service: Urology;  Laterality: N/A;    knee surgery      right knee    LAPAROSCOPIC ROBOT-ASSISTED SURGICAL REMOVAL OF KIDNEY USING DA EVIN XI Left 8/20/2023    Procedure: XI ROBOTIC NEPHRECTOMY;  Surgeon: Abdulaziz Garcia MD;  Location: [facility] OR;  Service: Urology;  Laterality: Left;    LAPAROTOMY, EXPLORATORY N/A 9/3/2023    Procedure: LAPAROTOMY, EXPLORATORY;  Surgeon: Andrew Juares MD;  Location: [facility] OR;  Service: General;  Laterality: N/A;       Social History     Socioeconomic History    Marital status: Single   Tobacco Use    Smoking status: Former     Passive exposure: Past    Smokeless tobacco: Never   Substance and Sexual Activity    Alcohol use: No    Drug use: No    Sexual activity: Yes     Partners: Female     Social Determinants of Health     Financial Resource Strain: Low Risk  (8/29/2024)    Overall Financial Resource Strain (CARDIA)     Difficulty of Paying Living Expenses: Not hard at all   Food Insecurity: No Food Insecurity  (8/29/2024)    Hunger Vital Sign     Worried About Running Out of Food in the Last Year: Never true     Ran Out of Food in the Last Year: Never true   Transportation Needs: No Transportation Needs (8/29/2024)    TRANSPORTATION NEEDS     Transportation : No   Physical Activity: Inactive (8/29/2024)    Exercise Vital Sign     Days of Exercise per Week: 0 days     Minutes of Exercise per Session: 0 min   Stress: Stress Concern Present (8/29/2024)    [employer] - Occupational Stress Questionnaire     Feeling of Stress : To some extent   Housing Stability: Low Risk  (8/29/2024)    Housing Stability Vital Sign     Unable to Pay for Housing in the Last Year: No     Homeless in the Last Year: No       OBJECTIVE:     Vital Signs Range (Last 24H):  Temp:  [36.4 °C (97.6 °F)-37.1 °C (98.8 °F)]   Pulse:  [41-74]   Resp:  [14-29]   BP: ()/(52-92)   SpO2:  [92 %-98 %]       Significant Labs:  Lab Results   Component Value Date    WBC 12.95 (H) 08/29/2024    HGB 11.4 (L) 08/29/2024    HCT 34.4 (L) 08/29/2024     (L) 08/29/2024    CHOL 138 12/14/2023    TRIG 77 12/14/2023    HDL 37 (L) 12/14/2023     (H) 08/29/2024     (H) 08/29/2024     08/29/2024    K 4.1 08/29/2024     08/29/2024    CREATININE 3.1 (H) 08/29/2024    BUN 48 (H) 08/29/2024    CO2 19 (L) 08/29/2024    TSH 0.754 09/03/2023    PSA 5.9 (H) 02/09/2015    INR 1.1 08/28/2024    HGBA1C 6.2 02/09/2015       Diagnostic Studies: No relevant studies.    EKG:   Results for orders placed or performed during the hospital encounter of 08/28/24   EKG 12-lead    Collection Time: 08/28/24 10:11 AM   Result Value Ref Range    QRS Duration 66 ms    [facility] QTC Calculation 462 ms    Narrative    Test Reason : R06.02,    Vent. Rate : 121 BPM     Atrial Rate : 121 BPM     P-R Int : 158 ms          QRS Dur : 066 ms      QT Int : 326 ms       P-R-T Axes : 083 -65 073 degrees     QTc Int : 462 ms    Sinus tachycardia  Left axis  deviation  Nonspecific ST abnormality  Abnormal ECG  When compared with ECG of 04-JUN-2024 09:45,  Vent. rate has increased BY  62 BPM  ST now depressed in Inferior leads  Confirmed by Johnathan Angel MD ([id number]) on 8/28/2024 4:51:25 PM    Referred By: SAUL   SELF           Confirmed By:Johnathan Angel MD         ASSESSMENT/PLAN:           Pre-op Assessment    I have reviewed the Patient Summary Reports.          Review of Systems  Anesthesia Hx:  No problems with previous Anesthesia                Social:  Non-Smoker       Hematology/Oncology:  Hematology Normal   Oncology Normal                                   EENT/Dental:  EENT/Dental Normal           Cardiovascular:     Hypertension    Dysrhythmias atrial fibrillation                                   Pulmonary:  Pulmonary Normal                       Renal/:  Renal/ Normal                 Hepatic/GI:  Hepatic/GI Normal                 Musculoskeletal:  Musculoskeletal Normal                Neurological:  Neurology Normal                                      Endocrine:  Endocrine Normal            Dermatological:  Skin Normal    Psych:  Psychiatric Normal                    Physical Exam  General: Alert and Oriented    Airway:  Mallampati: II / II  Mouth Opening: Normal  TM Distance: Normal  Tongue: Normal  Neck ROM: Normal ROM    Dental:  Intact        Anesthesia Plan  Type of Anesthesia, risks & benefits discussed:    Anesthesia Type: Gen ETT  Intra-op Monitoring Plan: Standard ASA Monitors  Post Op Pain Control Plan: multimodal analgesia  Airway Plan: Direct and Video, Post-Induction  Informed Consent: Informed consent signed with the Patient and all parties understand the risks and agree with anesthesia plan.  All questions answered.   ASA Score: 3    Ready For Surgery From Anesthesia Perspective.     .

## 2024-08-29 NOTE — ASSESSMENT & PLAN NOTE
Baseline Cr: 2.8-3  Cr at admission to ICU: 3   Estimated Creatinine Clearance: 18.3 mL/min (A) (based on SCr of 3 mg/dL (H)).    -- CMP daily and trend   -- Avoid nephrotoxins   -- Strict I&O   -- Renally dose all medications to appropriate GFR / CrCl   -- MAP > 65 and hgb > 7 goals

## 2024-08-29 NOTE — EICU
Nurses Note -- 4 Eyes      8/28/2024   11:43 PM      Skin assessed during: Admit    Transfer from OSH  [x] No Altered Skin Integrity Present    [x]Prevention Measures Documented      [] Yes- Altered Skin Integrity Present or Discovered   [] LDA Added if Not in Epic (Describe Wound)   [] New Altered Skin Integrity was Present on Admit and Documented in LDA   [] Wound Image Taken    Wound Care Consulted? No    Attending Nurse:  Irais Loja RN/Staff Member:   Giovani

## 2024-08-29 NOTE — PLAN OF CARE
[facility] - Cardiac Medical ICU  Initial Discharge Assessment       Primary Care Provider: Hali Burrell MD    Admission Diagnosis: Cholangitis [K83.09]    Admission Date: 8/28/2024  Expected Discharge Date:     Transition of Care Barriers: None    Payor: AETNA MANAGED MEDICARE / Plan: AETNA MEDICARE PLAN PPO / Product Type: Medicare Advantage /     Extended Emergency Contact Information  Primary Emergency Contact: Sanam Luther  Mobile Phone: [phone]  Relation: Sister  Preferred language: English   needed? No  Secondary Emergency Contact: Linn Cooney   [facility]  Home Phone: [phone]  Relation: Sister    Discharge Plan A: Home Health  Discharge Plan B: Home Health      [id number] [place] - [address]  [address] [place] [address]  Phone: [phone] Fax: [phone]    [facility]. [address] [address]  Phone: [phone] Fax: [phone]      Initial Assessment (most recent)       Adult Discharge Assessment - 08/29/24 1418          Discharge Assessment    Assessment Type Discharge Planning Assessment     Confirmed/corrected address, phone number and insurance Yes     Confirmed Demographics Correct on Facesheet     Source of Information patient     Does patient/caregiver understand observation status Yes     Communicated RAMÓN with patient/caregiver Yes;Date not available/Unable to determine     Reason For Admission Cholangitis     People in Home alone     Facility Arrived From: [facility]     Do you expect to return to your current living situation? Yes     Do you have help at home or someone to help you manage your care at home? Yes     Who are your caregiver(s) and their phone number(s)? Patient has 2 sisters who can help     Prior to hospitilization cognitive status: Alert/Oriented     Current cognitive status: Alert/Oriented     Walking or  Climbing Stairs Difficulty no     Dressing/Bathing Difficulty no     Equipment Currently Used at Home walker, rolling     Readmission within 30 days? No     Patient currently being followed by outpatient case management? No     Do you currently have service(s) that help you manage your care at home? No     Do you take prescription medications? Yes     Do you have prescription coverage? Yes     Coverage Payor:   AETNA MANAGED MEDICARE - AETNA MEDICARE PLAN PPO -     Do you have any problems affording any of your prescribed medications? No     Is the patient taking medications as prescribed? yes     Who is going to help you get home at discharge? Patient has two sisters who can help     How do you get to doctors appointments? car, drives self     Are you on dialysis? No     Do you take coumadin? No     Discharge Plan A Home Health     Discharge Plan B Home Health     DME Needed Upon Discharge  other (see comments)   TBD    Discharge Plan discussed with: Patient     Transition of Care Barriers None        Physical Activity    On average, how many days per week do you engage in moderate to strenuous exercise (like a brisk walk)? 0 days     On average, how many minutes do you engage in exercise at this level? 0 min        Financial Resource Strain    How hard is it for you to pay for the very basics like food, housing, medical care, and heating? Not hard at all        Housing Stability    In the last 12 months, was there a time when you were not able to pay the mortgage or rent on time? No     At any time in the past 12 months, were you homeless or living in a shelter (including now)? No        Transportation Needs    Has the lack of transportation kept you from medical appointments, meetings, work or from getting things needed for daily living? No        Food Insecurity    Within the past 12 months, you worried that your food would run out before you got the money to buy more. Never true     Within the past 12 months,  the food you bought just didn't last and you didn't have money to get more. Never true        Stress    Do you feel stress - tense, restless, nervous, or anxious, or unable to sleep at night because your mind is troubled all the time - these days? To some extent        Social Isolation    How often do you feel lonely or isolated from those around you?  Never        Alcohol Use    Q1: How often do you have a drink containing alcohol? Never     Q2: How many drinks containing alcohol do you have on a typical day when you are drinking? Patient does not drink     Q3: How often do you have six or more drinks on one occasion? Never        Utilities    In the past 12 months has the electric, gas, oil, or water company threatened to shut off services in your home? No        Health Literacy    How often do you need to have someone help you when you read instructions, pamphlets, or other written material from your doctor or pharmacy? Never                   Spoke to pt. Pt lives at home alone. Post hospital  stay, patient has two daughters whom are listed under patient contacts that can help patient and pt. has transportation at d/c with daughters. There have been no hospitalizations within the last 30 days per pt. Verified pt PCP and preferred pharmacy. Pt stated not on Coumadin and is not receiving dialysis. Patient stated he has had [facility] in the past and would like them again upon discharge. All questions answered regarding case management/ discharge planning , pt verbalized understanding. Discharge booklet with SW contact information given to pt.     Discharge Plan A and Plan B have been determined by review of patient's clinical status, future medical and therapeutic needs, and coverage/benefits for post-acute care in coordination with multidisciplinary team members.      LESA Nielson  [facility]   Ext: [phone]

## 2024-08-29 NOTE — ANESTHESIA PREPROCEDURE EVALUATION
08/29/2024  Josh Pinto is a 84 y.o., male from ICU with septic cholangitis for ERCP.      Pre-op Assessment    I have reviewed the Patient Summary Reports.          Review of Systems  Anesthesia Hx:  No problems with previous Anesthesia                Social:  Non-Smoker       Hematology/Oncology:  Hematology Normal   Oncology Normal                                   EENT/Dental:  EENT/Dental Normal           Cardiovascular:     Hypertension    Dysrhythmias atrial fibrillation                                   Pulmonary:  Pulmonary Normal                       Renal/:  Renal/ Normal                 Hepatic/GI:  Hepatic/GI Normal                 Musculoskeletal:  Musculoskeletal Normal                Neurological:  Neurology Normal                                      Endocrine:  Endocrine Normal            Dermatological:  Skin Normal    Psych:  Psychiatric Normal                    Physical Exam  General: Alert and Oriented    Airway:  Mallampati: II / II  Mouth Opening: Normal  TM Distance: Normal  Tongue: Normal  Neck ROM: Normal ROM    Dental:  Intact    Chest/Lungs:  Clear to auscultation, Normal Respiratory Rate    Heart:  Rate: Normal  Rhythm: Regular Rhythm  Sounds: Normal        Anesthesia Plan  Type of Anesthesia, risks & benefits discussed:    Anesthesia Type: Gen ETT  Intra-op Monitoring Plan: Standard ASA Monitors  Post Op Pain Control Plan: multimodal analgesia  Airway Plan: Direct  Informed Consent: Informed consent signed with the Patient and all parties understand the risks and agree with anesthesia plan.  All questions answered.   ASA Score: 3    Ready For Surgery From Anesthesia Perspective.     .

## 2024-08-29 NOTE — PROGRESS NOTES
Therapy with vancomycin was discontinued by the provider.  Pharmacy will sign off, please re-consult as needed.    Thank you for the consult,   Zoe Alvarado, PharmD, [facility]  [phone]

## 2024-08-29 NOTE — ANESTHESIA PROCEDURE NOTES
Intubation    Date/Time: 8/29/2024 9:47 AM    Performed by: Jenny Brown CRNA  Authorized by: Marko Umanzor MD    Intubation:     Induction:  Intravenous    Intubated:  Postinduction    Mask Ventilation:  Easy mask    Attempts:  1    Attempted By:  CRNA    Method of Intubation:  Video laryngoscopy    Blade:  Campbell 3    Laryngeal View Grade: Grade I - full view of cords      Difficult Airway Encountered?: No      Complications:  None    Airway Device:  Oral endotracheal tube    Airway Device Size:  7.5    Style/Cuff Inflation:  Cuffed (inflated to minimal occlusive pressure)    Tube secured:  23    Secured at:  The lips    Placement Verified By:  Capnometry    Complicating Factors:  None    Findings Post-Intubation:  Atraumatic/condition of teeth unchanged and BS equal bilateral

## 2024-08-29 NOTE — PROGRESS NOTES
Pharmacist Renal Dose Adjustment Note    Josh Pinto is a 84 y.o. male being treated with the medication Zosyn.    Patient Data:    Vital Signs (Most Recent):  Temp: 98.8 °F (37.1 °C) (08/28/24 1849)  Pulse: 67 (08/28/24 1849)  Resp: (!) 22 (08/28/24 1849)  BP: (!) 102/55 (08/28/24 1849)  SpO2: (!) 94 % (08/28/24 1849) Vital Signs (72h Range):  Temp:  [98.4 °F (36.9 °C)-98.8 °F (37.1 °C)]   Pulse:  []   Resp:  [19-24]   BP: ()/(55-59)   SpO2:  [91 %-98 %]      Recent Labs   Lab 08/28/24  1116   CREATININE 3.1*     Serum creatinine: 3.1 mg/dL (H) 08/28/24 1116  Estimated creatinine clearance: 17.7 mL/min (A)    Zosyn 4.5 g every 8 hours (over 30 min) has been renally adjusted to Zosyn 4.5 g every 12 hours (over 4 hours).    Pharmacist's Name: Blayne Lea, PharmD  Pharmacist's Extension: [phone]

## 2024-08-29 NOTE — PLAN OF CARE
MICU DAILY GOALS     Family/Goals of care/Code Status   Code Status: Full Code    24H Vital Sign Range  Temp:  [97.8 °F (36.6 °C)-98.8 °F (37.1 °C)]   Pulse:  []   Resp:  [15-29]   BP: ()/(52-92)   SpO2:  [91 %-98 %]      Shift Events (include procedures and significant events)   Pt. Admitted around 2330. No acute events throughout shift.  Pt. Has sinus bradycardia, that drops to 40 while in a deep sleep. BP has been normotensive. Pt. Had a low magnesium of 1.5 and was replaced. VSS.    AWAKE RASS: Goal - RASS Goal: 0-->alert and calm  Actual - RASS (Lee Agitation-Sedation Scale): alert and calm    Restraint necessity: Not necessary   BREATHE SBT: Not intubated    Coordinate A & B, analgesics/sedatives Pain: managed   SAT: Not intubated   Delirium CAM-ICU: Overall CAM-ICU: Negative   Early(intubated/ Progressive (non-intubated) Mobility MOVE Screen (INTUBATED ONLY): Not intubated    Activity: Activity Management: Heel slide - L1   Feeding/Nutrition Diet order: Diet/Nutrition Received: NPO,     Thrombus DVT prophylaxis: VTE Required Core Measure: Pharmacological prophylaxis initiated/maintained   HOB Elevation Head of Bed (HOB) Positioning: HOB at 30-45 degrees   Ulcer Prophylaxis GI: no   Glucose control managed     Skin Skin assessed during: Admit    Sacrum intact/not altered? Yes  Heels intact/not altered? Yes  Surgical wound? No    CHECK ONE!   (no altered skin or altered skin) and sub boxes:  [x] No Altered Skin Integrity Present    [x]Prevention Measures Documented    [] Altered Skin Integrity Present or Discovered   [] LDA present in EPIC, daily doc completed              [] LDA added if not in EPIC (describe wound).                    When describing wound, do not stage, use descriptive words only.    [] Wound Image Taken (required on admit,                   transfer/discharge and every Tuesday)    Wound Care Consulted? No    4 EYES:  Attending Nurse (1st set of eyes): Irais Nguyen  RN    Second RN/Staff Member (2nd set of eyes): BRO Matute   Bowel Function no issues    Indwelling Catheter Necessity            De-escalation Antibiotics No        VS and assessment per flow sheet, patient progressing towards goals as tolerated, plan of care reviewed with  the patient , all concerns addressed, will continue to monitor.

## 2024-08-29 NOTE — PROVATION PATIENT INSTRUCTIONS
Discharge Summary/Instructions after an Endoscopic Procedure  Patient Name: Josh Pinto  Patient MRN: [id number]  Patient YOB: 1940  Thursday, August 29, 2024  Truman Vargas MD  Dear patient,  As a result of recent federal legislation (The Federal Cures Act), you may   receive lab or pathology results from your procedure in your [facility]   account before your physician is able to contact you. Your physician or   their representative will relay the results to you with their   recommendations at their soonest availability.  Thank you,  RESTRICTIONS:  During your procedure today, you received medications for sedation.  These   medications may affect your judgment, balance and coordination.  Therefore,   for 24 hours, you have the following restrictions:   - DO NOT drive a car, operate machinery, make legal/financial decisions,   sign important papers or drink alcohol.    ACTIVITY:  Today: no heavy lifting, straining or running due to procedural   sedation/anesthesia.  The following day: return to full activity including work.  DIET:  Eat and drink normally unless instructed otherwise.     TREATMENT FOR COMMON SIDE EFFECTS:  - Mild abdominal pain, nausea, belching, bloating or excessive gas:  rest,   eat lightly and use a heating pad.  - Sore Throat: treat with throat lozenges and/or gargle with warm salt   water.  - Because air was used during the procedure, expelling large amounts of air   from your rectum or belching is normal.  - If a bowel prep was taken, you may not have a bowel movement for 1-3 days.    This is normal.  SYMPTOMS TO WATCH FOR AND REPORT TO YOUR PHYSICIAN:  1. Abdominal pain or bloating, other than gas cramps.  2. Chest pain.  3. Back pain.  4. Signs of infection such as: chills or fever occurring within 24 hours   after the procedure.  5. Rectal bleeding, which would show as bright red, maroon, or black stools.   (A tablespoon of blood from the rectum is not serious, especially if    hemorrhoids are present.)  6. Vomiting.  7. Weakness or dizziness.  GO DIRECTLY TO THE NEAREST EMERGENCY ROOM IF YOU HAVE ANY OF THE FOLLOWING:      Difficulty breathing              Chills and/or fever over 101 F   Persistent vomiting and/or vomiting blood   Severe abdominal pain   Severe chest pain   Black, tarry stools   Bleeding- more than one tablespoon   Any other symptom or condition that you feel may need urgent attention  Your doctor recommends these additional instructions:  If any biopsies were taken, your doctors clinic will contact you in 1 to 2   weeks with any results.  - Return patient to hospital cloud for ongoing care.   - Resume previous diet.   - Watch for pancreatitis, bleeding, perforation, and cholangitis.   - Continue present medications.   - Continue IV antibiotics while inpatient to cover for cholangitis, as per   primary service.  - Surgical consultation for consideration of cholecystectomy at the next   available appointment.   - Repeat ERCP in 6 weeks to remove stent and further clear the CBD,   preferably after a cholecystectomy is performed.  For questions, problems or results please call your physician - Truman Vargas MD at Work:  [phone].  [facility], EMERGENCY ROOM PHONE NUMBER: [phone]  IF A COMPLICATION OR EMERGENCY SITUATION ARISES AND YOU ARE UNABLE TO REACH   YOUR PHYSICIAN - GO DIRECTLY TO THE EMERGENCY ROOM.  Truman Vargas MD  8/29/2024 10:43:14 AM  This report has been verified and signed electronically.  Dear patient,  As a result of recent federal legislation (The Federal Cures Act), you may   receive lab or pathology results from your procedure in your [facility]   account before your physician is able to contact you. Your physician or   their representative will relay the results to you with their   recommendations at their soonest availability.  Thank you,  PROVATION

## 2024-08-29 NOTE — NURSING
Patient brought back to room by anesthesia staff post procedure on simple face mask. Placed on 2L NC. Patient drowsy but easily aroused by voice and oriented x4. Placed back on bedside monitor, VSS. Patient has a frequent non productive cough. Cellphone handed back to patient and wallet placed on bedside table. Bed locked and in lowest position, call bell and urinal within reach.

## 2024-08-29 NOTE — SUBJECTIVE & OBJECTIVE
Past Medical History:   Diagnosis Date    Allergy     Arthritis     Cataract     Hypertension        Past Surgical History:   Procedure Laterality Date    BLADDER FULGURATION N/A 8/19/2023    Procedure: FULGURATION, BLADDER;  Surgeon: Abdulaziz Garcia MD;  Location: [facility] OR;  Service: Urology;  Laterality: N/A;    CYSTOSCOPY N/A 8/19/2023    Procedure: CYSTOSCOPY;  Surgeon: Abdulaziz Garcia MD;  Location: [facility];  Service: Urology;  Laterality: N/A;    ESOPHAGOGASTRODUODENOSCOPY N/A 9/20/2023    Procedure: EGD (ESOPHAGOGASTRODUODENOSCOPY);  Surgeon: Varinder Melo MD;  Location: [facility] ENDO;  Service: Gastroenterology;  Laterality: N/A;    HERNIA REPAIR N/A 9/3/2023    Procedure: REPAIR, HERNIA INTERNAL;  Surgeon: Andrew Juares MD;  Location: [facility] OR;  Service: General;  Laterality: N/A;    INSERTION OF CATHETER N/A 8/19/2023    Procedure: INSERTION, CATHETER;  Surgeon: Abdulaziz Garcia MD;  Location: [facility];  Service: Urology;  Laterality: N/A;    knee surgery      right knee    LAPAROSCOPIC ROBOT-ASSISTED SURGICAL REMOVAL OF KIDNEY USING DA EVIN XI Left 8/20/2023    Procedure: XI ROBOTIC NEPHRECTOMY;  Surgeon: Abdulaziz Garcia MD;  Location: [facility] OR;  Service: Urology;  Laterality: Left;    LAPAROTOMY, EXPLORATORY N/A 9/3/2023    Procedure: LAPAROTOMY, EXPLORATORY;  Surgeon: Andrew Juares MD;  Location: [facility];  Service: General;  Laterality: N/A;       Review of patient's allergies indicates:   Allergen Reactions    Sulfa (sulfonamide antibiotics) Rash       Family History       Problem Relation (Age of Onset)    Cancer Paternal Grandmother, Maternal Grandmother, Maternal Grandfather    Heart disease Mother          Tobacco Use    Smoking status: Former     Passive exposure: Past    Smokeless tobacco: Never   Substance and Sexual Activity    Alcohol use: No    Drug use: No    Sexual activity: Yes     Partners: Female      Review of Systems   Constitutional:  Positive for fatigue.  Negative for fever.   Respiratory:  Negative for shortness of breath and wheezing.    Cardiovascular:  Negative for chest pain and leg swelling.   Gastrointestinal:  Positive for abdominal pain, nausea and vomiting. Negative for constipation and diarrhea.   Genitourinary:  Negative for decreased urine volume.   Musculoskeletal:  Negative for back pain and neck pain.   Neurological:  Negative for light-headedness and headaches.   Psychiatric/Behavioral:  Negative for agitation and confusion.      Objective:     Vital Signs (Most Recent):  Temp: 98.8 °F (37.1 °C) (08/28/24 2340)  Pulse: (!) 55 (08/29/24 0000)  Resp: 19 (08/29/24 0000)  BP: (!) 100/56 (08/29/24 0000)  SpO2: (!) 94 % (08/29/24 0000) Vital Signs (24h Range):  Temp:  [98.4 °F (36.9 °C)-98.8 °F (37.1 °C)] 98.8 °F (37.1 °C)  Pulse:  [] 55  Resp:  [19-26] 19  SpO2:  [91 %-98 %] 94 %  BP: ()/(55-92) 100/56   Weight: 68 kg (149 lb 14.6 oz)  Body mass index is 22.14 kg/m².    No intake or output data in the 24 hours ending 08/29/24 0037       Physical Exam  Vitals and nursing note reviewed.   Constitutional:       General: He is not in acute distress.  HENT:      Head: Normocephalic and atraumatic.      Mouth/Throat:      Mouth: Mucous membranes are dry.      Pharynx: Oropharynx is clear. No oropharyngeal exudate.   Eyes:      General: No scleral icterus.     Pupils: Pupils are equal, round, and reactive to light.   Cardiovascular:      Rate and Rhythm: Normal rate and regular rhythm.      Pulses: Normal pulses.   Pulmonary:      Effort: Pulmonary effort is normal. No respiratory distress.   Abdominal:      General: Bowel sounds are normal.      Palpations: Abdomen is soft.      Tenderness: There is abdominal tenderness.   Musculoskeletal:      Right lower leg: No edema.      Left lower leg: No edema.   Skin:     General: Skin is dry.      Capillary Refill: Capillary refill takes less than 2 seconds.      Coloration: Skin is not jaundiced.    Neurological:      General: No focal deficit present.      Mental Status: He is alert and oriented to person, place, and time. Mental status is at baseline.   Psychiatric:         Mood and Affect: Mood normal.            Vents:     Lines/Drains/Airways       Peripheral Intravenous Line  Duration                  Peripheral IV - Single Lumen 08/28/24 1109 20 G Left;Posterior Forearm <1 day         Peripheral IV - Single Lumen 08/28/24 1806 20 G Anterior;Left Antecubital <1 day                  Significant Labs:    CBC/Anemia Profile:  Recent Labs   Lab 08/28/24  1116   WBC 6.97   HGB 14.2   HCT 42.9      MCV 93   RDW 14.6*        Chemistries:  Recent Labs   Lab 08/28/24  1116 08/28/24  1851    139   K 4.5 4.2   * 106   CO2 16* 18*   BUN 48* 44*   CREATININE 3.1* 3.0*   CALCIUM 9.6 8.5*   ALBUMIN 3.3*  --    PROT 7.2  --    BILITOT 7.3*  --    ALKPHOS 420*  --    *  --    *  --        All pertinent labs within the past 24 hours have been reviewed.    Significant Imaging: I have reviewed all pertinent imaging results/findings within the past 24 hours.

## 2024-08-29 NOTE — ASSESSMENT & PLAN NOTE
Abdominal ultrasound noted gallstones with findings concerning for acute cholecystitis.  There are numerous mobile gallstones.  Gallbladder wall is mildly thickened measuring 4 mm.  No pericholecystic fluid is present.  Common duct is dilated measuring 14 mm.  Moderate intrahepatic biliary ductal dilation.     CT abdomen and pelvis without contrast noted intra and extrahepatic biliary ductal dilatation of uncertain etiology.  Follow-up MRCP would be helpful.  Suspect the presence of gallstones.  Massive enlargement of the prostate similar to previous study.  Previous left nephrectomy.  Stable rim calcified structure at the midpole of the right kidney.  There are distal colonic diverticula.  No evidence of acute diverticulitis.  No bowel obstruction no inflammation bowel.  Appendix is present.  No ascites.    -- AES consulted. Appreciate assistance   -- NPO   -- Continue broad spectrum abx and follow up infectious work up   -- Trending LFTs and renal function

## 2024-08-30 ENCOUNTER — ANESTHESIA (OUTPATIENT)
Dept: SURGERY | Facility: HOSPITAL | Age: 84
End: 2024-08-30
Payer: MEDICARE

## 2024-08-30 PROBLEM — R78.81 BACTEREMIA: Status: ACTIVE | Noted: 2024-08-30

## 2024-08-30 LAB
ALBUMIN SERPL BCP-MCNC: 2.5 G/DL (ref 3.5–5.2)
ALP SERPL-CCNC: 190 U/L (ref 55–135)
ALT SERPL W/O P-5'-P-CCNC: 177 U/L (ref 10–44)
ANION GAP SERPL CALC-SCNC: 13 MMOL/L (ref 8–16)
AST SERPL-CCNC: 66 U/L (ref 10–40)
BASOPHILS # BLD AUTO: 0.02 K/UL (ref 0–0.2)
BASOPHILS NFR BLD: 0.2 % (ref 0–1.9)
BILIRUB SERPL-MCNC: 1.6 MG/DL (ref 0.1–1)
BUN SERPL-MCNC: 61 MG/DL (ref 8–23)
CALCIUM SERPL-MCNC: 8.2 MG/DL (ref 8.7–10.5)
CHLORIDE SERPL-SCNC: 109 MMOL/L (ref 95–110)
CO2 SERPL-SCNC: 16 MMOL/L (ref 23–29)
CREAT SERPL-MCNC: 3.3 MG/DL (ref 0.5–1.4)
DIFFERENTIAL METHOD BLD: ABNORMAL
EOSINOPHIL # BLD AUTO: 0 K/UL (ref 0–0.5)
EOSINOPHIL NFR BLD: 0 % (ref 0–8)
ERYTHROCYTE [DISTWIDTH] IN BLOOD BY AUTOMATED COUNT: 15.1 % (ref 11.5–14.5)
EST. GFR  (NO RACE VARIABLE): 17.9 ML/MIN/1.73 M^2
GLUCOSE SERPL-MCNC: 161 MG/DL (ref 70–110)
HCT VFR BLD AUTO: 37.5 % (ref 40–54)
HGB BLD-MCNC: 12.3 G/DL (ref 14–18)
IMM GRANULOCYTES # BLD AUTO: 0.3 K/UL (ref 0–0.04)
IMM GRANULOCYTES NFR BLD AUTO: 2.5 % (ref 0–0.5)
LYMPHOCYTES # BLD AUTO: 0.5 K/UL (ref 1–4.8)
LYMPHOCYTES NFR BLD: 3.9 % (ref 18–48)
MAGNESIUM SERPL-MCNC: 2.4 MG/DL (ref 1.6–2.6)
MCH RBC QN AUTO: 31.5 PG (ref 27–31)
MCHC RBC AUTO-ENTMCNC: 32.8 G/DL (ref 32–36)
MCV RBC AUTO: 96 FL (ref 82–98)
MONOCYTES # BLD AUTO: 0.8 K/UL (ref 0.3–1)
MONOCYTES NFR BLD: 6.4 % (ref 4–15)
NEUTROPHILS # BLD AUTO: 10.4 K/UL (ref 1.8–7.7)
NEUTROPHILS NFR BLD: 87 % (ref 38–73)
NRBC BLD-RTO: 0 /100 WBC
PHOSPHATE SERPL-MCNC: 5.2 MG/DL (ref 2.7–4.5)
PLATELET # BLD AUTO: 160 K/UL (ref 150–450)
PMV BLD AUTO: 12.3 FL (ref 9.2–12.9)
POCT GLUCOSE: 102 MG/DL (ref 70–110)
POCT GLUCOSE: 117 MG/DL (ref 70–110)
POCT GLUCOSE: 121 MG/DL (ref 70–110)
POCT GLUCOSE: 95 MG/DL (ref 70–110)
POTASSIUM SERPL-SCNC: 4.7 MMOL/L (ref 3.5–5.1)
PROT SERPL-MCNC: 6.4 G/DL (ref 6–8.4)
RBC # BLD AUTO: 3.9 M/UL (ref 4.6–6.2)
SODIUM SERPL-SCNC: 138 MMOL/L (ref 136–145)
WBC # BLD AUTO: 11.94 K/UL (ref 3.9–12.7)

## 2024-08-30 PROCEDURE — 25000003 PHARM REV CODE 250: Performed by: INTERNAL MEDICINE

## 2024-08-30 PROCEDURE — 94761 N-INVAS EAR/PLS OXIMETRY MLT: CPT

## 2024-08-30 PROCEDURE — 37000009 HC ANESTHESIA EA ADD 15 MINS: Performed by: STUDENT IN AN ORGANIZED HEALTH CARE EDUCATION/TRAINING PROGRAM

## 2024-08-30 PROCEDURE — 84100 ASSAY OF PHOSPHORUS: CPT

## 2024-08-30 PROCEDURE — 25000003 PHARM REV CODE 250

## 2024-08-30 PROCEDURE — 63600175 PHARM REV CODE 636 W HCPCS: Performed by: INTERNAL MEDICINE

## 2024-08-30 PROCEDURE — 21400001 HC TELEMETRY ROOM

## 2024-08-30 PROCEDURE — 71000033 HC RECOVERY, INTIAL HOUR: Performed by: STUDENT IN AN ORGANIZED HEALTH CARE EDUCATION/TRAINING PROGRAM

## 2024-08-30 PROCEDURE — 36000709 HC OR TIME LEV III EA ADD 15 MIN: Performed by: STUDENT IN AN ORGANIZED HEALTH CARE EDUCATION/TRAINING PROGRAM

## 2024-08-30 PROCEDURE — 88304 TISSUE EXAM BY PATHOLOGIST: CPT | Mod: 26,,, | Performed by: PATHOLOGY

## 2024-08-30 PROCEDURE — 37000008 HC ANESTHESIA 1ST 15 MINUTES: Performed by: STUDENT IN AN ORGANIZED HEALTH CARE EDUCATION/TRAINING PROGRAM

## 2024-08-30 PROCEDURE — 47562 LAPAROSCOPIC CHOLECYSTECTOMY: CPT | Mod: ,,, | Performed by: STUDENT IN AN ORGANIZED HEALTH CARE EDUCATION/TRAINING PROGRAM

## 2024-08-30 PROCEDURE — 80053 COMPREHEN METABOLIC PANEL: CPT

## 2024-08-30 PROCEDURE — 71000015 HC POSTOP RECOV 1ST HR: Performed by: STUDENT IN AN ORGANIZED HEALTH CARE EDUCATION/TRAINING PROGRAM

## 2024-08-30 PROCEDURE — 63600175 PHARM REV CODE 636 W HCPCS

## 2024-08-30 PROCEDURE — 63600175 PHARM REV CODE 636 W HCPCS: Mod: JZ,JG | Performed by: STUDENT IN AN ORGANIZED HEALTH CARE EDUCATION/TRAINING PROGRAM

## 2024-08-30 PROCEDURE — 25000003 PHARM REV CODE 250: Performed by: NURSE PRACTITIONER

## 2024-08-30 PROCEDURE — 63600175 PHARM REV CODE 636 W HCPCS: Performed by: NURSE ANESTHETIST, CERTIFIED REGISTERED

## 2024-08-30 PROCEDURE — BF131ZZ FLUOROSCOPY OF GALLBLADDER AND BILE DUCTS USING LOW OSMOLAR CONTRAST: ICD-10-PCS | Performed by: STUDENT IN AN ORGANIZED HEALTH CARE EDUCATION/TRAINING PROGRAM

## 2024-08-30 PROCEDURE — 27201423 OPTIME MED/SURG SUP & DEVICES STERILE SUPPLY: Performed by: STUDENT IN AN ORGANIZED HEALTH CARE EDUCATION/TRAINING PROGRAM

## 2024-08-30 PROCEDURE — 36000708 HC OR TIME LEV III 1ST 15 MIN: Performed by: STUDENT IN AN ORGANIZED HEALTH CARE EDUCATION/TRAINING PROGRAM

## 2024-08-30 PROCEDURE — 25000003 PHARM REV CODE 250: Performed by: NURSE ANESTHETIST, CERTIFIED REGISTERED

## 2024-08-30 PROCEDURE — 25000003 PHARM REV CODE 250: Performed by: SURGERY

## 2024-08-30 PROCEDURE — 83735 ASSAY OF MAGNESIUM: CPT

## 2024-08-30 PROCEDURE — 88304 TISSUE EXAM BY PATHOLOGIST: CPT | Performed by: PATHOLOGY

## 2024-08-30 PROCEDURE — 99900035 HC TECH TIME PER 15 MIN (STAT)

## 2024-08-30 PROCEDURE — 0FT44ZZ RESECTION OF GALLBLADDER, PERCUTANEOUS ENDOSCOPIC APPROACH: ICD-10-PCS | Performed by: STUDENT IN AN ORGANIZED HEALTH CARE EDUCATION/TRAINING PROGRAM

## 2024-08-30 PROCEDURE — 20600001 HC STEP DOWN PRIVATE ROOM

## 2024-08-30 PROCEDURE — 82962 GLUCOSE BLOOD TEST: CPT | Performed by: STUDENT IN AN ORGANIZED HEALTH CARE EDUCATION/TRAINING PROGRAM

## 2024-08-30 PROCEDURE — 85025 COMPLETE CBC W/AUTO DIFF WBC: CPT

## 2024-08-30 PROCEDURE — 27000221 HC OXYGEN, UP TO 24 HOURS

## 2024-08-30 RX ORDER — OXYCODONE HYDROCHLORIDE 5 MG/1
5 TABLET ORAL EVERY 6 HOURS PRN
Status: DISCONTINUED | OUTPATIENT
Start: 2024-08-30 | End: 2024-09-12 | Stop reason: HOSPADM

## 2024-08-30 RX ORDER — ROCURONIUM BROMIDE 10 MG/ML
INJECTION, SOLUTION INTRAVENOUS
Status: DISCONTINUED | OUTPATIENT
Start: 2024-08-30 | End: 2024-08-30

## 2024-08-30 RX ORDER — LIDOCAINE HYDROCHLORIDE 20 MG/ML
INJECTION INTRAVENOUS
Status: DISCONTINUED | OUTPATIENT
Start: 2024-08-30 | End: 2024-08-30

## 2024-08-30 RX ORDER — ONDANSETRON HYDROCHLORIDE 2 MG/ML
INJECTION, SOLUTION INTRAVENOUS
Status: DISCONTINUED | OUTPATIENT
Start: 2024-08-30 | End: 2024-08-30

## 2024-08-30 RX ORDER — KETAMINE HCL IN 0.9 % NACL 50 MG/5 ML
SYRINGE (ML) INTRAVENOUS
Status: DISCONTINUED | OUTPATIENT
Start: 2024-08-30 | End: 2024-08-30

## 2024-08-30 RX ORDER — METOPROLOL TARTRATE 1 MG/ML
INJECTION, SOLUTION INTRAVENOUS
Status: DISPENSED
Start: 2024-08-30 | End: 2024-08-31

## 2024-08-30 RX ORDER — DEXAMETHASONE SODIUM PHOSPHATE 4 MG/ML
INJECTION, SOLUTION INTRA-ARTICULAR; INTRALESIONAL; INTRAMUSCULAR; INTRAVENOUS; SOFT TISSUE
Status: DISCONTINUED | OUTPATIENT
Start: 2024-08-30 | End: 2024-08-30

## 2024-08-30 RX ORDER — METOPROLOL TARTRATE 1 MG/ML
5 INJECTION, SOLUTION INTRAVENOUS ONCE
Status: COMPLETED | OUTPATIENT
Start: 2024-08-30 | End: 2024-08-30

## 2024-08-30 RX ORDER — BUPIVACAINE HYDROCHLORIDE 2.5 MG/ML
INJECTION, SOLUTION EPIDURAL; INFILTRATION; INTRACAUDAL
Status: DISCONTINUED | OUTPATIENT
Start: 2024-08-30 | End: 2024-08-30 | Stop reason: HOSPADM

## 2024-08-30 RX ORDER — HALOPERIDOL 5 MG/ML
0.5 INJECTION INTRAMUSCULAR EVERY 10 MIN PRN
Status: DISCONTINUED | OUTPATIENT
Start: 2024-08-30 | End: 2024-08-30 | Stop reason: HOSPADM

## 2024-08-30 RX ORDER — GLUCAGON 1 MG
1 KIT INJECTION
Status: DISCONTINUED | OUTPATIENT
Start: 2024-08-30 | End: 2024-08-30

## 2024-08-30 RX ORDER — HYDROMORPHONE HYDROCHLORIDE 1 MG/ML
0.2 INJECTION, SOLUTION INTRAMUSCULAR; INTRAVENOUS; SUBCUTANEOUS EVERY 5 MIN PRN
Status: DISCONTINUED | OUTPATIENT
Start: 2024-08-30 | End: 2024-08-30 | Stop reason: HOSPADM

## 2024-08-30 RX ORDER — SUCCINYLCHOLINE CHLORIDE 20 MG/ML
INJECTION INTRAMUSCULAR; INTRAVENOUS
Status: DISCONTINUED | OUTPATIENT
Start: 2024-08-30 | End: 2024-08-30

## 2024-08-30 RX ORDER — FENTANYL CITRATE 50 UG/ML
INJECTION, SOLUTION INTRAMUSCULAR; INTRAVENOUS
Status: DISCONTINUED | OUTPATIENT
Start: 2024-08-30 | End: 2024-08-30

## 2024-08-30 RX ORDER — PROPOFOL 10 MG/ML
VIAL (ML) INTRAVENOUS
Status: DISCONTINUED | OUTPATIENT
Start: 2024-08-30 | End: 2024-08-30

## 2024-08-30 RX ORDER — SODIUM CHLORIDE 0.9 % (FLUSH) 0.9 %
10 SYRINGE (ML) INJECTION
Status: DISCONTINUED | OUTPATIENT
Start: 2024-08-30 | End: 2024-08-30 | Stop reason: HOSPADM

## 2024-08-30 RX ADMIN — ROCURONIUM BROMIDE 45 MG: 10 INJECTION, SOLUTION INTRAVENOUS at 09:08

## 2024-08-30 RX ADMIN — PIPERACILLIN SODIUM AND TAZOBACTAM SODIUM 4.5 G: 4; .5 INJECTION, POWDER, FOR SOLUTION INTRAVENOUS at 03:08

## 2024-08-30 RX ADMIN — SUCCINYLCHOLINE 100 MG: 20 INJECTION, SOLUTION INTRAMUSCULAR; INTRAVENOUS at 08:08

## 2024-08-30 RX ADMIN — FENTANYL CITRATE 100 MCG: 50 INJECTION, SOLUTION INTRAMUSCULAR; INTRAVENOUS at 08:08

## 2024-08-30 RX ADMIN — PROPOFOL 130 MG: 10 INJECTION, EMULSION INTRAVENOUS at 08:08

## 2024-08-30 RX ADMIN — PIPERACILLIN SODIUM AND TAZOBACTAM SODIUM 4.5 G: 4; .5 INJECTION, POWDER, FOR SOLUTION INTRAVENOUS at 12:08

## 2024-08-30 RX ADMIN — LIDOCAINE HYDROCHLORIDE 100 MG: 20 INJECTION INTRAVENOUS at 08:08

## 2024-08-30 RX ADMIN — ONDANSETRON 4 MG: 2 INJECTION INTRAMUSCULAR; INTRAVENOUS at 10:08

## 2024-08-30 RX ADMIN — ROCURONIUM BROMIDE 10 MG: 10 INJECTION, SOLUTION INTRAVENOUS at 09:08

## 2024-08-30 RX ADMIN — ROCURONIUM BROMIDE 5 MG: 10 INJECTION, SOLUTION INTRAVENOUS at 08:08

## 2024-08-30 RX ADMIN — HEPARIN SODIUM 5000 UNITS: 5000 INJECTION INTRAVENOUS; SUBCUTANEOUS at 03:08

## 2024-08-30 RX ADMIN — SODIUM CHLORIDE, SODIUM GLUCONATE, SODIUM ACETATE, POTASSIUM CHLORIDE, MAGNESIUM CHLORIDE, SODIUM PHOSPHATE, DIBASIC, AND POTASSIUM PHOSPHATE: .53; .5; .37; .037; .03; .012; .00082 INJECTION, SOLUTION INTRAVENOUS at 08:08

## 2024-08-30 RX ADMIN — METOROPROLOL TARTRATE 5 MG: 5 INJECTION, SOLUTION INTRAVENOUS at 12:08

## 2024-08-30 RX ADMIN — HEPARIN SODIUM 5000 UNITS: 5000 INJECTION INTRAVENOUS; SUBCUTANEOUS at 09:08

## 2024-08-30 RX ADMIN — OXYCODONE 5 MG: 5 TABLET ORAL at 02:08

## 2024-08-30 RX ADMIN — DEXAMETHASONE SODIUM PHOSPHATE 4 MG: 4 INJECTION, SOLUTION INTRAMUSCULAR; INTRAVENOUS at 09:08

## 2024-08-30 RX ADMIN — Medication 20 MG: at 09:08

## 2024-08-30 RX ADMIN — TAMSULOSIN HYDROCHLORIDE 0.4 MG: 0.4 CAPSULE ORAL at 11:08

## 2024-08-30 RX ADMIN — Medication 10 MG: at 10:08

## 2024-08-30 NOTE — NURSING
Pt to floor via transport in ICU bed. Pt aaox4, endorsing 7/10 pain, bed in lowest and locked position, siderails up x3, and call light and bedside table within reach. No questions or concerns at this time.

## 2024-08-30 NOTE — ANESTHESIA RELEASE NOTE
"Anesthesia Release from PACU Note    Patient: Josh Pinto    Procedure(s) Performed: Procedure(s) (LRB):  CHOLECYSTECTOMY, LAPAROSCOPIC (N/A)    Anesthesia type: general    Post pain: Adequate analgesia    Post assessment: no apparent anesthetic complications, tolerated procedure well, and no evidence of recall    Last Vitals: Visit Vitals  /74 (BP Location: Right arm, Patient Position: Lying)   Pulse 85   Temp 36.6 °C (97.8 °F) (Temporal)   Resp 10   Ht 5' 9" (1.753 m)   Wt 68 kg (149 lb 14.6 oz)   SpO2 95%   BMI 22.14 kg/m²       Post vital signs: stable    Level of consciousness: awake and alert     Nausea/Vomiting: no nausea/no vomiting    Complications: none    Airway Patency: patent    Respiratory: spontaneous ventilation, room air    Cardiovascular: stable and blood pressure at baseline    Hydration: euvolemic  "

## 2024-08-30 NOTE — ANESTHESIA PROCEDURE NOTES
Intubation    Date/Time: 8/30/2024 8:58 AM    Performed by: Marce Norris CRNA  Authorized by: Navid Basilio MD    Intubation:     Induction:  Rapid sequence induction    Intubated:  Postinduction    Mask Ventilation:  N/a    Attempts:  1    Attempted By:  CRNA    Method of Intubation:  Video laryngoscopy    Blade:  Campbell 3    Laryngeal View Grade: Grade I - full view of cords      Difficult Airway Encountered?: No      Complications:  None    Airway Device:  Oral endotracheal tube    Airway Device Size:  7.5    Style/Cuff Inflation:  Cuffed (inflated to minimal occlusive pressure)    Inflation Amount (mL):  10    Tube secured:  24    Secured at:  The lips    Placement Verified By:  Capnometry    Complicating Factors:  None    Findings Post-Intubation:  BS equal bilateral and atraumatic/condition of teeth unchanged

## 2024-08-30 NOTE — ANESTHESIA POSTPROCEDURE EVALUATION
Anesthesia Post Evaluation    Patient: Josh Pinto    Procedure(s) Performed: Procedure(s) (LRB):  ERCP (ENDOSCOPIC RETROGRADE CHOLANGIOPANCREATOGRAPHY) (N/A)    Final Anesthesia Type: general      Patient location during evaluation: PACU  Patient participation: Yes- Able to Participate  Level of consciousness: awake and alert  Post-procedure vital signs: reviewed and stable  Pain management: adequate  Airway patency: patent    PONV status: None or treated.  Anesthetic complications: no      Cardiovascular status: hemodynamically stable  Respiratory status: spontaneous ventilation  Hydration status: euvolemic  Follow-up not needed.          Vitals Value Taken Time   /72 08/30/24 0830   Temp 36.5 °C (97.7 °F) 08/30/24 0715   Pulse 49 08/30/24 0835   Resp 13 08/30/24 0835   SpO2 98 % 08/30/24 0835   Vitals shown include unfiled device data.      No case tracking events are documented in the log.      Pain/Ventura Score: No data recorded

## 2024-08-30 NOTE — NURSING
Pt's wallet to pt belonging safe at pt's request, for duration of procedure today.    Wallet, cell phone, and watch to pt new room via PCT   Inadequate protein-energy intake

## 2024-08-30 NOTE — ASSESSMENT & PLAN NOTE
Josh Pinto is a 84 y.o. gentleman with cholangitis s/p ERCP    - plan to proceed to OR today for lap raghu   - surgical consent in chart  - NPO since midnight  - please call with questions  - ok for DVT ppx

## 2024-08-30 NOTE — CARE UPDATE
I have reviewed the chart of Josh Pinto and collaborated with David Yi MD in the care of the patient who is hospitalized for the following:    Active Hospital Problems    Diagnosis    *Cholangitis    Bacteremia    Chronic gout of multiple sites    Acute hypoxemic respiratory failure    Stage 4 chronic kidney disease    Renal cell carcinoma of left kidney, nephrectomy in 8/2023    HTN (hypertension), dx 1985    BPH with urinary obstruction          I have reviewed Josh Pinto with the multidisciplinary team during discharge huddle.       Migdalia Wan PA-C  [facility]

## 2024-08-30 NOTE — OP NOTE
Date of Procedure: 8/30/2024     Procedure: Procedure(s) (LRB):  CHOLECYSTECTOMY, LAPAROSCOPIC (N/A)     Surgeons and Role:     * Jenae Rasmussen MD - Primary     * Shar Palomino MD - Resident - Assisting     * Chula Christopher MD - Resident - Assisting        Pre-Operative Diagnosis: Cholangitis [K83.09]    Post-Operative Diagnosis: Post-Op Diagnosis Codes:     * Cholangitis [K83.09]    Anesthesia: General    Estimated Blood Loss (EBL): 250cc     Operative Findings (including complications, if any): laparoscopic cholecystectomy. Significant inflammation and omental adhesions to gallbladder. Critical view obtained.        Procedure:   The patient was identified in the MICU and all questions were answered. Informed consent was verified. He was then transported to the operating room and placed on the operating table in the supine position. Anesthesia was induced without complication. The abdomen was then prepped and draped in the usual standard fashion. He was already on scheduled antibiotics. A timeout was performed verifying correct patient and procedure.     An 11 blade was used to make a stab incision at McBurney's point.  A veress needle was inserted. Pneumoperitoneum was established. A 5 mm Optiview port was placed at the umbilicus off midline without difficulty. 3 more working ports were placed, one subxiphoid, two over the gallbladder. The umbilical port was up-sized to a 12mm Reyes trochar. The gallbladder was noted to be grossly inflamed with a dense joao and there were significant omental adhesins.  The gallbladder was grasped. Careful blunt dissection and electrocautery was used to create a critical view. The cystic duct was clipped twice distally once proximally and divided. The cystic artery was clipped twice distally and one proximally and divided. The gallbladder was removed off the liver with Bovie. The gallbladder was removed from the abdomen using an Endocatch bag. The right upper  quadrant was inspected for hemostasis. Hemostasis was achieved using electrocautery. The right upper quadrant was copiously irrigated. Tami powder was placed in the gallbladder fossa. All ports were removed under laparoscopic vision. The umbilical port site was closed with a 0 Vicryl using the collins sheree suture passer. All port sites were injected with 0.25% marcaine and closed at skin with 4-0 monocryl suture.      Lap and needle counts were correct at the end of the case. The patient was then awaken from anesthesia and transported to the PACU in stable condition.       Complications: none    Specimens:   Specimen (24h ago, onward)       Start     Ordered    08/30/24 1024  Specimen to Pathology, Surgery General Surgery  Once        Comments: Pre-op Diagnosis: Cholangitis [K83.09]Procedure(s):CHOLECYSTECTOMY, LAPAROSCOPIC Number of specimens: 1Name of specimens: Gallbladder     References:    Click here for ordering Quick Tip   Question Answer Comment   Procedure Type: General Surgery    Specimen Class: Routine/Screening    Release to patient Immediate        08/30/24 1024                       Chula Christopher MD  General Surgery, PGY-3

## 2024-08-30 NOTE — TREATMENT PLAN
AES Treatment Plan    Josh Pinto is a 84 y.o. male admitted to hospital 8/28/2024 (Hospital Day: 3) due to Cholangitis.     Interval History  S/p ERCP with stones and sludge causing obstruction of the cbd, biliary sphincterotomy, balloon extraction, plastic stent placed.   Patient feels well since yesterday, just hungry. He has remained NPO for lap raghu which is planned for today.     Objective  Temp:  [97.4 °F (36.3 °C)-98.1 °F (36.7 °C)] 97.7 °F (36.5 °C) (08/30 0715)  Pulse:  [42-74] 50 (08/30 0830)  BP: (119-159)/(60-75) 159/72 (08/30 0830)  Resp:  [14-26] 15 (08/30 0830)  SpO2:  [90 %-99 %] 98 % (08/30 0830)    General: Alert, Oriented x3, no distress  Abdomen: Soft. Non-distended. Non-tender. No rebound or guarding.    Laboratory    Recent Labs   Lab 08/30/24  0318   WBC 11.94   RBC 3.90*   HGB 12.3*   HCT 37.5*      MCV 96   MCH 31.5*   MCHC 32.8      Recent Labs   Lab 08/30/24  0318   CALCIUM 8.2*   ALBUMIN 2.5*   PROT 6.4      K 4.7   CO2 16*      BUN 61*   CREATININE 3.3*   ALKPHOS 190*   *   AST 66*   BILITOT 1.6*          Assessment and Plan  - continue antibiotics per primary team  - lap raghu per surgery   - We are signing-off. Please call with any questions.    Thank you for involving us in the care of Josh Pinto. Please call with any additional questions, concerns or changes in the patient's clinical status.    Rae Bal MD  Gastroenterology and Hepatology Fellow, PGY-4

## 2024-08-30 NOTE — CARE UPDATE
Pt with no significant events overnight. Loose stools x2, NPO since 0000. POC for surgery this AM, discussed with pt all concerns addressed. WCTM progress.

## 2024-08-30 NOTE — ANESTHESIA POSTPROCEDURE EVALUATION
Anesthesia Post Evaluation    Patient: Josh Pinto    Procedure(s) Performed: Procedure(s) (LRB):  CHOLECYSTECTOMY, LAPAROSCOPIC (N/A)    Final Anesthesia Type: general      Patient location during evaluation: PACU  Patient participation: Yes- Able to Participate  Level of consciousness: awake and alert  Post-procedure vital signs: reviewed and stable  Pain management: adequate  Airway patency: patent  GISELA mitigation strategies: Multimodal analgesia, Preoperative use of mandibular advancement devices or oral appliances, Intraoperative administration of CPAP, nasopharyngeal airway, or oral appliance during sedation and Extubation while patient is awake  PONV status at discharge: No PONV  Anesthetic complications: no      Cardiovascular status: blood pressure returned to baseline, hemodynamically stable and stable  Respiratory status: unassisted and spontaneous ventilation  Hydration status: euvolemic  Follow-up not needed.              Vitals Value Taken Time   /74 08/30/24 1232   Temp 36.6 °C (97.8 °F) 08/30/24 1215   Pulse 91 08/30/24 1243   Resp 15 08/30/24 1234   SpO2 95 % 08/30/24 1243   Vitals shown include unfiled device data.      Event Time   Out of Recovery 08/30/2024 12:15:00         Pain/Ventura Score: Ventura Score: 10 (8/30/2024 12:15 PM)

## 2024-08-30 NOTE — SUBJECTIVE & OBJECTIVE
Interval History: Patient seen and examined. No acute events overnight. Bilirubin and liver enzymes downtrending. NPO for lap raghu today. Consent in chart.     Medications:  Continuous Infusions:  Scheduled Meds:   [START ON 8/31/2024] allopurinoL  50 mg Oral Every other day    finasteride  5 mg Oral Daily    fludrocortisone  100 mcg Oral QAM    heparin (porcine)  5,000 Units Subcutaneous Q8H    piperacillin-tazobactam (Zosyn) IV (PEDS and ADULTS) (extended infusion is not appropriate)  4.5 g Intravenous Q12H    tamsulosin  0.4 mg Oral Daily     PRN Meds:  Current Facility-Administered Medications:     dextrose 10%, 12.5 g, Intravenous, PRN    dextrose 10%, 25 g, Intravenous, PRN    glucagon (human recombinant), 1 mg, Intramuscular, PRN    insulin aspart U-100, 0-10 Units, Subcutaneous, Q6H PRN    sodium chloride 0.9%, 10 mL, Intravenous, PRN     Review of patient's allergies indicates:   Allergen Reactions    Sulfa (sulfonamide antibiotics) Rash     Objective:     Vital Signs (Most Recent):  Temp: 97.5 °F (36.4 °C) (08/30/24 0300)  Pulse: (!) 42 (08/30/24 0600)  Resp: 15 (08/30/24 0600)  BP: 130/69 (08/30/24 0600)  SpO2: 96 % (08/30/24 0600) Vital Signs (24h Range):  Temp:  [97.4 °F (36.3 °C)-98.1 °F (36.7 °C)] 97.5 °F (36.4 °C)  Pulse:  [42-74] 42  Resp:  [14-26] 15  SpO2:  [90 %-99 %] 96 %  BP: (102-151)/(60-75) 130/69     Weight: 68 kg (149 lb 14.6 oz)  Body mass index is 22.14 kg/m².    Intake/Output - Last 3 Shifts         08/28 0700 08/29 0659 08/29 0700 08/30 0659 08/30 0700 08/31 0659    I.V. (mL/kg) 49.2 (0.7) 20.4 (0.3)     IV Piggyback 343.6 1199.7     Total Intake(mL/kg) 392.8 (5.8) 1220.1 (17.9)     Urine (mL/kg/hr) 225 700 (0.4)     Stool  0     Total Output 225 700     Net +167.8 +520.1            Urine Occurrence  1 x     Stool Occurrence  3 x              Physical Exam  Constitutional:       General: He is not in acute distress.     Appearance: Normal appearance.   HENT:      Head:  Normocephalic and atraumatic.   Cardiovascular:      Rate and Rhythm: Normal rate and regular rhythm.   Pulmonary:      Effort: Pulmonary effort is normal. No respiratory distress.   Abdominal:      General: Abdomen is flat. There is no distension.      Palpations: Abdomen is soft.      Tenderness: There is no abdominal tenderness.      Comments: Well healed midline incision  Well healed robotic incisions   Neurological:      General: No focal deficit present.      Mental Status: He is alert and oriented to person, place, and time.   Psychiatric:         Behavior: Behavior normal.         Thought Content: Thought content normal.          Significant Labs:  I have reviewed all pertinent lab results within the past 24 hours.  CBC:   Recent Labs   Lab 08/30/24 0318   WBC 11.94   RBC 3.90*   HGB 12.3*   HCT 37.5*      MCV 96   MCH 31.5*   MCHC 32.8     CMP:   Recent Labs   Lab 08/30/24 0318   *   CALCIUM 8.2*   ALBUMIN 2.5*   PROT 6.4      K 4.7   CO2 16*      BUN 61*   CREATININE 3.3*   ALKPHOS 190*   *   AST 66*   BILITOT 1.6*       Significant Diagnostics:  I have reviewed all pertinent imaging results/findings within the past 24 hours.

## 2024-08-30 NOTE — BRIEF OP NOTE
[facility] - Cardiac Medical ICU  Brief Operative Note    SUMMARY     Surgery Date: 8/30/2024     Surgeons and Role:     * Jenae Rasmussen MD - Primary     * Shar Palomino MD - Resident - Assisting     * Chula Christopher MD - Resident - Assisting        Pre-op Diagnosis:  Cholangitis [K83.09]    Post-op Diagnosis:  Post-Op Diagnosis Codes:     * Cholangitis [K83.09]    Procedure(s) (LRB):  CHOLECYSTECTOMY, LAPAROSCOPIC (N/A)    Anesthesia: General    Implants:  * No implants in log *    Operative Findings: laparoscopic cholecystectomy. Significant inflammation and omental adhesions to gallbladder. Critical view obtained.     Estimated Blood Loss: 250cc         Specimens:   Specimen (24h ago, onward)       Start     Ordered    08/30/24 1024  Specimen to Pathology, Surgery General Surgery  Once        Comments: Pre-op Diagnosis: Cholangitis [K83.09]Procedure(s):CHOLECYSTECTOMY, LAPAROSCOPIC Number of specimens: 1Name of specimens: Gallbladder     References:    Click here for ordering Quick Tip   Question Answer Comment   Procedure Type: General Surgery    Specimen Class: Routine/Screening    Release to patient Immediate        08/30/24 1024                    [id number]      Chula Christopher MD  General Surgery, PGY-3

## 2024-08-30 NOTE — NURSING TRANSFER
Nursing Transfer Note      8/30/2024   12:07 PM    Nurse giving handoff:Dario hackett Rn  Nurse receiving handoff: Receiving Rn    Reason patient is being transferred: postop    Transfer To: [id number]    Transfer via bed    Transfer with cardiac monitoring    Transported by transport    Transfer Vital Signs:  Blood Pressure:see flowsheet  Heart Rate:  O2:  Temperature:  Respirations:    Telemetry: yes  Order for Tele Monitor? Yes    Additional Lines: n/a      Medicines sent: n/a    Any special needs or follow-up needed:     Patient belongings transferred with patient:  n/a    Chart send with patient: Yes    Notified:     Patient reassessed at: 8/30/24  1  Upon arrival to floor: bed in lowest position

## 2024-08-30 NOTE — PROGRESS NOTES
[facility] - Cardiac Medical ICU  General Surgery  Progress Note    Subjective:     History of Present Illness:  Josh Pinto is a 84 y.o. gentleman with PMH of BPH with urinary obstruction, HTN, CKD (baseline creatinine 3), gout, and RCC s/p left nephrectomy complicated by an internal hernia requiring exploratory laparotomy in 2023 who is admitted to the MICU with cholangitis. He initially presented with nausea, vomiting, and fatigue. Initial Tbili was 7.3 with elevated liver enzymes. Imaging findings were concerning for common bile duct obstruction. ERCP was performed 8/29 which found choledocholithiasis and a stent was left in place. General surgery consulted for evaluation. Tolerates >4 METS. Not on anticoagulation (afib last year when recovering from his nephrectomy).          Post-Op Info:  Procedure(s) (LRB):  ERCP (ENDOSCOPIC RETROGRADE CHOLANGIOPANCREATOGRAPHY) (N/A)   1 Day Post-Op     Interval History: Patient seen and examined. No acute events overnight. Bilirubin and liver enzymes downtrending. NPO for lap raghu today. Consent in chart.     Medications:  Continuous Infusions:  Scheduled Meds:   [START ON 8/31/2024] allopurinoL  50 mg Oral Every other day    finasteride  5 mg Oral Daily    fludrocortisone  100 mcg Oral QAM    heparin (porcine)  5,000 Units Subcutaneous Q8H    piperacillin-tazobactam (Zosyn) IV (PEDS and ADULTS) (extended infusion is not appropriate)  4.5 g Intravenous Q12H    tamsulosin  0.4 mg Oral Daily     PRN Meds:  Current Facility-Administered Medications:     dextrose 10%, 12.5 g, Intravenous, PRN    dextrose 10%, 25 g, Intravenous, PRN    glucagon (human recombinant), 1 mg, Intramuscular, PRN    insulin aspart U-100, 0-10 Units, Subcutaneous, Q6H PRN    sodium chloride 0.9%, 10 mL, Intravenous, PRN     Review of patient's allergies indicates:   Allergen Reactions    Sulfa (sulfonamide antibiotics) Rash     Objective:     Vital Signs (Most Recent):  Temp: 97.5 °F (36.4 °C)  (08/30/24 0300)  Pulse: (!) 42 (08/30/24 0600)  Resp: 15 (08/30/24 0600)  BP: 130/69 (08/30/24 0600)  SpO2: 96 % (08/30/24 0600) Vital Signs (24h Range):  Temp:  [97.4 °F (36.3 °C)-98.1 °F (36.7 °C)] 97.5 °F (36.4 °C)  Pulse:  [42-74] 42  Resp:  [14-26] 15  SpO2:  [90 %-99 %] 96 %  BP: (102-151)/(60-75) 130/69     Weight: 68 kg (149 lb 14.6 oz)  Body mass index is 22.14 kg/m².    Intake/Output - Last 3 Shifts         08/28 0700 08/29 0659 08/29 0700 08/30 0659 08/30 0700 08/31 0659    I.V. (mL/kg) 49.2 (0.7) 20.4 (0.3)     IV Piggyback 343.6 1199.7     Total Intake(mL/kg) 392.8 (5.8) 1220.1 (17.9)     Urine (mL/kg/hr) 225 700 (0.4)     Stool  0     Total Output 225 700     Net +167.8 +520.1            Urine Occurrence  1 x     Stool Occurrence  3 x              Physical Exam  Constitutional:       General: He is not in acute distress.     Appearance: Normal appearance.   HENT:      Head: Normocephalic and atraumatic.   Cardiovascular:      Rate and Rhythm: Normal rate and regular rhythm.   Pulmonary:      Effort: Pulmonary effort is normal. No respiratory distress.   Abdominal:      General: Abdomen is flat. There is no distension.      Palpations: Abdomen is soft.      Tenderness: There is no abdominal tenderness.      Comments: Well healed midline incision  Well healed robotic incisions   Neurological:      General: No focal deficit present.      Mental Status: He is alert and oriented to person, place, and time.   Psychiatric:         Behavior: Behavior normal.         Thought Content: Thought content normal.          Significant Labs:  I have reviewed all pertinent lab results within the past 24 hours.  CBC:   Recent Labs   Lab 08/30/24  0318   WBC 11.94   RBC 3.90*   HGB 12.3*   HCT 37.5*      MCV 96   MCH 31.5*   MCHC 32.8     CMP:   Recent Labs   Lab 08/30/24  0318   *   CALCIUM 8.2*   ALBUMIN 2.5*   PROT 6.4      K 4.7   CO2 16*      BUN 61*   CREATININE 3.3*   ALKPHOS 190*   ALT  177*   AST 66*   BILITOT 1.6*       Significant Diagnostics:  I have reviewed all pertinent imaging results/findings within the past 24 hours.  Assessment/Plan:     * Cholangitis  Josh Pinto is a 84 y.o. gentleman with cholangitis s/p ERCP    - plan to proceed to OR today for lap raghu   - surgical consent in chart  - NPO since midnight  - please call with questions  - ok for DVT ppx        Shar Palomino MD  General Surgery  [facility] - Cardiac Medical ICU

## 2024-08-30 NOTE — NURSING
Nurses Note -- 4 Eyes      8/30/2024   2:25 PM      Skin assessed during: Transfer      [] No Altered Skin Integrity Present    []Prevention Measures Documented      [x] Yes- Altered Skin Integrity Present or Discovered   [] LDA Added if Not in Epic (Describe Wound)   [] New Altered Skin Integrity was Present on Admit and Documented in LDA   [] Wound Image Taken    Wound Care Consulted? No    Attending Nurse: Lolis Loja RN/Staff Member:  MARISELA Haley

## 2024-08-31 LAB
ALBUMIN SERPL BCP-MCNC: 2.3 G/DL (ref 3.5–5.2)
ALP SERPL-CCNC: 162 U/L (ref 55–135)
ALT SERPL W/O P-5'-P-CCNC: 136 U/L (ref 10–44)
ANION GAP SERPL CALC-SCNC: 8 MMOL/L (ref 8–16)
AST SERPL-CCNC: 45 U/L (ref 10–40)
BASOPHILS # BLD AUTO: ABNORMAL K/UL (ref 0–0.2)
BASOPHILS NFR BLD: 0 % (ref 0–1.9)
BILIRUB SERPL-MCNC: 1.1 MG/DL (ref 0.1–1)
BUN SERPL-MCNC: 63 MG/DL (ref 8–23)
CALCIUM SERPL-MCNC: 8.1 MG/DL (ref 8.7–10.5)
CHLORIDE SERPL-SCNC: 114 MMOL/L (ref 95–110)
CO2 SERPL-SCNC: 19 MMOL/L (ref 23–29)
CREAT SERPL-MCNC: 3.3 MG/DL (ref 0.5–1.4)
DIFFERENTIAL METHOD BLD: ABNORMAL
EOSINOPHIL # BLD AUTO: ABNORMAL K/UL (ref 0–0.5)
EOSINOPHIL NFR BLD: 0 % (ref 0–8)
ERYTHROCYTE [DISTWIDTH] IN BLOOD BY AUTOMATED COUNT: 15.2 % (ref 11.5–14.5)
EST. GFR  (NO RACE VARIABLE): 17.9 ML/MIN/1.73 M^2
GLUCOSE SERPL-MCNC: 100 MG/DL (ref 70–110)
HCT VFR BLD AUTO: 39.7 % (ref 40–54)
HGB BLD-MCNC: 12.9 G/DL (ref 14–18)
HYPOCHROMIA BLD QL SMEAR: ABNORMAL
IMM GRANULOCYTES # BLD AUTO: ABNORMAL K/UL (ref 0–0.04)
IMM GRANULOCYTES NFR BLD AUTO: ABNORMAL % (ref 0–0.5)
LYMPHOCYTES # BLD AUTO: ABNORMAL K/UL (ref 1–4.8)
LYMPHOCYTES NFR BLD: 4 % (ref 18–48)
MAGNESIUM SERPL-MCNC: 2.6 MG/DL (ref 1.6–2.6)
MCH RBC QN AUTO: 31.2 PG (ref 27–31)
MCHC RBC AUTO-ENTMCNC: 32.5 G/DL (ref 32–36)
MCV RBC AUTO: 96 FL (ref 82–98)
METAMYELOCYTES NFR BLD MANUAL: 2 %
MONOCYTES # BLD AUTO: ABNORMAL K/UL (ref 0.3–1)
MONOCYTES NFR BLD: 5 % (ref 4–15)
NEUTROPHILS NFR BLD: 81 % (ref 38–73)
NEUTS BAND NFR BLD MANUAL: 8 %
NRBC BLD-RTO: 0 /100 WBC
PHOSPHATE SERPL-MCNC: 4.2 MG/DL (ref 2.7–4.5)
PLATELET # BLD AUTO: 160 K/UL (ref 150–450)
PLATELET BLD QL SMEAR: ABNORMAL
PMV BLD AUTO: 12.2 FL (ref 9.2–12.9)
POCT GLUCOSE: 108 MG/DL (ref 70–110)
POCT GLUCOSE: 120 MG/DL (ref 70–110)
POCT GLUCOSE: 143 MG/DL (ref 70–110)
POCT GLUCOSE: 145 MG/DL (ref 70–110)
POCT GLUCOSE: 95 MG/DL (ref 70–110)
POTASSIUM SERPL-SCNC: 5 MMOL/L (ref 3.5–5.1)
PROT SERPL-MCNC: 5.8 G/DL (ref 6–8.4)
RBC # BLD AUTO: 4.13 M/UL (ref 4.6–6.2)
SODIUM SERPL-SCNC: 141 MMOL/L (ref 136–145)
SPHEROCYTES BLD QL SMEAR: ABNORMAL
WBC # BLD AUTO: 9.35 K/UL (ref 3.9–12.7)

## 2024-08-31 PROCEDURE — 83735 ASSAY OF MAGNESIUM: CPT

## 2024-08-31 PROCEDURE — 25000003 PHARM REV CODE 250: Performed by: NURSE PRACTITIONER

## 2024-08-31 PROCEDURE — 80053 COMPREHEN METABOLIC PANEL: CPT

## 2024-08-31 PROCEDURE — 85027 COMPLETE CBC AUTOMATED: CPT

## 2024-08-31 PROCEDURE — 21400001 HC TELEMETRY ROOM

## 2024-08-31 PROCEDURE — 63600175 PHARM REV CODE 636 W HCPCS

## 2024-08-31 PROCEDURE — 93005 ELECTROCARDIOGRAM TRACING: CPT

## 2024-08-31 PROCEDURE — 85007 BL SMEAR W/DIFF WBC COUNT: CPT

## 2024-08-31 PROCEDURE — 93010 ELECTROCARDIOGRAM REPORT: CPT | Mod: ,,, | Performed by: INTERNAL MEDICINE

## 2024-08-31 PROCEDURE — 63600175 PHARM REV CODE 636 W HCPCS: Performed by: INTERNAL MEDICINE

## 2024-08-31 PROCEDURE — 25000003 PHARM REV CODE 250: Performed by: INTERNAL MEDICINE

## 2024-08-31 PROCEDURE — 20600001 HC STEP DOWN PRIVATE ROOM

## 2024-08-31 PROCEDURE — 84100 ASSAY OF PHOSPHORUS: CPT

## 2024-08-31 PROCEDURE — 36415 COLL VENOUS BLD VENIPUNCTURE: CPT

## 2024-08-31 RX ADMIN — FINASTERIDE 5 MG: 5 TABLET, FILM COATED ORAL at 09:08

## 2024-08-31 RX ADMIN — ALLOPURINOL 50 MG: 300 TABLET ORAL at 08:08

## 2024-08-31 RX ADMIN — HEPARIN SODIUM 5000 UNITS: 5000 INJECTION INTRAVENOUS; SUBCUTANEOUS at 05:08

## 2024-08-31 RX ADMIN — OXYCODONE 5 MG: 5 TABLET ORAL at 02:08

## 2024-08-31 RX ADMIN — HEPARIN SODIUM 5000 UNITS: 5000 INJECTION INTRAVENOUS; SUBCUTANEOUS at 03:08

## 2024-08-31 RX ADMIN — FLUDROCORTISONE ACETATE 100 MCG: 0.1 TABLET ORAL at 06:08

## 2024-08-31 RX ADMIN — FINASTERIDE 5 MG: 5 TABLET, FILM COATED ORAL at 08:08

## 2024-08-31 RX ADMIN — PIPERACILLIN SODIUM AND TAZOBACTAM SODIUM 4.5 G: 4; .5 INJECTION, POWDER, FOR SOLUTION INTRAVENOUS at 02:08

## 2024-08-31 RX ADMIN — TAMSULOSIN HYDROCHLORIDE 0.4 MG: 0.4 CAPSULE ORAL at 08:08

## 2024-08-31 RX ADMIN — HEPARIN SODIUM 5000 UNITS: 5000 INJECTION INTRAVENOUS; SUBCUTANEOUS at 09:08

## 2024-08-31 RX ADMIN — PIPERACILLIN SODIUM AND TAZOBACTAM SODIUM 4.5 G: 4; .5 INJECTION, POWDER, FOR SOLUTION INTRAVENOUS at 03:08

## 2024-08-31 NOTE — PLAN OF CARE
Pt AAOx4, c/o of incisional abdominal pain, PRN Oxycodone given once per pt request with + outcome. Lap sites x5 CDI with dermabond. Pt had a small BM overnight, + for flatulence. VSS, no acute distress noted. POC on going.    Problem: Adult Inpatient Plan of Care  Goal: Plan of Care Review  Outcome: Progressing  Goal: Absence of Hospital-Acquired Illness or Injury  Outcome: Progressing  Goal: Optimal Comfort and Wellbeing  Outcome: Progressing  Goal: Readiness for Transition of Care  Outcome: Progressing     Problem: Fall Injury Risk  Goal: Absence of Fall and Fall-Related Injury  Outcome: Progressing     Problem: Wound  Goal: Optimal Coping  Outcome: Progressing  Goal: Optimal Functional Ability  Outcome: Progressing  Goal: Absence of Infection Signs and Symptoms  Outcome: Progressing  Goal: Improved Oral Intake  Outcome: Progressing  Goal: Optimal Pain Control and Function  Outcome: Progressing  Goal: Skin Health and Integrity  Outcome: Progressing  Goal: Optimal Wound Healing  Outcome: Progressing

## 2024-08-31 NOTE — SUBJECTIVE & OBJECTIVE
Interval History: S/P Lap choly, doing well.    Review of Systems  Objective:     Vital Signs (Most Recent):  Temp: 97.5 °F (36.4 °C) (08/31/24 0750)  Pulse: 60 (08/31/24 0750)  Resp: 14 (08/31/24 0750)  BP: 110/63 (08/31/24 0750)  SpO2: 95 % (08/31/24 0750) Vital Signs (24h Range):  Temp:  [97.4 °F (36.3 °C)-97.8 °F (36.6 °C)] 97.5 °F (36.4 °C)  Pulse:  [] 60  Resp:  [10-18] 14  SpO2:  [91 %-99 %] 95 %  BP: (105-151)/(63-84) 110/63     Weight: 68 kg (149 lb 14.6 oz)  Body mass index is 22.14 kg/m².    Intake/Output Summary (Last 24 hours) at 8/31/2024 0909  Last data filed at 8/31/2024 0729  Gross per 24 hour   Intake 849.71 ml   Output 1375 ml   Net -525.29 ml         Physical Exam  Constitutional:       General: He is not in acute distress.     Appearance: Normal appearance.   HENT:      Head: Normocephalic and atraumatic.   Cardiovascular:      Rate and Rhythm: Normal rate and regular rhythm.   Pulmonary:      Effort: Pulmonary effort is normal. No respiratory distress.   Abdominal:      General: Abdomen is flat. There is no distension.      Palpations: Abdomen is soft.      Tenderness: There is no abdominal tenderness.      Comments: Well healed midline incision  Well healed robotic incisions   Neurological:      General: No focal deficit present.      Mental Status: He is alert and oriented to person, place, and time.   Psychiatric:         Behavior: Behavior normal.         Thought Content: Thought content normal.             Significant Labs: All pertinent labs within the past 24 hours have been reviewed.  BMP:   Recent Labs   Lab 08/31/24  0512         K 5.0   *   CO2 19*   BUN 63*   CREATININE 3.3*   CALCIUM 8.1*   MG 2.6       Significant Imaging: I have reviewed all pertinent imaging results/findings within the past 24 hours.

## 2024-08-31 NOTE — PROGRESS NOTES
[facility] - Telemetry Stepdown  General Surgery  Progress Note    Subjective:     History of Present Illness:  Josh Pinto is a 84 y.o. gentleman with PMH of BPH with urinary obstruction, HTN, CKD (baseline creatinine 3), gout, and RCC s/p left nephrectomy complicated by an internal hernia requiring exploratory laparotomy in 2023 who is admitted to the MICU with cholangitis. He initially presented with nausea, vomiting, and fatigue. Initial Tbili was 7.3 with elevated liver enzymes. Imaging findings were concerning for common bile duct obstruction. ERCP was performed 8/29 which found choledocholithiasis and a stent was left in place. General surgery consulted for evaluation. Tolerates >4 METS. Not on anticoagulation (afib last year when recovering from his nephrectomy).          Post-Op Info:  Procedure(s) (LRB):  CHOLECYSTECTOMY, LAPAROSCOPIC (N/A)   1 Day Post-Op     Interval History:  Pain well controlled, surgical incisions appropriate. Tenderness appropriate. Awaiting labs this am    Medications:  Continuous Infusions:  Scheduled Meds:   allopurinoL  50 mg Oral Every other day    finasteride  5 mg Oral Daily    fludrocortisone  100 mcg Oral QAM    heparin (porcine)  5,000 Units Subcutaneous Q8H    piperacillin-tazobactam (Zosyn) IV (PEDS and ADULTS) (extended infusion is not appropriate)  4.5 g Intravenous Q12H    tamsulosin  0.4 mg Oral Daily     PRN Meds:  Current Facility-Administered Medications:     dextrose 10%, 12.5 g, Intravenous, PRN    dextrose 10%, 25 g, Intravenous, PRN    glucagon (human recombinant), 1 mg, Intramuscular, PRN    insulin aspart U-100, 0-10 Units, Subcutaneous, Q6H PRN    oxyCODONE, 5 mg, Oral, Q6H PRN    sodium chloride 0.9%, 10 mL, Intravenous, PRN     Review of patient's allergies indicates:   Allergen Reactions    Sulfa (sulfonamide antibiotics) Rash     Objective:     Vital Signs (Most Recent):  Temp: 97.5 °F (36.4 °C) (08/31/24 0441)  Pulse: (!) 56 (08/31/24 0441)  Resp: 16  (08/31/24 0441)  BP: 105/68 (08/31/24 0441)  SpO2: 97 % (08/31/24 0441) Vital Signs (24h Range):  Temp:  [97.4 °F (36.3 °C)-97.8 °F (36.6 °C)] 97.5 °F (36.4 °C)  Pulse:  [] 56  Resp:  [10-18] 16  SpO2:  [91 %-99 %] 97 %  BP: (105-159)/(65-84) 105/68     Weight: 68 kg (149 lb 14.6 oz)  Body mass index is 22.14 kg/m².    Intake/Output - Last 3 Shifts         08/29 0700 08/30 0659 08/30 0700 08/31 0659    I.V. (mL/kg) 20.4 (0.3)     IV Piggyback 1199.7 500    Total Intake(mL/kg) 1220.1 (17.9) 500 (7.4)    Urine (mL/kg/hr) 700 (0.4) 775 (0.5)    Stool 0 0    Total Output 700 775    Net +520.1 -275          Urine Occurrence 1 x     Stool Occurrence 3 x 1 x             Physical Exam  Constitutional:       General: He is not in acute distress.     Appearance: Normal appearance.   HENT:      Head: Normocephalic and atraumatic.   Cardiovascular:      Rate and Rhythm: Normal rate and regular rhythm.   Pulmonary:      Effort: Pulmonary effort is normal. No respiratory distress.   Abdominal:      General: Abdomen is flat. There is no distension.      Palpations: Abdomen is soft.      Tenderness: There is no abdominal tenderness.      Comments: Well healed midline incision  Well healed robotic incisions   Neurological:      General: No focal deficit present.      Mental Status: He is alert and oriented to person, place, and time.   Psychiatric:         Behavior: Behavior normal.         Thought Content: Thought content normal.          Significant Labs:  I have reviewed all pertinent lab results within the past 24 hours.  CBC:   Recent Labs   Lab 08/31/24  0512   WBC 9.35   RBC 4.13*   HGB 12.9*   HCT 39.7*      MCV 96   MCH 31.2*   MCHC 32.5     CMP:   Recent Labs   Lab 08/30/24  0318   *   CALCIUM 8.2*   ALBUMIN 2.5*   PROT 6.4      K 4.7   CO2 16*      BUN 61*   CREATININE 3.3*   ALKPHOS 190*   *   AST 66*   BILITOT 1.6*       Significant Diagnostics:  I have reviewed all pertinent  imaging results/findings within the past 24 hours.  Assessment/Plan:     * Cholangitis  Josh Pinto is a 84 y.o. gentleman with cholangitis s/p ERCP. Sp lap raghu 8/30. Doing well post operatively.     - ok to advance diet as appropriate per primary  - MMD pain control  - abx per primary   - will sign off and see pt in clinic in 2 weeks  - please call with questions  - ok for DVT ppx        Jolene Ruvalcaba MD  General Surgery  [facility] - Telemetry Stepdown

## 2024-08-31 NOTE — ASSESSMENT & PLAN NOTE
Abdominal ultrasound noted gallstones with findings concerning for acute cholecystitis.  There are numerous mobile gallstones.  Gallbladder wall is mildly thickened measuring 4 mm.  No pericholecystic fluid is present.  Common duct is dilated measuring 14 mm.  Moderate intrahepatic biliary ductal dilation.     CT abdomen and pelvis without contrast noted intra and extrahepatic biliary ductal dilatation of uncertain etiology.  Follow-up MRCP would be helpful.  Suspect the presence of gallstones.  Massive enlargement of the prostate similar to previous study.  Previous left nephrectomy.  Stable rim calcified structure at the midpole of the right kidney.  There are distal colonic diverticula.  No evidence of acute diverticulitis.  No bowel obstruction no inflammation bowel.  Appendix is present.  No ascites.     -- AES consulted. Appreciate assistance   -- NPO   -- Continue broad spectrum abx and follow up infectious work up   -- Trending LFTs and renal function   Sp lap choly

## 2024-08-31 NOTE — SUBJECTIVE & OBJECTIVE
Interval History:  Pain well controlled, surgical incisions appropriate. Tenderness appropriate. Awaiting labs this am    Medications:  Continuous Infusions:  Scheduled Meds:   allopurinoL  50 mg Oral Every other day    finasteride  5 mg Oral Daily    fludrocortisone  100 mcg Oral QAM    heparin (porcine)  5,000 Units Subcutaneous Q8H    piperacillin-tazobactam (Zosyn) IV (PEDS and ADULTS) (extended infusion is not appropriate)  4.5 g Intravenous Q12H    tamsulosin  0.4 mg Oral Daily     PRN Meds:  Current Facility-Administered Medications:     dextrose 10%, 12.5 g, Intravenous, PRN    dextrose 10%, 25 g, Intravenous, PRN    glucagon (human recombinant), 1 mg, Intramuscular, PRN    insulin aspart U-100, 0-10 Units, Subcutaneous, Q6H PRN    oxyCODONE, 5 mg, Oral, Q6H PRN    sodium chloride 0.9%, 10 mL, Intravenous, PRN     Review of patient's allergies indicates:   Allergen Reactions    Sulfa (sulfonamide antibiotics) Rash     Objective:     Vital Signs (Most Recent):  Temp: 97.5 °F (36.4 °C) (08/31/24 0441)  Pulse: (!) 56 (08/31/24 0441)  Resp: 16 (08/31/24 0441)  BP: 105/68 (08/31/24 0441)  SpO2: 97 % (08/31/24 0441) Vital Signs (24h Range):  Temp:  [97.4 °F (36.3 °C)-97.8 °F (36.6 °C)] 97.5 °F (36.4 °C)  Pulse:  [] 56  Resp:  [10-18] 16  SpO2:  [91 %-99 %] 97 %  BP: (105-159)/(65-84) 105/68     Weight: 68 kg (149 lb 14.6 oz)  Body mass index is 22.14 kg/m².    Intake/Output - Last 3 Shifts         08/29 0700 08/30 0659 08/30 0700 08/31 0659    I.V. (mL/kg) 20.4 (0.3)     IV Piggyback 1199.7 500    Total Intake(mL/kg) 1220.1 (17.9) 500 (7.4)    Urine (mL/kg/hr) 700 (0.4) 775 (0.5)    Stool 0 0    Total Output 700 775    Net +520.1 -275          Urine Occurrence 1 x     Stool Occurrence 3 x 1 x             Physical Exam  Constitutional:       General: He is not in acute distress.     Appearance: Normal appearance.   HENT:      Head: Normocephalic and atraumatic.   Cardiovascular:      Rate and Rhythm: Normal  rate and regular rhythm.   Pulmonary:      Effort: Pulmonary effort is normal. No respiratory distress.   Abdominal:      General: Abdomen is flat. There is no distension.      Palpations: Abdomen is soft.      Tenderness: There is no abdominal tenderness.      Comments: Well healed midline incision  Well healed robotic incisions   Neurological:      General: No focal deficit present.      Mental Status: He is alert and oriented to person, place, and time.   Psychiatric:         Behavior: Behavior normal.         Thought Content: Thought content normal.          Significant Labs:  I have reviewed all pertinent lab results within the past 24 hours.  CBC:   Recent Labs   Lab 08/31/24  0512   WBC 9.35   RBC 4.13*   HGB 12.9*   HCT 39.7*      MCV 96   MCH 31.2*   MCHC 32.5     CMP:   Recent Labs   Lab 08/30/24  0318   *   CALCIUM 8.2*   ALBUMIN 2.5*   PROT 6.4      K 4.7   CO2 16*      BUN 61*   CREATININE 3.3*   ALKPHOS 190*   *   AST 66*   BILITOT 1.6*       Significant Diagnostics:  I have reviewed all pertinent imaging results/findings within the past 24 hours.

## 2024-08-31 NOTE — ASSESSMENT & PLAN NOTE
Josh Pinto is a 84 y.o. gentleman with cholangitis s/p ERCP. Sp lap raghu 8/30. Doing well post operatively.     - ok to advance diet as appropriate per primary  - Will follow up labs this am. If labs look appropriate, we will plan to sign off and see pt in clinic in 2 weeks  - please call with questions  - ok for DVT ppx

## 2024-08-31 NOTE — PROGRESS NOTES
[facility] - Telemetry [facility] Medicine  Progress Note    Patient Name: Josh Pinto  MRN: [id number]  Patient Class: IP- Inpatient   Admission Date: 8/28/2024  Length of Stay: 3 days  Attending Physician: David Yi MD  Primary Care Provider: Hali Burrell MD        Subjective:     Principal Problem:Cholangitis        HPI:  No notes on file    Overview/Hospital Course:  No notes on file    Interval History: S/P Lap choly, doing well.    Review of Systems  Objective:     Vital Signs (Most Recent):  Temp: 97.5 °F (36.4 °C) (08/31/24 0750)  Pulse: 60 (08/31/24 0750)  Resp: 14 (08/31/24 0750)  BP: 110/63 (08/31/24 0750)  SpO2: 95 % (08/31/24 0750) Vital Signs (24h Range):  Temp:  [97.4 °F (36.3 °C)-97.8 °F (36.6 °C)] 97.5 °F (36.4 °C)  Pulse:  [] 60  Resp:  [10-18] 14  SpO2:  [91 %-99 %] 95 %  BP: (105-151)/(63-84) 110/63     Weight: 68 kg (149 lb 14.6 oz)  Body mass index is 22.14 kg/m².    Intake/Output Summary (Last 24 hours) at 8/31/2024 0909  Last data filed at 8/31/2024 0729  Gross per 24 hour   Intake 849.71 ml   Output 1375 ml   Net -525.29 ml         Physical Exam  Constitutional:       General: He is not in acute distress.     Appearance: Normal appearance.   HENT:      Head: Normocephalic and atraumatic.   Cardiovascular:      Rate and Rhythm: Normal rate and regular rhythm.   Pulmonary:      Effort: Pulmonary effort is normal. No respiratory distress.   Abdominal:      General: Abdomen is flat. There is no distension.      Palpations: Abdomen is soft.      Tenderness: There is no abdominal tenderness.      Comments: Well healed midline incision  Well healed robotic incisions   Neurological:      General: No focal deficit present.      Mental Status: He is alert and oriented to person, place, and time.   Psychiatric:         Behavior: Behavior normal.         Thought Content: Thought content normal.             Significant Labs: All pertinent labs within the past 24 hours have been  reviewed.  BMP:   Recent Labs   Lab 08/31/24  0512         K 5.0   *   CO2 19*   BUN 63*   CREATININE 3.3*   CALCIUM 8.1*   MG 2.6       Significant Imaging: I have reviewed all pertinent imaging results/findings within the past 24 hours.    Assessment/Plan:      * Cholangitis  Abdominal ultrasound noted gallstones with findings concerning for acute cholecystitis.  There are numerous mobile gallstones.  Gallbladder wall is mildly thickened measuring 4 mm.  No pericholecystic fluid is present.  Common duct is dilated measuring 14 mm.  Moderate intrahepatic biliary ductal dilation.     CT abdomen and pelvis without contrast noted intra and extrahepatic biliary ductal dilatation of uncertain etiology.  Follow-up MRCP would be helpful.  Suspect the presence of gallstones.  Massive enlargement of the prostate similar to previous study.  Previous left nephrectomy.  Stable rim calcified structure at the midpole of the right kidney.  There are distal colonic diverticula.  No evidence of acute diverticulitis.  No bowel obstruction no inflammation bowel.  Appendix is present.  No ascites.     -- AES consulted. Appreciate assistance   -- NPO   -- Continue broad spectrum abx and follow up infectious work up   -- Trending LFTs and renal function   Sp lap choly      Stage 4 chronic kidney disease  Baseline Cr: 2.8-3  Cr at admission to ICU: 3   Estimated Creatinine Clearance: 18.3 mL/min (A) (based on SCr of 3 mg/dL (H)).     -- CMP daily and trend   -- Avoid nephrotoxins   -- Strict I&O   -- Renally dose all medications to appropriate GFR / CrCl   -- MAP > 65 and hgb > 7 goals       VTE Risk Mitigation (From admission, onward)           Ordered     heparin (porcine) injection 5,000 Units  Every 8 hours         08/28/24 2339     IP VTE HIGH RISK PATIENT  Once         08/28/24 2339     Place sequential compression device  Until discontinued         08/28/24 2339                    Discharge Planning   RAMÓN:  9/2/2024     Code Status: Full Code   Is the patient medically ready for discharge?:     Reason for patient still in hospital (select all that apply): Patient unstable  Discharge Plan A: Home Health                  David Yi MD  Department of Hospital Medicine   [facility] - Telemetry Stepdown

## 2024-09-01 LAB
ALBUMIN SERPL BCP-MCNC: 2.1 G/DL (ref 3.5–5.2)
ALP SERPL-CCNC: 143 U/L (ref 55–135)
ALT SERPL W/O P-5'-P-CCNC: 95 U/L (ref 10–44)
ANION GAP SERPL CALC-SCNC: 7 MMOL/L (ref 8–16)
AST SERPL-CCNC: 26 U/L (ref 10–40)
BACTERIA BLD CULT: ABNORMAL
BASOPHILS # BLD AUTO: 0.02 K/UL (ref 0–0.2)
BASOPHILS NFR BLD: 0.3 % (ref 0–1.9)
BILIRUB SERPL-MCNC: 1.2 MG/DL (ref 0.1–1)
BUN SERPL-MCNC: 57 MG/DL (ref 8–23)
CALCIUM SERPL-MCNC: 8.2 MG/DL (ref 8.7–10.5)
CHLORIDE SERPL-SCNC: 115 MMOL/L (ref 95–110)
CO2 SERPL-SCNC: 18 MMOL/L (ref 23–29)
CREAT SERPL-MCNC: 3 MG/DL (ref 0.5–1.4)
DIFFERENTIAL METHOD BLD: ABNORMAL
EOSINOPHIL # BLD AUTO: 0.2 K/UL (ref 0–0.5)
EOSINOPHIL NFR BLD: 2.9 % (ref 0–8)
ERYTHROCYTE [DISTWIDTH] IN BLOOD BY AUTOMATED COUNT: 15.1 % (ref 11.5–14.5)
EST. GFR  (NO RACE VARIABLE): 20.1 ML/MIN/1.73 M^2
GLUCOSE SERPL-MCNC: 84 MG/DL (ref 70–110)
HCT VFR BLD AUTO: 37.3 % (ref 40–54)
HGB BLD-MCNC: 12 G/DL (ref 14–18)
IMM GRANULOCYTES # BLD AUTO: 0.08 K/UL (ref 0–0.04)
IMM GRANULOCYTES NFR BLD AUTO: 1 % (ref 0–0.5)
LYMPHOCYTES # BLD AUTO: 0.7 K/UL (ref 1–4.8)
LYMPHOCYTES NFR BLD: 9.1 % (ref 18–48)
MAGNESIUM SERPL-MCNC: 2.5 MG/DL (ref 1.6–2.6)
MCH RBC QN AUTO: 30.9 PG (ref 27–31)
MCHC RBC AUTO-ENTMCNC: 32.2 G/DL (ref 32–36)
MCV RBC AUTO: 96 FL (ref 82–98)
MONOCYTES # BLD AUTO: 1 K/UL (ref 0.3–1)
MONOCYTES NFR BLD: 12 % (ref 4–15)
NEUTROPHILS # BLD AUTO: 6 K/UL (ref 1.8–7.7)
NEUTROPHILS NFR BLD: 74.7 % (ref 38–73)
NRBC BLD-RTO: 0 /100 WBC
OHS QRS DURATION: 78 MS
OHS QTC CALCULATION: 441 MS
PHOSPHATE SERPL-MCNC: 2.3 MG/DL (ref 2.7–4.5)
PLATELET # BLD AUTO: 144 K/UL (ref 150–450)
PMV BLD AUTO: 12.6 FL (ref 9.2–12.9)
POCT GLUCOSE: 106 MG/DL (ref 70–110)
POCT GLUCOSE: 172 MG/DL (ref 70–110)
POCT GLUCOSE: 85 MG/DL (ref 70–110)
POCT GLUCOSE: 94 MG/DL (ref 70–110)
POTASSIUM SERPL-SCNC: 4.3 MMOL/L (ref 3.5–5.1)
PROT SERPL-MCNC: 5.3 G/DL (ref 6–8.4)
RBC # BLD AUTO: 3.88 M/UL (ref 4.6–6.2)
SODIUM SERPL-SCNC: 140 MMOL/L (ref 136–145)
WBC # BLD AUTO: 7.99 K/UL (ref 3.9–12.7)

## 2024-09-01 PROCEDURE — 20600001 HC STEP DOWN PRIVATE ROOM

## 2024-09-01 PROCEDURE — 94761 N-INVAS EAR/PLS OXIMETRY MLT: CPT

## 2024-09-01 PROCEDURE — 36415 COLL VENOUS BLD VENIPUNCTURE: CPT

## 2024-09-01 PROCEDURE — 63600175 PHARM REV CODE 636 W HCPCS

## 2024-09-01 PROCEDURE — 25000003 PHARM REV CODE 250: Performed by: INTERNAL MEDICINE

## 2024-09-01 PROCEDURE — 84100 ASSAY OF PHOSPHORUS: CPT

## 2024-09-01 PROCEDURE — 21400001 HC TELEMETRY ROOM

## 2024-09-01 PROCEDURE — 85025 COMPLETE CBC W/AUTO DIFF WBC: CPT

## 2024-09-01 PROCEDURE — 27000207 HC ISOLATION

## 2024-09-01 PROCEDURE — 25000003 PHARM REV CODE 250: Performed by: HOSPITALIST

## 2024-09-01 PROCEDURE — 80053 COMPREHEN METABOLIC PANEL: CPT

## 2024-09-01 PROCEDURE — 99223 1ST HOSP IP/OBS HIGH 75: CPT | Mod: GC,,, | Performed by: INTERNAL MEDICINE

## 2024-09-01 PROCEDURE — 83735 ASSAY OF MAGNESIUM: CPT

## 2024-09-01 PROCEDURE — 25000003 PHARM REV CODE 250: Performed by: NURSE PRACTITIONER

## 2024-09-01 PROCEDURE — 63600175 PHARM REV CODE 636 W HCPCS: Performed by: INTERNAL MEDICINE

## 2024-09-01 RX ADMIN — HEPARIN SODIUM 5000 UNITS: 5000 INJECTION INTRAVENOUS; SUBCUTANEOUS at 05:09

## 2024-09-01 RX ADMIN — HEPARIN SODIUM 5000 UNITS: 5000 INJECTION INTRAVENOUS; SUBCUTANEOUS at 02:09

## 2024-09-01 RX ADMIN — HEPARIN SODIUM 5000 UNITS: 5000 INJECTION INTRAVENOUS; SUBCUTANEOUS at 09:09

## 2024-09-01 RX ADMIN — PIPERACILLIN SODIUM AND TAZOBACTAM SODIUM 4.5 G: 4; .5 INJECTION, POWDER, FOR SOLUTION INTRAVENOUS at 02:09

## 2024-09-01 RX ADMIN — FLUDROCORTISONE ACETATE 100 MCG: 0.1 TABLET ORAL at 06:09

## 2024-09-01 RX ADMIN — Medication 1 CAPSULE: at 10:09

## 2024-09-01 RX ADMIN — FINASTERIDE 5 MG: 5 TABLET, FILM COATED ORAL at 09:09

## 2024-09-01 RX ADMIN — TAMSULOSIN HYDROCHLORIDE 0.4 MG: 0.4 CAPSULE ORAL at 09:09

## 2024-09-01 NOTE — HPI
"Mr. Pinto is a 84M with PMH of HTHN, CKD4, BPH, gout, RCC s/p L nephrectomy c/b hernia requiring exlap in 2023, admitted with cholangitis, now s/p ERCP and lap raghu on 8/30, found to be bacteremic also. Infectious disease consulted for "Klebsiella bacteremia/s/p cholycystectomy/duration of abx".     Patient underwent ERCP on 8/30 with biliary sphincterotomy, balloon extraction, plastic stent placed. Underwent laparoscopic cholecystectomy on the same day. 8/28 BCX with pseudomonas on BCID, but only kleb oxytoca on actual growth (resistant to FQs, ampicillin, tetracycline, bactrim, intermediate to imipenem). Patient is currently on zosyn (since 8/28). Repeat BCX from 8/29 NGTD. Is afebrile, on room air / 2L, hemodynamically stable, no leukocytosis.     "

## 2024-09-01 NOTE — PLAN OF CARE
Outpatient Antibiotic Therapy Plan:    Please send referral to [facility].    1) Infection: kleb oxytoca/pseudomonas bacteremia    2) Discharge Antibiotics:    Intravenous antibiotics:  Zosyn 4.5g IV q 12 hours OR via continuous infusion    3) Therapy Duration:  2 weeks    Estimated end date of IV antibiotics: 09/12/24    4) Outpatient Weekly Labs:    Order the following labs to be drawn on Mondays:   CBC  CMP     5) Fax Lab Results to Infectious Diseases Provider: Dr. Pizarro    [facility] Fax Number: [phone]    6) Outpatient Infectious Diseases Follow-up    Follow-up appointment will be arranged by the ID clinic and will be found in the patient's appointments tab.    Prior to discharge, please ensure the patient's follow-up has been scheduled.    If there is still no follow-up scheduled prior to discharge, please send an [email or web address] message to [facility] Clinical Pool or Call Infectious Diseases Dept.

## 2024-09-01 NOTE — SUBJECTIVE & OBJECTIVE
Interval History: Doing well, will discuss dc with family.    Review of Systems  Objective:     Vital Signs (Most Recent):  Temp: 98 °F (36.7 °C) (09/01/24 0723)  Pulse: 63 (09/01/24 0723)  Resp: 19 (09/01/24 0723)  BP: (!) 153/79 (09/01/24 0723)  SpO2: (!) 92 % (09/01/24 0723) Vital Signs (24h Range):  Temp:  [97.9 °F (36.6 °C)-98.5 °F (36.9 °C)] 98 °F (36.7 °C)  Pulse:  [] 63  Resp:  [16-19] 19  SpO2:  [90 %-100 %] 92 %  BP: (126-153)/(65-95) 153/79     Weight: 68 kg (149 lb 14.6 oz)  Body mass index is 22.14 kg/m².    Intake/Output Summary (Last 24 hours) at 9/1/2024 0958  Last data filed at 9/1/2024 0633  Gross per 24 hour   Intake 199.61 ml   Output 700 ml   Net -500.39 ml         Physical Exam  Constitutional:       General: He is not in acute distress.     Appearance: Normal appearance.   HENT:      Head: Normocephalic and atraumatic.   Cardiovascular:      Rate and Rhythm: Normal rate and regular rhythm.   Pulmonary:      Effort: Pulmonary effort is normal. No respiratory distress.   Abdominal:      General: Abdomen is flat. There is no distension.      Palpations: Abdomen is soft.      Tenderness: There is no abdominal tenderness.      Comments: Well healed midline incision  Well healed robotic incisions   Neurological:      General: No focal deficit present.      Mental Status: He is alert and oriented to person, place, and time.   Psychiatric:         Behavior: Behavior normal.         Thought Content: Thought content normal.             Significant Labs: All pertinent labs within the past 24 hours have been reviewed.  BMP:   Recent Labs   Lab 09/01/24  0420   GLU 84      K 4.3   *   CO2 18*   BUN 57*   CREATININE 3.0*   CALCIUM 8.2*   MG 2.5       Significant Imaging: I have reviewed all pertinent imaging results/findings within the past 24 hours.

## 2024-09-01 NOTE — PROGRESS NOTES
[facility] - Telemetry [facility] Medicine  Progress Note    Patient Name: Josh Pinto  MRN: [id number]  Patient Class: IP- Inpatient   Admission Date: 8/28/2024  Length of Stay: 4 days  Attending Physician: David Yi MD  Primary Care Provider: Hali Burrell MD        Subjective:     Principal Problem:Cholangitis        HPI:  No notes on file    Overview/Hospital Course:  No notes on file    Interval History: Doing well, will discuss dc with family.    Review of Systems  Objective:     Vital Signs (Most Recent):  Temp: 98 °F (36.7 °C) (09/01/24 0723)  Pulse: 63 (09/01/24 0723)  Resp: 19 (09/01/24 0723)  BP: (!) 153/79 (09/01/24 0723)  SpO2: (!) 92 % (09/01/24 0723) Vital Signs (24h Range):  Temp:  [97.9 °F (36.6 °C)-98.5 °F (36.9 °C)] 98 °F (36.7 °C)  Pulse:  [] 63  Resp:  [16-19] 19  SpO2:  [90 %-100 %] 92 %  BP: (126-153)/(65-95) 153/79     Weight: 68 kg (149 lb 14.6 oz)  Body mass index is 22.14 kg/m².    Intake/Output Summary (Last 24 hours) at 9/1/2024 0958  Last data filed at 9/1/2024 0633  Gross per 24 hour   Intake 199.61 ml   Output 700 ml   Net -500.39 ml         Physical Exam  Constitutional:       General: He is not in acute distress.     Appearance: Normal appearance.   HENT:      Head: Normocephalic and atraumatic.   Cardiovascular:      Rate and Rhythm: Normal rate and regular rhythm.   Pulmonary:      Effort: Pulmonary effort is normal. No respiratory distress.   Abdominal:      General: Abdomen is flat. There is no distension.      Palpations: Abdomen is soft.      Tenderness: There is no abdominal tenderness.      Comments: Well healed midline incision  Well healed robotic incisions   Neurological:      General: No focal deficit present.      Mental Status: He is alert and oriented to person, place, and time.   Psychiatric:         Behavior: Behavior normal.         Thought Content: Thought content normal.             Significant Labs: All pertinent labs within the past 24  hours have been reviewed.  BMP:   Recent Labs   Lab 09/01/24  0420   GLU 84      K 4.3   *   CO2 18*   BUN 57*   CREATININE 3.0*   CALCIUM 8.2*   MG 2.5       Significant Imaging: I have reviewed all pertinent imaging results/findings within the past 24 hours.    Assessment/Plan:      * Cholangitis  Abdominal ultrasound noted gallstones with findings concerning for acute cholecystitis.  There are numerous mobile gallstones.  Gallbladder wall is mildly thickened measuring 4 mm.  No pericholecystic fluid is present.  Common duct is dilated measuring 14 mm.  Moderate intrahepatic biliary ductal dilation.     CT abdomen and pelvis without contrast noted intra and extrahepatic biliary ductal dilatation of uncertain etiology.  Follow-up MRCP would be helpful.  Suspect the presence of gallstones.  Massive enlargement of the prostate similar to previous study.  Previous left nephrectomy.  Stable rim calcified structure at the midpole of the right kidney.  There are distal colonic diverticula.  No evidence of acute diverticulitis.  No bowel obstruction no inflammation bowel.  Appendix is present.  No ascites.     -- AES consulted. Appreciate assistance   -- NPO   -- Continue broad spectrum abx and follow up infectious work up   -- Trending LFTs and renal function   Sp lap choly      Stage 4 chronic kidney disease  Baseline Cr: 2.8-3  Cr at admission to ICU: 3   Estimated Creatinine Clearance: 18.3 mL/min (A) (based on SCr of 3 mg/dL (H)).     -- CMP daily and trend   -- Avoid nephrotoxins   -- Strict I&O   -- Renally dose all medications to appropriate GFR / CrCl   -- MAP > 65 and hgb > 7 goals       VTE Risk Mitigation (From admission, onward)           Ordered     heparin (porcine) injection 5,000 Units  Every 8 hours         08/28/24 2339     IP VTE HIGH RISK PATIENT  Once         08/28/24 2339     Place sequential compression device  Until discontinued         08/28/24 2339                    Discharge  Planning   RAMÓN: 9/2/2024     Code Status: Full Code   Is the patient medically ready for discharge?:     Reason for patient still in hospital (select all that apply): Patient unstable  Discharge Plan A: Home Health                  David Yi MD  Department of Hospital Medicine   [facility] - Telemetry Stepdown

## 2024-09-01 NOTE — SUBJECTIVE & OBJECTIVE
Past Medical History:   Diagnosis Date    Allergy     Arthritis     Cataract     Hypertension        Past Surgical History:   Procedure Laterality Date    BLADDER FULGURATION N/A 8/19/2023    Procedure: FULGURATION, BLADDER;  Surgeon: Abdulaziz Garcia MD;  Location: [facility] OR;  Service: Urology;  Laterality: N/A;    CYSTOSCOPY N/A 8/19/2023    Procedure: CYSTOSCOPY;  Surgeon: Abdulaziz Garcia MD;  Location: [facility] OR;  Service: Urology;  Laterality: N/A;    ERCP N/A 8/29/2024    Procedure: ERCP (ENDOSCOPIC RETROGRADE CHOLANGIOPANCREATOGRAPHY);  Surgeon: Truman Vargas MD;  Location: [facility] ENDO (2ND FLR);  Service: Endoscopy;  Laterality: N/A;    ESOPHAGOGASTRODUODENOSCOPY N/A 9/20/2023    Procedure: EGD (ESOPHAGOGASTRODUODENOSCOPY);  Surgeon: Varinder Melo MD;  Location: [facility] ENDO;  Service: Gastroenterology;  Laterality: N/A;    HERNIA REPAIR N/A 9/3/2023    Procedure: REPAIR, HERNIA INTERNAL;  Surgeon: Andrew Juares MD;  Location: [facility] OR;  Service: General;  Laterality: N/A;    INSERTION OF CATHETER N/A 8/19/2023    Procedure: INSERTION, CATHETER;  Surgeon: Abdulaziz Garcia MD;  Location: [facility] OR;  Service: Urology;  Laterality: N/A;    knee surgery      right knee    LAPAROSCOPIC ROBOT-ASSISTED SURGICAL REMOVAL OF KIDNEY USING DA EVIN XI Left 8/20/2023    Procedure: XI ROBOTIC NEPHRECTOMY;  Surgeon: Abdulaziz Garcia MD;  Location: [facility] OR;  Service: Urology;  Laterality: Left;    LAPAROTOMY, EXPLORATORY N/A 9/3/2023    Procedure: LAPAROTOMY, EXPLORATORY;  Surgeon: Andrew Juares MD;  Location: [facility] OR;  Service: General;  Laterality: N/A;       Review of patient's allergies indicates:   Allergen Reactions    Sulfa (sulfonamide antibiotics) Rash       Medications:  Medications Prior to Admission   Medication Sig    allopurinoL (ZYLOPRIM) 100 MG tablet Take a half a tablet every other day due to your chronic kidney disease    cholecalciferol, vitamin D3, (VITAMIN D3) 125 mcg (5,000 unit) Tab  Take 5,000 Units by mouth once daily.    colchicine (COLCRYS) 0.6 mg tablet 1.2 mg at the first sign of  gout flare, followed in 1 hour with a single dose of 0.6 mg. repeat treatment should not occur for at least 14 days. Use with caution due to chronic kidney disease    finasteride (PROSCAR) 5 mg tablet Take 1 tablet (5 mg total) by mouth once daily.    fludrocortisone (FLORINEF) 0.1 mg Tab Take 100 mcg by mouth every morning.    iron-vitamin C 100-250 mg, ICAR-C, 100-250 mg Tab Take 1 tablet by mouth every morning.    magnesium oxide (MAG-OX) 250 mg magnesium Tab SMARTSI caplet By Mouth Every Morning    metoprolol tartrate (LOPRESSOR) 25 MG tablet Take 12.5 mg by mouth 2 (two) times daily.    pantoprazole (PROTONIX) 40 MG tablet Take 1 tablet (40 mg total) by mouth 2 (two) times daily before meals. (Patient taking differently: Take 40 mg by mouth once daily.)    potassium chloride SA (K-DUR,KLOR-CON) 20 MEQ tablet Take 20 mEq by mouth every morning.    tamsulosin (FLOMAX) 0.4 mg Cap Take 1 capsule (0.4 mg total) by mouth once daily.     Antibiotics (From admission, onward)      Start     Stop Route Frequency Ordered    24 0100  piperacillin-tazobactam (ZOSYN) 4.5 g in D5W 100 mL IVPB (MB+)         -- IV Every 12 hours (non-standard times) 24 2354          Antifungals (From admission, onward)      None          Antivirals (From admission, onward)      None             Immunization History   Administered Date(s) Administered    Influenza (FLUAD) - Quadrivalent - Adjuvanted - PF *Preferred* (65+) 10/17/2023    Influenza - High Dose - PF (65 years and older) 2018    Influenza - Quadrivalent 10/17/2023    PPD Test 2023    Pneumococcal Conjugate - 13 Valent 2019    Pneumococcal Polysaccharide - 23 Valent 2018    Zoster Recombinant 2018       Family History       Problem Relation (Age of Onset)    Cancer Paternal Grandmother, Maternal Grandmother, Maternal Grandfather    Heart  disease Mother          Social History     Socioeconomic History    Marital status: Single   Tobacco Use    Smoking status: Former     Passive exposure: Past    Smokeless tobacco: Never   Substance and Sexual Activity    Alcohol use: No    Drug use: No    Sexual activity: Yes     Partners: Female     Social Determinants of Health     Financial Resource Strain: Low Risk  (8/29/2024)    Overall Financial Resource Strain (CARDIA)     Difficulty of Paying Living Expenses: Not hard at all   Food Insecurity: No Food Insecurity (8/29/2024)    Hunger Vital Sign     Worried About Running Out of Food in the Last Year: Never true     Ran Out of Food in the Last Year: Never true   Transportation Needs: No Transportation Needs (8/29/2024)    TRANSPORTATION NEEDS     Transportation : No   Physical Activity: Inactive (8/29/2024)    Exercise Vital Sign     Days of Exercise per Week: 0 days     Minutes of Exercise per Session: 0 min   Stress: Stress Concern Present (8/29/2024)    [employer] - Occupational Stress Questionnaire     Feeling of Stress : To some extent   Housing Stability: Low Risk  (8/29/2024)    Housing Stability Vital Sign     Unable to Pay for Housing in the Last Year: No     Homeless in the Last Year: No     Review of Systems   Constitutional:  Negative for chills and fever.   Respiratory:  Negative for cough and shortness of breath.    Cardiovascular:  Negative for chest pain.   Gastrointestinal:  Positive for abdominal pain. Negative for constipation, diarrhea, nausea and vomiting.   Musculoskeletal:  Negative for arthralgias and myalgias.   Skin:  Negative for rash.   Neurological:  Negative for weakness and headaches.     Objective:     Vital Signs (Most Recent):  Temp: 98.5 °F (36.9 °C) (09/01/24 1139)  Pulse: (!) 54 (09/01/24 1139)  Resp: 12 (09/01/24 1139)  BP: (!) 154/82 (09/01/24 1139)  SpO2: (!) 91 % (09/01/24 1139) Vital Signs (24h Range):  Temp:  [97.9 °F (36.6 °C)-98.5 °F  (36.9 °C)] 98.5 °F (36.9 °C)  Pulse:  [] 54  Resp:  [12-19] 12  SpO2:  [91 %-100 %] 91 %  BP: (131-154)/(65-95) 154/82     Weight: 68 kg (149 lb 14.6 oz)  Body mass index is 22.14 kg/m².    Estimated Creatinine Clearance: 18.3 mL/min (A) (based on SCr of 3 mg/dL (H)).     Physical Exam  Vitals reviewed.   Constitutional:       General: He is not in acute distress.     Appearance: Normal appearance. He is not ill-appearing.   HENT:      Head: Normocephalic and atraumatic.   Eyes:      Extraocular Movements: Extraocular movements intact.      Conjunctiva/sclera: Conjunctivae normal.   Cardiovascular:      Rate and Rhythm: Normal rate and regular rhythm.      Heart sounds: No murmur heard.  Pulmonary:      Effort: Pulmonary effort is normal. No respiratory distress.      Breath sounds: Normal breath sounds.   Abdominal:      General: Abdomen is flat. Bowel sounds are normal.      Palpations: Abdomen is soft.      Comments: Incision sites appear to be well healing   Musculoskeletal:      Cervical back: Normal range of motion.   Skin:     General: Skin is warm and dry.   Neurological:      General: No focal deficit present.      Mental Status: He is alert and oriented to person, place, and time.   Psychiatric:         Mood and Affect: Mood normal.         Behavior: Behavior normal.         Thought Content: Thought content normal.          Significant Labs: All pertinent labs within the past 24 hours have been reviewed.  Recent Lab Results  (Last 5 results in the past 24 hours)        09/01/24  1141   09/01/24  0800   09/01/24  0528   09/01/24  0420   08/31/24  2303        Albumin       2.1         ALP       143         ALT       95         Anion Gap       7         AST       26         Baso #       0.02         Basophil %       0.3         BILIRUBIN TOTAL       1.2  Comment: For infants and newborns, interpretation of results should be based  on gestational age, weight and in agreement with  clinical  observations.    Premature Infant recommended reference ranges:  Up to 24 hours.............<8.0 mg/dL  Up to 48 hours............<12.0 mg/dL  3-5 days..................<15.0 mg/dL  6-29 days.................<15.0 mg/dL           BUN       57         Calcium       8.2         Chloride       115         CO2       18         Creatinine       3.0         Differential Method       Automated         eGFR       20.1         Eos #       0.2         Eos %       2.9         Glucose       84         Gran # (ANC)       6.0         Gran %       74.7         Hematocrit       37.3         Hemoglobin       12.0         Immature Grans (Abs)       0.08  Comment: Mild elevation in immature granulocytes is non specific and   can be seen in a variety of conditions including stress response,   acute inflammation, trauma and pregnancy. Correlation with other   laboratory and clinical findings is essential.           Immature Granulocytes       1.0         Lymph #       0.7         Lymph %       9.1         Magnesium        2.5         MCH       30.9         MCHC       32.2         MCV       96         Mono #       1.0         Mono %       12.0         MPV       12.6         nRBC       0         QRS Duration               OHS QTC Calculation               Phosphorus Level       2.3         Platelet Count       144         POCT Glucose 106   85   94     95       Potassium       4.3         PROTEIN TOTAL       5.3         RBC       3.88         RDW       15.1         Sodium       140         WBC       7.99                                Significant Imaging: I have reviewed all pertinent imaging results/findings within the past 24 hours.

## 2024-09-01 NOTE — PLAN OF CARE
Problem: Adult Inpatient Plan of Care  Goal: Plan of Care Review  Outcome: Progressing  Goal: Absence of Hospital-Acquired Illness or Injury  Outcome: Progressing  Goal: Optimal Comfort and Wellbeing  Outcome: Progressing  Goal: Readiness for Transition of Care  Outcome: Progressing     Problem: Fall Injury Risk  Goal: Absence of Fall and Fall-Related Injury  Outcome: Progressing     Problem: Skin Injury Risk Increased  Goal: Skin Health and Integrity  Outcome: Progressing

## 2024-09-01 NOTE — CONSULTS
"[facility] - Telemetry Stepdown  Infectious Disease  Consult Note    Patient Name: Josh Pinot  MRN: [id number]  Admission Date: 8/28/2024  Hospital Length of Stay: 4 days  Attending Physician: David Yi MD  Primary Care Provider: Hali Burrell MD     Isolation Status: No active isolations    Patient information was obtained from patient and ER records.      Inpatient consult to Infectious Diseases  Consult performed by: Riddhi Pizarro DO  Consult ordered by: David Yi MD        Assessment/Plan:     ID  Bacteremia  84M with PMH of HTHN, CKD4, BPH, gout, RCC s/p L nephrectomy c/b hernia requiring exlap in 2023, admitted with cholangitis, now s/p ERCP and lap raghu on 8/30, found to be bacteremic also. 8/28 BCX with pseudomonas on BCID, but only kleb oxytoca on actual growth (resistant to FQs, ampicillin, tetracycline, bactrim, intermediate to imipenem). Patient is currently on zosyn (since 8/28). Cleared as of 8/29.     Recommendations  Continue zosyn, to cover for possible pseudomonas given positive on BCID  Durations 2 weeks        Thank you for your consult. I will sign off. Please contact us if you have any additional questions.    Riddhi Pizarro DO  Infectious Disease  [facility]    Subjective:     Principal Problem: Cholangitis    HPI: Mr. Pinto is a 84M with PMH of HTHN, CKD4, BPH, gout, RCC s/p L nephrectomy c/b hernia requiring exlap in 2023, admitted with cholangitis, now s/p ERCP and lap raghu on 8/30, found to be bacteremic also. Infectious disease consulted for "Klebsiella bacteremia/s/p cholycystectomy/duration of abx".     Patient underwent ERCP on 8/30 with biliary sphincterotomy, balloon extraction, plastic stent placed. Underwent laparoscopic cholecystectomy on the same day. 8/28 BCX with pseudomonas on BCID, but only kleb oxytoca on actual growth (resistant to FQs, ampicillin, tetracycline, bactrim, intermediate to imipenem). Patient is currently on zosyn " (since 8/28). Repeat BCX from 8/29 NGTD. Is afebrile, on room air / 2L, hemodynamically stable, no leukocytosis.       Past Medical History:   Diagnosis Date    Allergy     Arthritis     Cataract     Hypertension        Past Surgical History:   Procedure Laterality Date    BLADDER FULGURATION N/A 8/19/2023    Procedure: FULGURATION, BLADDER;  Surgeon: Abdulaziz Garcia MD;  Location: [facility] OR;  Service: Urology;  Laterality: N/A;    CYSTOSCOPY N/A 8/19/2023    Procedure: CYSTOSCOPY;  Surgeon: Abdulaziz Garcia MD;  Location: [facility] OR;  Service: Urology;  Laterality: N/A;    ERCP N/A 8/29/2024    Procedure: ERCP (ENDOSCOPIC RETROGRADE CHOLANGIOPANCREATOGRAPHY);  Surgeon: Truman Vargas MD;  Location: [facility]O [facility]  Service: Endoscopy;  Laterality: N/A;    ESOPHAGOGASTRODUODENOSCOPY N/A 9/20/2023    Procedure: EGD (ESOPHAGOGASTRODUODENOSCOPY);  Surgeon: Varinder Melo MD;  Location: [facility] ENDO;  Service: Gastroenterology;  Laterality: N/A;    HERNIA REPAIR N/A 9/3/2023    Procedure: REPAIR, HERNIA INTERNAL;  Surgeon: Andrew Juares MD;  Location: [facility] OR;  Service: General;  Laterality: N/A;    INSERTION OF CATHETER N/A 8/19/2023    Procedure: INSERTION, CATHETER;  Surgeon: Abdulaziz Garcia MD;  Location: [facility] OR;  Service: Urology;  Laterality: N/A;    knee surgery      right knee    LAPAROSCOPIC ROBOT-ASSISTED SURGICAL REMOVAL OF KIDNEY USING DA EVIN XI Left 8/20/2023    Procedure: XI ROBOTIC NEPHRECTOMY;  Surgeon: Abdulaziz Garcia MD;  Location: [facility] OR;  Service: Urology;  Laterality: Left;    LAPAROTOMY, EXPLORATORY N/A 9/3/2023    Procedure: LAPAROTOMY, EXPLORATORY;  Surgeon: Andrew Juares MD;  Location: [facility] OR;  Service: General;  Laterality: N/A;       Review of patient's allergies indicates:   Allergen Reactions    Sulfa (sulfonamide antibiotics) Rash       Medications:  Medications Prior to Admission   Medication Sig    allopurinoL (ZYLOPRIM) 100 MG tablet Take a half a tablet  every other day due to your chronic kidney disease    cholecalciferol, vitamin D3, (VITAMIN D3) 125 mcg (5,000 unit) Tab Take 5,000 Units by mouth once daily.    colchicine (COLCRYS) 0.6 mg tablet 1.2 mg at the first sign of  gout flare, followed in 1 hour with a single dose of 0.6 mg. repeat treatment should not occur for at least 14 days. Use with caution due to chronic kidney disease    finasteride (PROSCAR) 5 mg tablet Take 1 tablet (5 mg total) by mouth once daily.    fludrocortisone (FLORINEF) 0.1 mg Tab Take 100 mcg by mouth every morning.    iron-vitamin C 100-250 mg, ICAR-C, 100-250 mg Tab Take 1 tablet by mouth every morning.    magnesium oxide (MAG-OX) 250 mg magnesium Tab SMARTSI caplet By Mouth Every Morning    metoprolol tartrate (LOPRESSOR) 25 MG tablet Take 12.5 mg by mouth 2 (two) times daily.    pantoprazole (PROTONIX) 40 MG tablet Take 1 tablet (40 mg total) by mouth 2 (two) times daily before meals. (Patient taking differently: Take 40 mg by mouth once daily.)    potassium chloride SA (K-DUR,KLOR-CON) 20 MEQ tablet Take 20 mEq by mouth every morning.    tamsulosin (FLOMAX) 0.4 mg Cap Take 1 capsule (0.4 mg total) by mouth once daily.     Antibiotics (From admission, onward)      Start     Stop Route Frequency Ordered    24 0100  piperacillin-tazobactam (ZOSYN) 4.5 g in D5W 100 mL IVPB (MB+)         -- IV Every 12 hours (non-standard times) 24 2354          Antifungals (From admission, onward)      None          Antivirals (From admission, onward)      None             Immunization History   Administered Date(s) Administered    Influenza (FLUAD) - Quadrivalent - Adjuvanted - PF *Preferred* (65+) 10/17/2023    Influenza - High Dose - PF (65 years and older) 2018    Influenza - Quadrivalent 10/17/2023    PPD Test 2023    Pneumococcal Conjugate - 13 Valent 2019    Pneumococcal Polysaccharide - 23 Valent 2018    Zoster Recombinant 2018       Family History        Problem Relation (Age of Onset)    Cancer Paternal Grandmother, Maternal Grandmother, Maternal Grandfather    Heart disease Mother          Social History     Socioeconomic History    Marital status: Single   Tobacco Use    Smoking status: Former     Passive exposure: Past    Smokeless tobacco: Never   Substance and Sexual Activity    Alcohol use: No    Drug use: No    Sexual activity: Yes     Partners: Female     Social Determinants of Health     Financial Resource Strain: Low Risk  (8/29/2024)    Overall Financial Resource Strain (CARDIA)     Difficulty of Paying Living Expenses: Not hard at all   Food Insecurity: No Food Insecurity (8/29/2024)    Hunger Vital Sign     Worried About Running Out of Food in the Last Year: Never true     Ran Out of Food in the Last Year: Never true   Transportation Needs: No Transportation Needs (8/29/2024)    TRANSPORTATION NEEDS     Transportation : No   Physical Activity: Inactive (8/29/2024)    Exercise Vital Sign     Days of Exercise per Week: 0 days     Minutes of Exercise per Session: 0 min   Stress: Stress Concern Present (8/29/2024)    [employer] [place] [employer] - Occupational Stress Questionnaire     Feeling of Stress : To some extent   Housing Stability: Low Risk  (8/29/2024)    Housing Stability Vital Sign     Unable to Pay for Housing in the Last Year: No     Homeless in the Last Year: No     Review of Systems   Constitutional:  Negative for chills and fever.   Respiratory:  Negative for cough and shortness of breath.    Cardiovascular:  Negative for chest pain.   Gastrointestinal:  Positive for abdominal pain. Negative for constipation, diarrhea, nausea and vomiting.   Musculoskeletal:  Negative for arthralgias and myalgias.   Skin:  Negative for rash.   Neurological:  Negative for weakness and headaches.     Objective:     Vital Signs (Most Recent):  Temp: 98.5 °F (36.9 °C) (09/01/24 1139)  Pulse: (!) 54 (09/01/24 1139)  Resp: 12 (09/01/24 1139)  BP:  (!) 154/82 (09/01/24 1139)  SpO2: (!) 91 % (09/01/24 1139) Vital Signs (24h Range):  Temp:  [97.9 °F (36.6 °C)-98.5 °F (36.9 °C)] 98.5 °F (36.9 °C)  Pulse:  [] 54  Resp:  [12-19] 12  SpO2:  [91 %-100 %] 91 %  BP: (131-154)/(65-95) 154/82     Weight: 68 kg (149 lb 14.6 oz)  Body mass index is 22.14 kg/m².    Estimated Creatinine Clearance: 18.3 mL/min (A) (based on SCr of 3 mg/dL (H)).     Physical Exam  Vitals reviewed.   Constitutional:       General: He is not in acute distress.     Appearance: Normal appearance. He is not ill-appearing.   HENT:      Head: Normocephalic and atraumatic.   Eyes:      Extraocular Movements: Extraocular movements intact.      Conjunctiva/sclera: Conjunctivae normal.   Cardiovascular:      Rate and Rhythm: Normal rate and regular rhythm.      Heart sounds: No murmur heard.  Pulmonary:      Effort: Pulmonary effort is normal. No respiratory distress.      Breath sounds: Normal breath sounds.   Abdominal:      General: Abdomen is flat. Bowel sounds are normal.      Palpations: Abdomen is soft.      Comments: Incision sites appear to be well healing   Musculoskeletal:      Cervical back: Normal range of motion.   Skin:     General: Skin is warm and dry.   Neurological:      General: No focal deficit present.      Mental Status: He is alert and oriented to person, place, and time.   Psychiatric:         Mood and Affect: Mood normal.         Behavior: Behavior normal.         Thought Content: Thought content normal.          Significant Labs: All pertinent labs within the past 24 hours have been reviewed.  Recent Lab Results  (Last 5 results in the past 24 hours)        09/01/24  1141   09/01/24  0800   09/01/24  0528   09/01/24  0420   08/31/24  2303        Albumin       2.1         ALP       143         ALT       95         Anion Gap       7         AST       26         Baso #       0.02         Basophil %       0.3         BILIRUBIN TOTAL       1.2  Comment: For infants and  newborns, interpretation of results should be based  on gestational age, weight and in agreement with clinical  observations.    Premature Infant recommended reference ranges:  Up to 24 hours.............<8.0 mg/dL  Up to 48 hours............<12.0 mg/dL  3-5 days..................<15.0 mg/dL  6-29 days.................<15.0 mg/dL           BUN       57         Calcium       8.2         Chloride       115         CO2       18         Creatinine       3.0         Differential Method       Automated         eGFR       20.1         Eos #       0.2         Eos %       2.9         Glucose       84         Gran # (ANC)       6.0         Gran %       74.7         Hematocrit       37.3         Hemoglobin       12.0         Immature Grans (Abs)       0.08  Comment: Mild elevation in immature granulocytes is non specific and   can be seen in a variety of conditions including stress response,   acute inflammation, trauma and pregnancy. Correlation with other   laboratory and clinical findings is essential.           Immature Granulocytes       1.0         Lymph #       0.7         Lymph %       9.1         Magnesium        2.5         MCH       30.9         MCHC       32.2         MCV       96         Mono #       1.0         Mono %       12.0         MPV       12.6         nRBC       0         QRS Duration               OHS QTC Calculation               Phosphorus Level       2.3         Platelet Count       144         POCT Glucose 106   85   94     95       Potassium       4.3         PROTEIN TOTAL       5.3         RBC       3.88         RDW       15.1         Sodium       140         WBC       7.99                                Significant Imaging: I have reviewed all pertinent imaging results/findings within the past 24 hours.

## 2024-09-01 NOTE — ASSESSMENT & PLAN NOTE
84M with PMH of HTHN, CKD4, BPH, gout, RCC s/p L nephrectomy c/b hernia requiring exlap in 2023, admitted with cholangitis, now s/p ERCP and lap raghu on 8/30, found to be bacteremic also. 8/28 BCX with pseudomonas on BCID, but only kleb oxytoca on actual growth (resistant to FQs, ampicillin, tetracycline, bactrim, intermediate to imipenem). Patient is currently on zosyn (since 8/28). Cleared as of 8/29.     Recommendations  Continue zosyn, to cover for possible pseudomonas given positive on BCID  Durations 2 weeks

## 2024-09-02 LAB
ALBUMIN SERPL BCP-MCNC: 2.1 G/DL (ref 3.5–5.2)
ALP SERPL-CCNC: 146 U/L (ref 55–135)
ALT SERPL W/O P-5'-P-CCNC: 70 U/L (ref 10–44)
ANION GAP SERPL CALC-SCNC: 6 MMOL/L (ref 8–16)
AST SERPL-CCNC: 22 U/L (ref 10–40)
BASOPHILS # BLD AUTO: 0.01 K/UL (ref 0–0.2)
BASOPHILS NFR BLD: 0.1 % (ref 0–1.9)
BILIRUB SERPL-MCNC: 1.1 MG/DL (ref 0.1–1)
BUN SERPL-MCNC: 49 MG/DL (ref 8–23)
CALCIUM SERPL-MCNC: 8.3 MG/DL (ref 8.7–10.5)
CHLORIDE SERPL-SCNC: 114 MMOL/L (ref 95–110)
CO2 SERPL-SCNC: 20 MMOL/L (ref 23–29)
CREAT SERPL-MCNC: 2.6 MG/DL (ref 0.5–1.4)
DIFFERENTIAL METHOD BLD: ABNORMAL
EOSINOPHIL # BLD AUTO: 0.4 K/UL (ref 0–0.5)
EOSINOPHIL NFR BLD: 4.5 % (ref 0–8)
ERYTHROCYTE [DISTWIDTH] IN BLOOD BY AUTOMATED COUNT: 14.6 % (ref 11.5–14.5)
EST. GFR  (NO RACE VARIABLE): 23.9 ML/MIN/1.73 M^2
GLUCOSE SERPL-MCNC: 82 MG/DL (ref 70–110)
HCT VFR BLD AUTO: 35.6 % (ref 40–54)
HGB BLD-MCNC: 11.9 G/DL (ref 14–18)
IMM GRANULOCYTES # BLD AUTO: 0.06 K/UL (ref 0–0.04)
IMM GRANULOCYTES NFR BLD AUTO: 0.7 % (ref 0–0.5)
LYMPHOCYTES # BLD AUTO: 0.8 K/UL (ref 1–4.8)
LYMPHOCYTES NFR BLD: 9.4 % (ref 18–48)
MAGNESIUM SERPL-MCNC: 2.1 MG/DL (ref 1.6–2.6)
MCH RBC QN AUTO: 31.5 PG (ref 27–31)
MCHC RBC AUTO-ENTMCNC: 33.4 G/DL (ref 32–36)
MCV RBC AUTO: 94 FL (ref 82–98)
MONOCYTES # BLD AUTO: 1.1 K/UL (ref 0.3–1)
MONOCYTES NFR BLD: 12.6 % (ref 4–15)
NEUTROPHILS # BLD AUTO: 6.1 K/UL (ref 1.8–7.7)
NEUTROPHILS NFR BLD: 72.7 % (ref 38–73)
NRBC BLD-RTO: 0 /100 WBC
PHOSPHATE SERPL-MCNC: 2 MG/DL (ref 2.7–4.5)
PLATELET # BLD AUTO: 143 K/UL (ref 150–450)
PMV BLD AUTO: 12.4 FL (ref 9.2–12.9)
POCT GLUCOSE: 113 MG/DL (ref 70–110)
POCT GLUCOSE: 132 MG/DL (ref 70–110)
POCT GLUCOSE: 150 MG/DL (ref 70–110)
POCT GLUCOSE: 81 MG/DL (ref 70–110)
POCT GLUCOSE: 98 MG/DL (ref 70–110)
POTASSIUM SERPL-SCNC: 4.1 MMOL/L (ref 3.5–5.1)
PROT SERPL-MCNC: 5.3 G/DL (ref 6–8.4)
RBC # BLD AUTO: 3.78 M/UL (ref 4.6–6.2)
SODIUM SERPL-SCNC: 140 MMOL/L (ref 136–145)
WBC # BLD AUTO: 8.44 K/UL (ref 3.9–12.7)

## 2024-09-02 PROCEDURE — 25000003 PHARM REV CODE 250: Performed by: NURSE PRACTITIONER

## 2024-09-02 PROCEDURE — 27000207 HC ISOLATION

## 2024-09-02 PROCEDURE — 63600175 PHARM REV CODE 636 W HCPCS: Performed by: INTERNAL MEDICINE

## 2024-09-02 PROCEDURE — 80053 COMPREHEN METABOLIC PANEL: CPT

## 2024-09-02 PROCEDURE — 21400001 HC TELEMETRY ROOM

## 2024-09-02 PROCEDURE — 25000003 PHARM REV CODE 250: Performed by: INTERNAL MEDICINE

## 2024-09-02 PROCEDURE — 36415 COLL VENOUS BLD VENIPUNCTURE: CPT

## 2024-09-02 PROCEDURE — 25000003 PHARM REV CODE 250: Performed by: HOSPITALIST

## 2024-09-02 PROCEDURE — 83735 ASSAY OF MAGNESIUM: CPT

## 2024-09-02 PROCEDURE — 63600175 PHARM REV CODE 636 W HCPCS

## 2024-09-02 PROCEDURE — 20600001 HC STEP DOWN PRIVATE ROOM

## 2024-09-02 PROCEDURE — 84100 ASSAY OF PHOSPHORUS: CPT

## 2024-09-02 PROCEDURE — 85025 COMPLETE CBC W/AUTO DIFF WBC: CPT

## 2024-09-02 RX ADMIN — HEPARIN SODIUM 5000 UNITS: 5000 INJECTION INTRAVENOUS; SUBCUTANEOUS at 09:09

## 2024-09-02 RX ADMIN — FINASTERIDE 5 MG: 5 TABLET, FILM COATED ORAL at 08:09

## 2024-09-02 RX ADMIN — Medication 1 CAPSULE: at 08:09

## 2024-09-02 RX ADMIN — FLUDROCORTISONE ACETATE 100 MCG: 0.1 TABLET ORAL at 06:09

## 2024-09-02 RX ADMIN — PIPERACILLIN SODIUM AND TAZOBACTAM SODIUM 4.5 G: 4; .5 INJECTION, POWDER, FOR SOLUTION INTRAVENOUS at 04:09

## 2024-09-02 RX ADMIN — ALLOPURINOL 50 MG: 300 TABLET ORAL at 08:09

## 2024-09-02 RX ADMIN — HEPARIN SODIUM 5000 UNITS: 5000 INJECTION INTRAVENOUS; SUBCUTANEOUS at 04:09

## 2024-09-02 RX ADMIN — HEPARIN SODIUM 5000 UNITS: 5000 INJECTION INTRAVENOUS; SUBCUTANEOUS at 06:09

## 2024-09-02 RX ADMIN — TAMSULOSIN HYDROCHLORIDE 0.4 MG: 0.4 CAPSULE ORAL at 08:09

## 2024-09-02 RX ADMIN — PIPERACILLIN SODIUM AND TAZOBACTAM SODIUM 4.5 G: 4; .5 INJECTION, POWDER, FOR SOLUTION INTRAVENOUS at 03:09

## 2024-09-02 NOTE — PLAN OF CARE
Problem: Adult Inpatient Plan of Care  Goal: Plan of Care Review  Outcome: Progressing  Goal: Absence of Hospital-Acquired Illness or Injury  Outcome: Progressing  Goal: Optimal Comfort and Wellbeing  Outcome: Progressing  Goal: Readiness for Transition of Care  Outcome: Progressing     Problem: Fall Injury Risk  Goal: Absence of Fall and Fall-Related Injury  Outcome: Progressing     Problem: Skin Injury Risk Increased  Goal: Skin Health and Integrity  Outcome: Progressing     Problem: Wound  Goal: Skin Health and Integrity  Outcome: Progressing  Goal: Optimal Wound Healing  Outcome: Progressing

## 2024-09-02 NOTE — PLAN OF CARE
Problem: Adult Inpatient Plan of Care  Goal: Plan of Care Review  Outcome: Progressing  Goal: Patient-Specific Goal (Individualized)  Outcome: Progressing  Goal: Absence of Hospital-Acquired Illness or Injury  Outcome: Progressing  Goal: Optimal Comfort and Wellbeing  Outcome: Progressing  Goal: Readiness for Transition of Care  Outcome: Progressing     Problem: Fall Injury Risk  Goal: Absence of Fall and Fall-Related Injury  Outcome: Progressing     Problem: Skin Injury Risk Increased  Goal: Skin Health and Integrity  Outcome: Progressing     Problem: Wound  Goal: Optimal Coping  Outcome: Progressing  Goal: Optimal Functional Ability  Outcome: Progressing  Goal: Absence of Infection Signs and Symptoms  Outcome: Progressing  Goal: Improved Oral Intake  Outcome: Progressing  Goal: Optimal Pain Control and Function  Outcome: Progressing  Goal: Skin Health and Integrity  Outcome: Progressing  Goal: Optimal Wound Healing  Outcome: Progressing     Problem: Infection  Goal: Absence of Infection Signs and Symptoms  Outcome: Progressing

## 2024-09-03 LAB
ALBUMIN SERPL BCP-MCNC: 2.1 G/DL (ref 3.5–5.2)
ALP SERPL-CCNC: 151 U/L (ref 55–135)
ALT SERPL W/O P-5'-P-CCNC: 55 U/L (ref 10–44)
ANION GAP SERPL CALC-SCNC: 7 MMOL/L (ref 8–16)
AST SERPL-CCNC: 21 U/L (ref 10–40)
BACTERIA BLD CULT: ABNORMAL
BACTERIA BLD CULT: NORMAL
BACTERIA BLD CULT: NORMAL
BASOPHILS # BLD AUTO: 0.02 K/UL (ref 0–0.2)
BASOPHILS NFR BLD: 0.2 % (ref 0–1.9)
BILIRUB SERPL-MCNC: 1 MG/DL (ref 0.1–1)
BUN SERPL-MCNC: 41 MG/DL (ref 8–23)
CALCIUM SERPL-MCNC: 8.4 MG/DL (ref 8.7–10.5)
CHLORIDE SERPL-SCNC: 110 MMOL/L (ref 95–110)
CO2 SERPL-SCNC: 22 MMOL/L (ref 23–29)
CREAT SERPL-MCNC: 2.7 MG/DL (ref 0.5–1.4)
DIFFERENTIAL METHOD BLD: ABNORMAL
EOSINOPHIL # BLD AUTO: 0.6 K/UL (ref 0–0.5)
EOSINOPHIL NFR BLD: 7 % (ref 0–8)
ERYTHROCYTE [DISTWIDTH] IN BLOOD BY AUTOMATED COUNT: 14.2 % (ref 11.5–14.5)
EST. GFR  (NO RACE VARIABLE): 22.8 ML/MIN/1.73 M^2
FINAL PATHOLOGIC DIAGNOSIS: NORMAL
GLUCOSE SERPL-MCNC: 92 MG/DL (ref 70–110)
GROSS: NORMAL
HCT VFR BLD AUTO: 36.6 % (ref 40–54)
HGB BLD-MCNC: 12.3 G/DL (ref 14–18)
IMM GRANULOCYTES # BLD AUTO: 0.15 K/UL (ref 0–0.04)
IMM GRANULOCYTES NFR BLD AUTO: 1.8 % (ref 0–0.5)
LYMPHOCYTES # BLD AUTO: 1 K/UL (ref 1–4.8)
LYMPHOCYTES NFR BLD: 11.8 % (ref 18–48)
Lab: NORMAL
MAGNESIUM SERPL-MCNC: 2 MG/DL (ref 1.6–2.6)
MCH RBC QN AUTO: 31.2 PG (ref 27–31)
MCHC RBC AUTO-ENTMCNC: 33.6 G/DL (ref 32–36)
MCV RBC AUTO: 93 FL (ref 82–98)
MONOCYTES # BLD AUTO: 1.1 K/UL (ref 0.3–1)
MONOCYTES NFR BLD: 13.5 % (ref 4–15)
NEUTROPHILS # BLD AUTO: 5.5 K/UL (ref 1.8–7.7)
NEUTROPHILS NFR BLD: 65.7 % (ref 38–73)
NRBC BLD-RTO: 0 /100 WBC
PHOSPHATE SERPL-MCNC: 2.3 MG/DL (ref 2.7–4.5)
PLATELET # BLD AUTO: 166 K/UL (ref 150–450)
PMV BLD AUTO: 12.3 FL (ref 9.2–12.9)
POCT GLUCOSE: 102 MG/DL (ref 70–110)
POCT GLUCOSE: 105 MG/DL (ref 70–110)
POCT GLUCOSE: 111 MG/DL (ref 70–110)
POCT GLUCOSE: 115 MG/DL (ref 70–110)
POCT GLUCOSE: 115 MG/DL (ref 70–110)
POCT GLUCOSE: 96 MG/DL (ref 70–110)
POTASSIUM SERPL-SCNC: 4 MMOL/L (ref 3.5–5.1)
PROT SERPL-MCNC: 5.5 G/DL (ref 6–8.4)
RBC # BLD AUTO: 3.94 M/UL (ref 4.6–6.2)
SODIUM SERPL-SCNC: 139 MMOL/L (ref 136–145)
WBC # BLD AUTO: 8.4 K/UL (ref 3.9–12.7)

## 2024-09-03 PROCEDURE — 83735 ASSAY OF MAGNESIUM: CPT

## 2024-09-03 PROCEDURE — 25000003 PHARM REV CODE 250: Performed by: INTERNAL MEDICINE

## 2024-09-03 PROCEDURE — 21400001 HC TELEMETRY ROOM

## 2024-09-03 PROCEDURE — 36410 VNPNXR 3YR/> PHY/QHP DX/THER: CPT

## 2024-09-03 PROCEDURE — 63600175 PHARM REV CODE 636 W HCPCS

## 2024-09-03 PROCEDURE — 63600175 PHARM REV CODE 636 W HCPCS: Performed by: INTERNAL MEDICINE

## 2024-09-03 PROCEDURE — 36415 COLL VENOUS BLD VENIPUNCTURE: CPT

## 2024-09-03 PROCEDURE — A4216 STERILE WATER/SALINE, 10 ML: HCPCS | Performed by: INTERNAL MEDICINE

## 2024-09-03 PROCEDURE — 80053 COMPREHEN METABOLIC PANEL: CPT

## 2024-09-03 PROCEDURE — C1751 CATH, INF, PER/CENT/MIDLINE: HCPCS

## 2024-09-03 PROCEDURE — 76937 US GUIDE VASCULAR ACCESS: CPT

## 2024-09-03 PROCEDURE — 25000003 PHARM REV CODE 250: Performed by: NURSE PRACTITIONER

## 2024-09-03 PROCEDURE — 84100 ASSAY OF PHOSPHORUS: CPT

## 2024-09-03 PROCEDURE — 27000207 HC ISOLATION

## 2024-09-03 PROCEDURE — 25000003 PHARM REV CODE 250: Performed by: HOSPITALIST

## 2024-09-03 PROCEDURE — 85025 COMPLETE CBC W/AUTO DIFF WBC: CPT

## 2024-09-03 RX ORDER — PANTOPRAZOLE SODIUM 40 MG/1
40 TABLET, DELAYED RELEASE ORAL DAILY
Qty: 30 TABLET | Refills: 11 | Status: SHIPPED | OUTPATIENT
Start: 2024-09-03 | End: 2025-09-03

## 2024-09-03 RX ORDER — SODIUM CHLORIDE 0.9 % (FLUSH) 0.9 %
10 SYRINGE (ML) INJECTION EVERY 6 HOURS
Status: DISCONTINUED | OUTPATIENT
Start: 2024-09-03 | End: 2024-09-12 | Stop reason: HOSPADM

## 2024-09-03 RX ORDER — SODIUM CHLORIDE 0.9 % (FLUSH) 0.9 %
10 SYRINGE (ML) INJECTION
Status: DISCONTINUED | OUTPATIENT
Start: 2024-09-03 | End: 2024-09-12 | Stop reason: HOSPADM

## 2024-09-03 RX ADMIN — FLUDROCORTISONE ACETATE 100 MCG: 0.1 TABLET ORAL at 06:09

## 2024-09-03 RX ADMIN — HEPARIN SODIUM 5000 UNITS: 5000 INJECTION INTRAVENOUS; SUBCUTANEOUS at 09:09

## 2024-09-03 RX ADMIN — FINASTERIDE 5 MG: 5 TABLET, FILM COATED ORAL at 08:09

## 2024-09-03 RX ADMIN — PIPERACILLIN SODIUM AND TAZOBACTAM SODIUM 4.5 G: 4; .5 INJECTION, POWDER, FOR SOLUTION INTRAVENOUS at 02:09

## 2024-09-03 RX ADMIN — Medication 1 CAPSULE: at 08:09

## 2024-09-03 RX ADMIN — HEPARIN SODIUM 5000 UNITS: 5000 INJECTION INTRAVENOUS; SUBCUTANEOUS at 06:09

## 2024-09-03 RX ADMIN — PIPERACILLIN SODIUM AND TAZOBACTAM SODIUM 4.5 G: 4; .5 INJECTION, POWDER, FOR SOLUTION INTRAVENOUS at 10:09

## 2024-09-03 RX ADMIN — TAMSULOSIN HYDROCHLORIDE 0.4 MG: 0.4 CAPSULE ORAL at 08:09

## 2024-09-03 RX ADMIN — PIPERACILLIN SODIUM AND TAZOBACTAM SODIUM 4.5 G: 4; .5 INJECTION, POWDER, FOR SOLUTION INTRAVENOUS at 07:09

## 2024-09-03 RX ADMIN — HEPARIN SODIUM 5000 UNITS: 5000 INJECTION INTRAVENOUS; SUBCUTANEOUS at 02:09

## 2024-09-03 RX ADMIN — Medication 10 ML: at 06:09

## 2024-09-03 NOTE — PLAN OF CARE
Problem: Adult Inpatient Plan of Care  Goal: Plan of Care Review  Outcome: Progressing  Goal: Absence of Hospital-Acquired Illness or Injury  Outcome: Progressing  Goal: Optimal Comfort and Wellbeing  Outcome: Progressing  Goal: Readiness for Transition of Care  Outcome: Progressing     Problem: Fall Injury Risk  Goal: Absence of Fall and Fall-Related Injury  Outcome: Progressing     Problem: Skin Injury Risk Increased  Goal: Skin Health and Integrity  Outcome: Progressing     Problem: Infection  Goal: Absence of Infection Signs and Symptoms  Outcome: Progressing

## 2024-09-03 NOTE — PLAN OF CARE
[facility] - Telemetry Stepdown      HOME HEALTH ORDERS  FACE TO FACE ENCOUNTER    Patient Name: Josh Pinto  YOB: 1940    PCP: Hali Burrell MD   PCP Address: [address] [facility] [address]  PCP Phone Number: [phone]  PCP Fax: [phone]    Encounter Date: 8/28/24    Admit to Home Health    Diagnoses:  Active Hospital Problems    Diagnosis  POA    *Cholangitis [K83.09]  Yes    Bacteremia [R78.81]  Yes    Chronic gout of multiple sites [M1A.09X0]  Yes    Acute hypoxemic respiratory failure [J96.01]  Yes    Stage 4 chronic kidney disease [N18.4]  Yes    Renal cell carcinoma of left kidney, nephrectomy in 8/2023 [C64.2]  Yes    HTN (hypertension), dx 1985 [I10]  Yes    BPH with urinary obstruction [N40.1, N13.8]  Yes      Resolved Hospital Problems   No resolved problems to display.       Follow Up Appointments:  Future Appointments   Date Time Provider Department Center   2/19/2025 11:00 AM Maura Lowery Jr., MD [facility] UROLOGY [place]   6/5/2025 10:20 AM Johnathan Angel MD [facility] CARDIO [facility]       Allergies:  Review of patient's allergies indicates:   Allergen Reactions    Sulfa (sulfonamide antibiotics) Rash       Medications: Review discharge medications with patient and family and provide education.    Current Facility-Administered Medications   Medication Dose Route Frequency Provider Last Rate Last Admin    allopurinol split tablet 50 mg  50 mg Oral Every other day Desire Lyn NP   50 mg at 09/02/24 0827    dextrose 10% bolus 125 mL 125 mL  12.5 g Intravenous PRN Sedrick Ureña MD        dextrose 10% bolus 250 mL 250 mL  25 g Intravenous PRN Sedrick Ureña MD        finasteride tablet 5 mg  5 mg Oral Daily Scott Bee NP   5 mg at 09/03/24 0802    fludrocortisone tablet 100 mcg  100 mcg Oral QAM Scott Bee NP   100 mcg at 09/03/24 0603    glucagon (human recombinant) injection 1 mg  1 mg Intramuscular PRN Desire Lyn,  NP        heparin (porcine) injection 5,000 Units  5,000 Units Subcutaneous Q8H Helena Goodwin MD   5,000 Units at 24 0603    insulin aspart U-100 pen 0-10 Units  0-10 Units Subcutaneous Q6H PRN Desire Lyn NP        Lactobacillus rhamnosus GG capsule 1 capsule  1 capsule Oral Daily Darell Vieira MD   1 capsule at 24 0802    oxyCODONE immediate release tablet 5 mg  5 mg Oral Q6H PRN David Yi MD   5 mg at 24 0235    piperacillin-tazobactam (ZOSYN) 4.5 g in D5W 100 mL IVPB (MB+)  4.5 g Intravenous Q8H David Yi MD 25 mL/hr at 24 1059 4.5 g at 24 1059    sodium chloride 0.9% flush 10 mL  10 mL Intravenous PRN Helena Goodwin MD        tamsulosin 24 hr capsule 0.4 mg  0.4 mg Oral Daily Scott Bee NP   0.4 mg at 24 0802        Medication List        START taking these medications      D5W PgBk 100 mL with piperacillin-tazobactam 4.5 gram SolR 4.5 g  Inject 4.5 g into the vein every 12  hours. for 9 days            CONTINUE taking these medications      allopurinoL 100 MG tablet  Commonly known as: ZYLOPRIM  Take a half a tablet every other day due to your chronic kidney disease     colchicine 0.6 mg tablet  Commonly known as: COLCRYS  1.2 mg at the first sign of  gout flare, followed in 1 hour with a single dose of 0.6 mg. repeat treatment should not occur for at least 14 days. Use with caution due to chronic kidney disease     finasteride 5 mg tablet  Commonly known as: PROSCAR  Take 1 tablet (5 mg total) by mouth once daily.     fludrocortisone 0.1 mg Tab  Commonly known as: FLORINEF  Take 100 mcg by mouth every morning.     iron-vitamin C 100-250 mg (ICAR-C) 100-250 mg Tab  Take 1 tablet by mouth every morning.     magnesium oxide 250 mg magnesium Tab  Commonly known as: MAG-OX  SMARTSI caplet By Mouth Every Morning     metoprolol tartrate 25 MG tablet  Commonly known as: LOPRESSOR  Take 12.5 mg by mouth 2 (two) times daily.      pantoprazole 40 MG tablet  Commonly known as: PROTONIX  Take 1 tablet (40 mg total) by mouth once daily.     potassium chloride SA 20 MEQ tablet  Commonly known as: K-DUR,KLOR-CON  Take 20 mEq by mouth every morning.     tamsulosin 0.4 mg Cap  Commonly known as: FLOMAX  Take 1 capsule (0.4 mg total) by mouth once daily.     VITAMIN D3 125 mcg (5,000 unit) Tab  Generic drug: cholecalciferol (vitamin D3)  Take 5,000 Units by mouth once daily.                I have seen and examined this patient within the last 30 days. My clinical findings that support the need for the home health skilled services and home bound status are the following:no   Weakness/numbness causing balance and gait disturbance due to Infection making it taxing to leave home.     Diet:   regular diet            Nursing:   Agency to admit patient within 24 hours of hospital discharge unless specified on physician order or at patient request    SN to complete comprehensive assessment including routine vital signs. Instruct on disease process and s/s of complications to report to MD. Review/verify medication list sent home with the patient at time of discharge  and instruct patient/caregiver as needed. Frequency may be adjusted depending on start of care date.     Skilled nurse to perform up to 3 visits PRN for symptoms related to diagnosis    Notify MD if SBP > 160 or < 90; DBP > 90 or < 50; HR > 120 or < 50; Temp > 101; O2 < 88%; Other:     Ok to schedule additional visits based on staff availability and patient request on consecutive days within the home health episode.    When multiple disciplines ordered:    Start of Care occurs on Sunday - Wednesday schedule remaining discipline evaluations as ordered on separate consecutive days following the start of care.    Thursday SOC -schedule subsequent evaluations Friday and Monday the following week.     Friday - Saturday SOC - schedule subsequent discipline evaluations on consecutive days starting  Monday of the following week.    For all post-discharge communication and subsequent orders please contact patient's primary care physician. If unable to reach primary care physician or do not receive response within 30 minutes, please contact PCp for clinical staff order clarification    Miscellaneous   Home Infusion Therapy:   SN to perform Infusion Therapy/Central Line Care.  Review Central Line Care & Central Line Flush with patient.    Intravenous antibiotics:  Zosyn 4.5g IV q 12 hours     3) Therapy Duration:  2 weeks     Estimated end date of IV antibiotics: 09/12/24     4) Outpatient Weekly Labs:     Order the following labs to be drawn on Mondays:   CBC  CMP      5) Fax Lab Results to Infectious Diseases Provider: Dr. Pizarro     [facility] Fax Number: [phone]    Scrub the Hub: Prior to accessing the line, always perform a 30 second alcohol scrub  Each lumen of the central line is to be flushed at least daily with 10 mL Normal Saline and 3 mL Heparin flush (10 units/mL)  Skilled Nurse (SN) may draw blood from IV access  Blood Draw Procedure:   - Aspirate at least 5 mL of blood   - Discard   - Obtain specimen   - Change injection cap   - Flush with 20 mL Normal Saline followed by a                 3-5 mL Heparin flush (10 units/mL)  Central :   - Sterile dressing changes are done weekly and as needed.   - Use chlor-hexadine scrub to cleanse site, apply Biopatch to insertion site,       apply securement device dressing   - Injection caps are changed weekly and after EVERY lab draw.   - If sterile gauze is under dressing to control oozing,                 dressing change must be performed every 24 hours until gauze is not needed.        I certify that this patient is confined to his home and needs intermittent skilled nursing care.

## 2024-09-03 NOTE — PLAN OF CARE
09/03/24 1347   Post-Acute Status   Post-Acute Authorization IV Infusion;Home Health   Home Health Status Referrals Sent   IV Infusion Status Referral(s) sent   Discharge Delays None known at this time   Discharge Plan   Discharge Plan A Home;Home Health  (HI)   Discharge Plan B Home;Home Health  (HI)     Referral was sent to [facility] for Home IV abx and [facility] for HH. Awaiting final acceptance, training and auth. Discharge Plan A and Plan B have been determined by review of patient's clinical status, future medical and therapeutic needs, and coverage/benefits for post-acute care in coordination with multidisciplinary team members.  Edward Lee, [facility]    [facility]  [phone]

## 2024-09-03 NOTE — PROGRESS NOTES
Pharmacist Renal Dose Adjustment Note    Josh Pinto is a 84 y.o. male being treated with the medication piperacillin-tazobactam     Patient Data:    Vital Signs (Most Recent):  Temp: 98.4 °F (36.9 °C) (09/03/24 0720)  Pulse: (!) 59 (09/03/24 0720)  Resp: 18 (09/03/24 0720)  BP: 136/74 (09/03/24 0720)  SpO2: 95 % (09/03/24 0900) Vital Signs (72h Range):  Temp:  [97.5 °F (36.4 °C)-99 °F (37.2 °C)]   Pulse:  []   Resp:  [12-20]   BP: (126-170)/(65-95)   SpO2:  [90 %-100 %]      Recent Labs   Lab 09/01/24  0420 09/02/24  0234 09/03/24  0538   CREATININE 3.0* 2.6* 2.7*     Serum creatinine: 2.7 mg/dL (H) 09/03/24 0538  Estimated creatinine clearance: 20.3 mL/min (A)    Piperacillin-tazobactam 4.5g IV q12h will be changed to q8h     Pharmacist's Name: Bina Dumont  Pharmacist's Extension: [phone]

## 2024-09-03 NOTE — NURSING
Dr Hernandez notified that pt is jolene down to as low as 45 when he is sleeping. Pt asymptomatic. No new order received. Cont to monitor

## 2024-09-03 NOTE — PROGRESS NOTES
[facility] - Telemetry [facility] Medicine  Progress Note    Patient Name: Josh Pinto  MRN: [id number]  Patient Class: IP- Inpatient   Admission Date: 8/28/2024  Length of Stay: 6 days  Attending Physician: David Yi MD  Primary Care Provider: Hali Burrell MD        Subjective:     Principal Problem:Cholangitis        HPI:  No notes on file    Overview/Hospital Course:  No notes on file    Interval History: Ready fro dc, SNF, OPAT recs are in.    Review of Systems  Objective:     Vital Signs (Most Recent):  Temp: 98.4 °F (36.9 °C) (09/03/24 0720)  Pulse: (!) 59 (09/03/24 0720)  Resp: 18 (09/03/24 0720)  BP: 136/74 (09/03/24 0720)  SpO2: 95 % (09/03/24 0900) Vital Signs (24h Range):  Temp:  [97.5 °F (36.4 °C)-98.8 °F (37.1 °C)] 98.4 °F (36.9 °C)  Pulse:  [46-92] 59  Resp:  [17-20] 18  SpO2:  [92 %-96 %] 95 %  BP: (136-170)/(71-82) 136/74     Weight: 68 kg (149 lb 14.6 oz)  Body mass index is 22.14 kg/m².    Intake/Output Summary (Last 24 hours) at 9/3/2024 0948  Last data filed at 9/3/2024 0558  Gross per 24 hour   Intake 677.02 ml   Output 900 ml   Net -222.98 ml         Physical Exam  Constitutional:       General: He is not in acute distress.     Appearance: Normal appearance.   HENT:      Head: Normocephalic and atraumatic.   Cardiovascular:      Rate and Rhythm: Normal rate and regular rhythm.   Pulmonary:      Effort: Pulmonary effort is normal. No respiratory distress.   Abdominal:      General: Abdomen is flat. There is no distension.      Palpations: Abdomen is soft.      Tenderness: There is no abdominal tenderness.      Comments: Well healed midline incision  Well healed robotic incisions   Neurological:      General: No focal deficit present.      Mental Status: He is alert and oriented to person, place, and time.   Psychiatric:         Behavior: Behavior normal.         Thought Content: Thought content normal.             Significant Labs: All pertinent labs within the past 24 hours  have been reviewed.  BMP:   Recent Labs   Lab 09/03/24  0538   GLU 92      K 4.0      CO2 22*   BUN 41*   CREATININE 2.7*   CALCIUM 8.4*   MG 2.0       Significant Imaging: I have reviewed all pertinent imaging results/findings within the past 24 hours.    Assessment/Plan:      * Cholangitis  Abdominal ultrasound noted gallstones with findings concerning for acute cholecystitis.  There are numerous mobile gallstones.  Gallbladder wall is mildly thickened measuring 4 mm.  No pericholecystic fluid is present.  Common duct is dilated measuring 14 mm.  Moderate intrahepatic biliary ductal dilation.     CT abdomen and pelvis without contrast noted intra and extrahepatic biliary ductal dilatation of uncertain etiology.  Follow-up MRCP would be helpful.  Suspect the presence of gallstones.  Massive enlargement of the prostate similar to previous study.  Previous left nephrectomy.  Stable rim calcified structure at the midpole of the right kidney.  There are distal colonic diverticula.  No evidence of acute diverticulitis.  No bowel obstruction no inflammation bowel.  Appendix is present.  No ascites.     -- AES consulted. Appreciate assistance   -- NPO   -- Continue broad spectrum abx and follow up infectious work up   -- Trending LFTs and renal function   Sp gwendolyn choly      Stage 4 chronic kidney disease  Baseline Cr: 2.8-3  Cr at admission to ICU: 3   Estimated Creatinine Clearance: 18.3 mL/min (A) (based on SCr of 3 mg/dL (H)).     -- CMP daily and trend   -- Avoid nephrotoxins   -- Strict I&O   -- Renally dose all medications to appropriate GFR / CrCl   -- MAP > 65 and hgb > 7 goals       VTE Risk Mitigation (From admission, onward)           Ordered     heparin (porcine) injection 5,000 Units  Every 8 hours         08/28/24 2339     IP VTE HIGH RISK PATIENT  Once         08/28/24 2339     Place sequential compression device  Until discontinued         08/28/24 2339                    Discharge Planning    RAMÓN: 9/6/2024     Code Status: Full Code   Is the patient medically ready for discharge?:     Reason for patient still in hospital (select all that apply): Patient unstable  Discharge Plan A: Home Health                  David Yi MD  Department of Hospital Medicine   [facility] - Telemetry Stepdown

## 2024-09-03 NOTE — PLAN OF CARE
Problem: Adult Inpatient Plan of Care  Goal: Plan of Care Review  Outcome: Progressing  Goal: Patient-Specific Goal (Individualized)  Outcome: Progressing  Goal: Absence of Hospital-Acquired Illness or Injury  Outcome: Progressing  Goal: Optimal Comfort and Wellbeing  Outcome: Progressing  Goal: Readiness for Transition of Care  Outcome: Progressing     Problem: Fall Injury Risk  Goal: Absence of Fall and Fall-Related Injury  Outcome: Progressing   Pt up to chair part of the day. VS WNL. No ss of pain or distress. Call light within reach. Bed in low position. Will continue POC.

## 2024-09-03 NOTE — SUBJECTIVE & OBJECTIVE
Interval History: Ready fro dc, SNF, OPAT recs are in.    Review of Systems  Objective:     Vital Signs (Most Recent):  Temp: 98.4 °F (36.9 °C) (09/03/24 0720)  Pulse: (!) 59 (09/03/24 0720)  Resp: 18 (09/03/24 0720)  BP: 136/74 (09/03/24 0720)  SpO2: 95 % (09/03/24 0900) Vital Signs (24h Range):  Temp:  [97.5 °F (36.4 °C)-98.8 °F (37.1 °C)] 98.4 °F (36.9 °C)  Pulse:  [46-92] 59  Resp:  [17-20] 18  SpO2:  [92 %-96 %] 95 %  BP: (136-170)/(71-82) 136/74     Weight: 68 kg (149 lb 14.6 oz)  Body mass index is 22.14 kg/m².    Intake/Output Summary (Last 24 hours) at 9/3/2024 0948  Last data filed at 9/3/2024 0558  Gross per 24 hour   Intake 677.02 ml   Output 900 ml   Net -222.98 ml         Physical Exam  Constitutional:       General: He is not in acute distress.     Appearance: Normal appearance.   HENT:      Head: Normocephalic and atraumatic.   Cardiovascular:      Rate and Rhythm: Normal rate and regular rhythm.   Pulmonary:      Effort: Pulmonary effort is normal. No respiratory distress.   Abdominal:      General: Abdomen is flat. There is no distension.      Palpations: Abdomen is soft.      Tenderness: There is no abdominal tenderness.      Comments: Well healed midline incision  Well healed robotic incisions   Neurological:      General: No focal deficit present.      Mental Status: He is alert and oriented to person, place, and time.   Psychiatric:         Behavior: Behavior normal.         Thought Content: Thought content normal.             Significant Labs: All pertinent labs within the past 24 hours have been reviewed.  BMP:   Recent Labs   Lab 09/03/24  0538   GLU 92      K 4.0      CO2 22*   BUN 41*   CREATININE 2.7*   CALCIUM 8.4*   MG 2.0       Significant Imaging: I have reviewed all pertinent imaging results/findings within the past 24 hours.

## 2024-09-03 NOTE — CONSULTS
ADAM consulted for Midline insertion in real time using ultrasound guidance.    Indication: Zosyn, EED 9/12/24  Gauge: 20  Location: Right Brachial  Length (cm): 8  Max dwell date: 10/2/24  Lot #: [id number]    Image saved and uploaded to EMR.

## 2024-09-04 LAB
ALBUMIN SERPL BCP-MCNC: 2.1 G/DL (ref 3.5–5.2)
ALP SERPL-CCNC: 145 U/L (ref 55–135)
ALT SERPL W/O P-5'-P-CCNC: 47 U/L (ref 10–44)
ANION GAP SERPL CALC-SCNC: 8 MMOL/L (ref 8–16)
AST SERPL-CCNC: 19 U/L (ref 10–40)
BASOPHILS # BLD AUTO: 0.03 K/UL (ref 0–0.2)
BASOPHILS NFR BLD: 0.4 % (ref 0–1.9)
BILIRUB SERPL-MCNC: 1 MG/DL (ref 0.1–1)
BUN SERPL-MCNC: 34 MG/DL (ref 8–23)
CALCIUM SERPL-MCNC: 8.1 MG/DL (ref 8.7–10.5)
CHLORIDE SERPL-SCNC: 109 MMOL/L (ref 95–110)
CO2 SERPL-SCNC: 20 MMOL/L (ref 23–29)
CREAT SERPL-MCNC: 2.5 MG/DL (ref 0.5–1.4)
DIFFERENTIAL METHOD BLD: ABNORMAL
EOSINOPHIL # BLD AUTO: 0.6 K/UL (ref 0–0.5)
EOSINOPHIL NFR BLD: 7.5 % (ref 0–8)
ERYTHROCYTE [DISTWIDTH] IN BLOOD BY AUTOMATED COUNT: 14.5 % (ref 11.5–14.5)
EST. GFR  (NO RACE VARIABLE): 25 ML/MIN/1.73 M^2
GLUCOSE SERPL-MCNC: 94 MG/DL (ref 70–110)
HCT VFR BLD AUTO: 36.2 % (ref 40–54)
HGB BLD-MCNC: 12.1 G/DL (ref 14–18)
IMM GRANULOCYTES # BLD AUTO: 0.23 K/UL (ref 0–0.04)
IMM GRANULOCYTES NFR BLD AUTO: 2.7 % (ref 0–0.5)
LYMPHOCYTES # BLD AUTO: 1.1 K/UL (ref 1–4.8)
LYMPHOCYTES NFR BLD: 12.6 % (ref 18–48)
MAGNESIUM SERPL-MCNC: 1.9 MG/DL (ref 1.6–2.6)
MCH RBC QN AUTO: 31.3 PG (ref 27–31)
MCHC RBC AUTO-ENTMCNC: 33.4 G/DL (ref 32–36)
MCV RBC AUTO: 94 FL (ref 82–98)
MONOCYTES # BLD AUTO: 1 K/UL (ref 0.3–1)
MONOCYTES NFR BLD: 11.9 % (ref 4–15)
NEUTROPHILS # BLD AUTO: 5.5 K/UL (ref 1.8–7.7)
NEUTROPHILS NFR BLD: 64.9 % (ref 38–73)
NRBC BLD-RTO: 0 /100 WBC
PHOSPHATE SERPL-MCNC: 2.8 MG/DL (ref 2.7–4.5)
PLATELET # BLD AUTO: 194 K/UL (ref 150–450)
PMV BLD AUTO: 11.6 FL (ref 9.2–12.9)
POTASSIUM SERPL-SCNC: 3.7 MMOL/L (ref 3.5–5.1)
PROT SERPL-MCNC: 5.4 G/DL (ref 6–8.4)
RBC # BLD AUTO: 3.87 M/UL (ref 4.6–6.2)
SODIUM SERPL-SCNC: 137 MMOL/L (ref 136–145)
WBC # BLD AUTO: 8.43 K/UL (ref 3.9–12.7)

## 2024-09-04 PROCEDURE — 94761 N-INVAS EAR/PLS OXIMETRY MLT: CPT

## 2024-09-04 PROCEDURE — 63600175 PHARM REV CODE 636 W HCPCS

## 2024-09-04 PROCEDURE — 21400001 HC TELEMETRY ROOM

## 2024-09-04 PROCEDURE — 36415 COLL VENOUS BLD VENIPUNCTURE: CPT

## 2024-09-04 PROCEDURE — 25000003 PHARM REV CODE 250: Performed by: INTERNAL MEDICINE

## 2024-09-04 PROCEDURE — A4216 STERILE WATER/SALINE, 10 ML: HCPCS | Performed by: INTERNAL MEDICINE

## 2024-09-04 PROCEDURE — 25000003 PHARM REV CODE 250: Performed by: NURSE PRACTITIONER

## 2024-09-04 PROCEDURE — 25000003 PHARM REV CODE 250: Performed by: HOSPITALIST

## 2024-09-04 PROCEDURE — 63600175 PHARM REV CODE 636 W HCPCS: Performed by: INTERNAL MEDICINE

## 2024-09-04 PROCEDURE — 84100 ASSAY OF PHOSPHORUS: CPT

## 2024-09-04 PROCEDURE — 85025 COMPLETE CBC W/AUTO DIFF WBC: CPT

## 2024-09-04 PROCEDURE — 27000207 HC ISOLATION

## 2024-09-04 PROCEDURE — 83735 ASSAY OF MAGNESIUM: CPT

## 2024-09-04 PROCEDURE — 80053 COMPREHEN METABOLIC PANEL: CPT

## 2024-09-04 RX ADMIN — HEPARIN SODIUM 5000 UNITS: 5000 INJECTION INTRAVENOUS; SUBCUTANEOUS at 08:09

## 2024-09-04 RX ADMIN — HEPARIN SODIUM 5000 UNITS: 5000 INJECTION INTRAVENOUS; SUBCUTANEOUS at 06:09

## 2024-09-04 RX ADMIN — TAMSULOSIN HYDROCHLORIDE 0.4 MG: 0.4 CAPSULE ORAL at 08:09

## 2024-09-04 RX ADMIN — Medication 10 ML: at 06:09

## 2024-09-04 RX ADMIN — Medication 10 ML: at 11:09

## 2024-09-04 RX ADMIN — FLUDROCORTISONE ACETATE 100 MCG: 0.1 TABLET ORAL at 06:09

## 2024-09-04 RX ADMIN — PIPERACILLIN SODIUM AND TAZOBACTAM SODIUM 4.5 G: 4; .5 INJECTION, POWDER, FOR SOLUTION INTRAVENOUS at 11:09

## 2024-09-04 RX ADMIN — Medication 10 ML: at 12:09

## 2024-09-04 RX ADMIN — FINASTERIDE 5 MG: 5 TABLET, FILM COATED ORAL at 08:09

## 2024-09-04 RX ADMIN — PIPERACILLIN SODIUM AND TAZOBACTAM SODIUM 4.5 G: 4; .5 INJECTION, POWDER, FOR SOLUTION INTRAVENOUS at 08:09

## 2024-09-04 RX ADMIN — Medication 10 ML: at 08:09

## 2024-09-04 RX ADMIN — ALLOPURINOL 50 MG: 300 TABLET ORAL at 08:09

## 2024-09-04 RX ADMIN — PIPERACILLIN SODIUM AND TAZOBACTAM SODIUM 4.5 G: 4; .5 INJECTION, POWDER, FOR SOLUTION INTRAVENOUS at 03:09

## 2024-09-04 RX ADMIN — Medication 1 CAPSULE: at 08:09

## 2024-09-04 NOTE — NURSING
Pt has D/c orders, but is waiting on IV infusion company to do more training. Pt states he is uncomfortable with the entire process.

## 2024-09-04 NOTE — PLAN OF CARE
Problem: Adult Inpatient Plan of Care  Goal: Plan of Care Review  Outcome: Progressing  Goal: Patient-Specific Goal (Individualized)  Outcome: Progressing  Goal: Absence of Hospital-Acquired Illness or Injury  Outcome: Progressing  Goal: Optimal Comfort and Wellbeing  Outcome: Progressing  Goal: Readiness for Transition of Care  Outcome: Progressing      wife bedside

## 2024-09-04 NOTE — SUBJECTIVE & OBJECTIVE
Interval History:  Patient states he is feeling okay today.  Intermittently with abdominal pain- currently controlled with current regimen. Patient confirms he does not feel safe giving himself the antibiotics at home. He has no help at home.     Review of Systems  A comprehensive review of systems was negative except as noted above.     Objective:     Vital Signs (Most Recent):  Temp: 97.4 °F (36.3 °C) (09/05/24 1501)  Pulse: 63 (09/05/24 1501)  Resp: 19 (09/05/24 1501)  BP: 116/72 (09/05/24 1501)  SpO2: (!) 93 % (09/05/24 1501) Vital Signs (24h Range):  Temp:  [97.3 °F (36.3 °C)-98.3 °F (36.8 °C)] 97.4 °F (36.3 °C)  Pulse:  [51-75] 63  Resp:  [16-19] 19  SpO2:  [93 %-98 %] 93 %  BP: (116-152)/(61-80) 116/72     Weight: 68 kg (149 lb 14.6 oz)  Body mass index is 22.14 kg/m².    Intake/Output Summary (Last 24 hours) at 9/5/2024 1656  Last data filed at 9/5/2024 0539  Gross per 24 hour   Intake --   Output 650 ml   Net -650 ml         Physical Exam  Constitutional:       General: He is not in acute distress.     Appearance: Normal appearance.   HENT:      Head: Normocephalic and atraumatic.   Cardiovascular:      Rate and Rhythm: Normal rate and regular rhythm.   Pulmonary:      Effort: Pulmonary effort is normal. No respiratory distress.   Abdominal:      General: Abdomen is flat. There is no distension.      Palpations: Abdomen is soft.      Tenderness: There is no abdominal tenderness.      Comments: Well healed midline incision  Well healed robotic incisions   Neurological:      General: No focal deficit present.      Mental Status: He is alert and oriented to person, place, and time.   Psychiatric:         Behavior: Behavior normal.         Thought Content: Thought content normal.             Significant Labs: All pertinent labs within the past 24 hours have been reviewed.  BMP:   Recent Labs   Lab 09/05/24  0359   GLU 86      K 4.2   *   CO2 19*   BUN 34*   CREATININE 2.7*   CALCIUM 8.4*   MG 1.9        Significant Imaging: I have reviewed all pertinent imaging results/findings within the past 24 hours.

## 2024-09-04 NOTE — PLAN OF CARE
Pt unable to complete training to do Home IV abx. States he doesn't feel comfortable to do it himself. Pt in need of only 9 more days of IV abx.     Edward Lee, [facility]    [facility]  [phone]

## 2024-09-04 NOTE — HOSPITAL COURSE
8/29 ERCP (see separate note in procedures tab) with removal of CBD stones and placement of temporary CBD stent in setting of presumed cholangitis; numerous GB stones also seen during cholangiogram.  General surgery consulted.  Patient taken for laparoscopic cholecystectomy on 08/30.  Tolerated this well.  ID consulted for antibiotic recommendations.  9/04 OPAT recs are in. Zosyn 4.5g IV q 12 hours through 9/12. Is ready for dc, but he doesn't feel comfortable with home infusion, will explore options. Asked ID if there is a once per day option to be given at infusion center, but there are no options for that.

## 2024-09-05 LAB
ALBUMIN SERPL BCP-MCNC: 2.2 G/DL (ref 3.5–5.2)
ALP SERPL-CCNC: 145 U/L (ref 55–135)
ALT SERPL W/O P-5'-P-CCNC: 42 U/L (ref 10–44)
ANION GAP SERPL CALC-SCNC: 9 MMOL/L (ref 8–16)
AST SERPL-CCNC: 23 U/L (ref 10–40)
BASOPHILS # BLD AUTO: 0.02 K/UL (ref 0–0.2)
BASOPHILS NFR BLD: 0.2 % (ref 0–1.9)
BILIRUB SERPL-MCNC: 0.9 MG/DL (ref 0.1–1)
BUN SERPL-MCNC: 34 MG/DL (ref 8–23)
CALCIUM SERPL-MCNC: 8.4 MG/DL (ref 8.7–10.5)
CHLORIDE SERPL-SCNC: 113 MMOL/L (ref 95–110)
CO2 SERPL-SCNC: 19 MMOL/L (ref 23–29)
CREAT SERPL-MCNC: 2.7 MG/DL (ref 0.5–1.4)
DIFFERENTIAL METHOD BLD: ABNORMAL
EOSINOPHIL # BLD AUTO: 0.4 K/UL (ref 0–0.5)
EOSINOPHIL NFR BLD: 5.3 % (ref 0–8)
ERYTHROCYTE [DISTWIDTH] IN BLOOD BY AUTOMATED COUNT: 14.4 % (ref 11.5–14.5)
EST. GFR  (NO RACE VARIABLE): 22.8 ML/MIN/1.73 M^2
GLUCOSE SERPL-MCNC: 86 MG/DL (ref 70–110)
HCT VFR BLD AUTO: 37.4 % (ref 40–54)
HGB BLD-MCNC: 11.8 G/DL (ref 14–18)
IMM GRANULOCYTES # BLD AUTO: 0.21 K/UL (ref 0–0.04)
IMM GRANULOCYTES NFR BLD AUTO: 2.6 % (ref 0–0.5)
LYMPHOCYTES # BLD AUTO: 1.1 K/UL (ref 1–4.8)
LYMPHOCYTES NFR BLD: 13.3 % (ref 18–48)
MAGNESIUM SERPL-MCNC: 1.9 MG/DL (ref 1.6–2.6)
MCH RBC QN AUTO: 29.7 PG (ref 27–31)
MCHC RBC AUTO-ENTMCNC: 31.6 G/DL (ref 32–36)
MCV RBC AUTO: 94 FL (ref 82–98)
MONOCYTES # BLD AUTO: 1 K/UL (ref 0.3–1)
MONOCYTES NFR BLD: 12 % (ref 4–15)
NEUTROPHILS # BLD AUTO: 5.5 K/UL (ref 1.8–7.7)
NEUTROPHILS NFR BLD: 66.6 % (ref 38–73)
NRBC BLD-RTO: 0 /100 WBC
PHOSPHATE SERPL-MCNC: 3.4 MG/DL (ref 2.7–4.5)
PLATELET # BLD AUTO: 238 K/UL (ref 150–450)
PMV BLD AUTO: 11.8 FL (ref 9.2–12.9)
POTASSIUM SERPL-SCNC: 4.2 MMOL/L (ref 3.5–5.1)
PROT SERPL-MCNC: 5.7 G/DL (ref 6–8.4)
RBC # BLD AUTO: 3.97 M/UL (ref 4.6–6.2)
SODIUM SERPL-SCNC: 141 MMOL/L (ref 136–145)
WBC # BLD AUTO: 8.18 K/UL (ref 3.9–12.7)

## 2024-09-05 PROCEDURE — 80053 COMPREHEN METABOLIC PANEL: CPT

## 2024-09-05 PROCEDURE — 25000003 PHARM REV CODE 250: Performed by: STUDENT IN AN ORGANIZED HEALTH CARE EDUCATION/TRAINING PROGRAM

## 2024-09-05 PROCEDURE — 25000003 PHARM REV CODE 250: Performed by: NURSE PRACTITIONER

## 2024-09-05 PROCEDURE — 21400001 HC TELEMETRY ROOM

## 2024-09-05 PROCEDURE — 85025 COMPLETE CBC W/AUTO DIFF WBC: CPT

## 2024-09-05 PROCEDURE — 83735 ASSAY OF MAGNESIUM: CPT

## 2024-09-05 PROCEDURE — 84100 ASSAY OF PHOSPHORUS: CPT

## 2024-09-05 PROCEDURE — 97165 OT EVAL LOW COMPLEX 30 MIN: CPT

## 2024-09-05 PROCEDURE — 63600175 PHARM REV CODE 636 W HCPCS: Performed by: INTERNAL MEDICINE

## 2024-09-05 PROCEDURE — 25000003 PHARM REV CODE 250: Performed by: INTERNAL MEDICINE

## 2024-09-05 PROCEDURE — A4216 STERILE WATER/SALINE, 10 ML: HCPCS | Performed by: INTERNAL MEDICINE

## 2024-09-05 PROCEDURE — 36415 COLL VENOUS BLD VENIPUNCTURE: CPT

## 2024-09-05 PROCEDURE — 27000207 HC ISOLATION

## 2024-09-05 PROCEDURE — 25000003 PHARM REV CODE 250: Performed by: HOSPITALIST

## 2024-09-05 PROCEDURE — 97530 THERAPEUTIC ACTIVITIES: CPT

## 2024-09-05 PROCEDURE — 63600175 PHARM REV CODE 636 W HCPCS

## 2024-09-05 RX ADMIN — TAMSULOSIN HYDROCHLORIDE 0.4 MG: 0.4 CAPSULE ORAL at 08:09

## 2024-09-05 RX ADMIN — PIPERACILLIN SODIUM AND TAZOBACTAM SODIUM 4.5 G: 4; .5 INJECTION, POWDER, FOR SOLUTION INTRAVENOUS at 11:09

## 2024-09-05 RX ADMIN — HEPARIN SODIUM 5000 UNITS: 5000 INJECTION INTRAVENOUS; SUBCUTANEOUS at 05:09

## 2024-09-05 RX ADMIN — FINASTERIDE 5 MG: 5 TABLET, FILM COATED ORAL at 08:09

## 2024-09-05 RX ADMIN — Medication 10 ML: at 06:09

## 2024-09-05 RX ADMIN — PIPERACILLIN SODIUM AND TAZOBACTAM SODIUM 4.5 G: 4; .5 INJECTION, POWDER, FOR SOLUTION INTRAVENOUS at 03:09

## 2024-09-05 RX ADMIN — Medication 1 CAPSULE: at 08:09

## 2024-09-05 RX ADMIN — HEPARIN SODIUM 5000 UNITS: 5000 INJECTION INTRAVENOUS; SUBCUTANEOUS at 09:09

## 2024-09-05 RX ADMIN — Medication 10 ML: at 11:09

## 2024-09-05 RX ADMIN — HEPARIN SODIUM 5000 UNITS: 5000 INJECTION INTRAVENOUS; SUBCUTANEOUS at 02:09

## 2024-09-05 RX ADMIN — COLCHICINE 0.3 MG: 0.6 TABLET, FILM COATED ORAL at 06:09

## 2024-09-05 RX ADMIN — OXYCODONE 5 MG: 5 TABLET ORAL at 05:09

## 2024-09-05 RX ADMIN — PIPERACILLIN SODIUM AND TAZOBACTAM SODIUM 4.5 G: 4; .5 INJECTION, POWDER, FOR SOLUTION INTRAVENOUS at 06:09

## 2024-09-05 RX ADMIN — Medication 10 ML: at 05:09

## 2024-09-05 RX ADMIN — Medication 10 ML: at 12:09

## 2024-09-05 RX ADMIN — FLUDROCORTISONE ACETATE 100 MCG: 0.1 TABLET ORAL at 07:09

## 2024-09-05 NOTE — PT/OT/SLP EVAL
"Occupational Therapy   Evaluation & Treatment    Name: Josh Pinto  MRN: [id number]  Admitting Diagnosis: Cholangitis  Recent Surgery: Procedure(s) (LRB):  CHOLECYSTECTOMY, LAPAROSCOPIC (N/A) 6 Days Post-Op    Recommendations:     Discharge Recommendations: Low Intensity Therapy  Discharge Equipment Recommendations:  none  Barriers to discharge:  Decreased caregiver support    Assessment:     Josh Pinto is a 84 y.o. male with a medical diagnosis of Cholangitis.  He presents with the following performance deficits affecting function: weakness, impaired endurance, impaired self care skills, impaired functional mobility, gait instability, impaired balance, decreased safety awareness.      Pt agreeable to session. Pt demonstrating mild gait instability, generalized weakness, and decreased activity tolerance as compared to his baseline. Pt would benefit from skilled OT services in the acute care setting to promote increased participation in self-care routines, increase functional endurance and strength needed for safety with ambulation and functional transfers, and to facilitate a return to PLOF and least restrictive home environment.     Rehab Prognosis: Good; patient would benefit from acute skilled OT services to address these deficits and reach maximum level of function.       Plan:     Patient to be seen 3 x/week to address the above listed problems via self-care/home management, therapeutic activities, therapeutic exercises, neuromuscular re-education  Plan of Care Expires: 10/03/24  Plan of Care Reviewed with: patient    Subjective     Chief Complaint: mild knee pain after ambulation  Patient/Family Comments/goals: "I've been getting up and going to the bathroom on my own."    Occupational Profile:  Living Environment: Pt lives alone in a [facility] with 0 ROSE MARY. He has access to both a walk-in shower with a built-in bench and grab bars and a tub/shower combo with grab bars.   Previous level of function: IND with " ADLs  Roles and Routines: Pt is retired and still drives. He enjoys investing, fishing, RC piloting in his free time.   Equipment Used at Home: bath bench, grab bar  Assistance upon Discharge: pt unsure of support available after DC at this time (pt has two sisters who live in [place] and [place])    Pain/Comfort:  Pain Rating 1: other (see comments) (did not rate)  Location - Side 1: Right  Location - Orientation 1: generalized  Location 1: knee  Pain Addressed 1: Cessation of Activity, Reposition, Nurse notified  Pain Rating Post-Intervention 1:  (did not rate)    Patients cultural, spiritual, Restorationist conflicts given the current situation: no    Objective:     Communicated with: Nursing prior to session.  Patient found supine with peripheral IV, telemetry upon OT entry to room.    General Precautions: Standard, fall, contact  Orthopedic Precautions: N/A  Braces: N/A  Respiratory Status: Room air    Occupational Performance:    Bed Mobility:    Patient completed Scooting/Bridging with stand by assistance  Patient completed Supine to Sit with stand by assistance    Functional Mobility/Transfers:  Patient completed Sit <> Stand Transfer with stand by assistance  with  no assistive device   Patient completed Toilet Transfer Stand Pivot technique with stand by assistance with  no AD and grab bars  Functional Mobility: Pt able to tolerate ambulating throughout room and bathroom to simulate household/community distances with no AD and SBA provided for safety and IV management. No LOB or SOB noted.    Activities of Daily Living:  Upper Body Dressing: minimum assistance for [facility] gown around backside  Toileting: supervision - pt voiding into urinal in supine prior to ambulation    Cognitive/Visual Perceptual:  Cognitive/Psychosocial Skills:     -       Oriented to: Person, Place, Time, and Situation   -       Follows Commands/attention:Follows two-step commands  -       Communication: clear/fluent  -        Memory: No Deficits noted  -       Safety awareness/insight to disability: intact   -       Mood/Affect/Coping skills/emotional control: Appropriate to situation, Cooperative, and Pleasant    Physical Exam:  Postural examination/scapula alignment:    -       No postural abnormalities identified  Sensation:    -       Intact  Upper Extremity Range of Motion:     -       Right Upper Extremity: WNL  -       Left Upper Extremity: WNL  Upper Extremity Strength:    -       Right Upper Extremity: WNL  -       Left Upper Extremity: WNL   Strength:    -       Right Upper Extremity: WNL  -       Left Upper Extremity: WNL  Fine Motor Coordination:    -       Intact  Left hand thumb/finger opposition skills and Right hand thumb/finger opposition skills    AMPAC 6 Click ADL:  AMPAC Total Score: 21    Treatment & Education:  Provided education on the role of OT, POC, and therapy goals while in the acute care setting. Provided education on the importance of continued mobilization and participation in OOB activities to increase functional endurance and activity tolerance for increased participation in ADL routines. Provided education on safe transfer techniques and proper hand placement to promote safety awareness and prevent falls with functional transfers. Instructed pt to always have staff present when ambulatory to promote safety and prevent falls. All questions within the scope of OT answered, and pt verbalized understanding. White board updated.     Patient left up in chair with all lines intact, call button in reach, and nursing notified    GOALS:   Multidisciplinary Problems       Occupational Therapy Goals          Problem: Occupational Therapy    Goal Priority Disciplines Outcome Interventions   Occupational Therapy Goal     OT, PT/OT Progressing    Description: Goals to be met by: 10/3/24     Patient will increase functional independence with ADLs by performing:    UE Dressing with Modified [place].  LE Dressing  with Modified [place].  Grooming while standing at sink with Modified [place].  Toileting from toilet with Modified [place] for hygiene and clothing management.   Toilet transfer to toilet with Modified [place].                         History:     Past Medical History:   Diagnosis Date    Allergy     Arthritis     Cataract     Hypertension          Past Surgical History:   Procedure Laterality Date    BLADDER FULGURATION N/A 8/19/2023    Procedure: FULGURATION, BLADDER;  Surgeon: Abdulaziz Garcia MD;  Location: [facility] OR;  Service: Urology;  Laterality: N/A;    CYSTOSCOPY N/A 8/19/2023    Procedure: CYSTOSCOPY;  Surgeon: Abdulaziz Garcia MD;  Location: [facility] OR;  Service: Urology;  Laterality: N/A;    ERCP N/A 8/29/2024    Procedure: ERCP (ENDOSCOPIC RETROGRADE CHOLANGIOPANCREATOGRAPHY);  Surgeon: Truman Vargas MD;  Location: [facility] (2ND FLR);  Service: Endoscopy;  Laterality: N/A;    ESOPHAGOGASTRODUODENOSCOPY N/A 9/20/2023    Procedure: EGD (ESOPHAGOGASTRODUODENOSCOPY);  Surgeon: Varinder Melo MD;  Location: [facility] ENDO;  Service: Gastroenterology;  Laterality: N/A;    HERNIA REPAIR N/A 9/3/2023    Procedure: REPAIR, HERNIA INTERNAL;  Surgeon: Andrew Juares MD;  Location: [facility] OR;  Service: General;  Laterality: N/A;    INSERTION OF CATHETER N/A 8/19/2023    Procedure: INSERTION, CATHETER;  Surgeon: Abdulaziz Garcia MD;  Location: [facility] OR;  Service: Urology;  Laterality: N/A;    knee surgery      right knee    LAPAROSCOPIC CHOLECYSTECTOMY N/A 8/30/2024    Procedure: CHOLECYSTECTOMY, LAPAROSCOPIC;  Surgeon: Jenae Rasmussen MD;  Location: [facility] OR 2ND FLR;  Service: General;  Laterality: N/A;    LAPAROSCOPIC ROBOT-ASSISTED SURGICAL REMOVAL OF KIDNEY USING DA EVIN XI Left 8/20/2023    Procedure: XI ROBOTIC NEPHRECTOMY;  Surgeon: Abdulaziz Garcia MD;  Location: [facility] OR;  Service: Urology;  Laterality: Left;    LAPAROTOMY, EXPLORATORY N/A 9/3/2023    Procedure:  LAPAROTOMY, EXPLORATORY;  Surgeon: Andrew Juares MD;  Location: [facility];  Service: General;  Laterality: N/A;       Time Tracking:     OT Date of Treatment: 09/05/24  OT Start Time: 1417  OT Stop Time: 1436  OT Total Time (min): 19 min    Billable Minutes:Evaluation 9 minutes  Therapeutic Activity 10 minutes    9/5/2024

## 2024-09-05 NOTE — PROGRESS NOTES
[facility] - Telemetry [facility] Medicine  Progress Note    Patient Name: Josh Pinto  MRN: [id number]  Patient Class: IP- Inpatient   Admission Date: 8/28/2024  Length of Stay: 8 days  Attending Physician: Chula Stokes MD  Primary Care Provider: Hali Burrell MD        Subjective:     Principal Problem:Cholangitis        HPI:  No notes on file    Overview/Hospital Course:  8/29 ERCP (see separate note in procedures tab) with removal of CBD stones and placement of temporary CBD stent in setting of presumed cholangitis; numerous GB stones also seen during cholangiogram.  General surgery consulted.  Patient taken for laparoscopic cholecystectomy on 08/30.  Tolerated this well.  ID consulted for antibiotic recommendations.  9/04 OPAT recs are in. Is ready for dc, but he doesn't feel comfortable with home infusion, will explore options. Asked ID if there is a once per day option to be given at infusion center, but they there aren't any.    Interval History:  Patient states he is feeling okay today.  Intermittently with abdominal pain- currently controlled with current regimen. Patient confirms he does not feel safe giving himself the antibiotics at home. He has no help at home.     Review of Systems  A comprehensive review of systems was negative except as noted above.     Objective:     Vital Signs (Most Recent):  Temp: 97.4 °F (36.3 °C) (09/05/24 1501)  Pulse: 63 (09/05/24 1501)  Resp: 19 (09/05/24 1501)  BP: 116/72 (09/05/24 1501)  SpO2: (!) 93 % (09/05/24 1501) Vital Signs (24h Range):  Temp:  [97.3 °F (36.3 °C)-98.3 °F (36.8 °C)] 97.4 °F (36.3 °C)  Pulse:  [51-75] 63  Resp:  [16-19] 19  SpO2:  [93 %-98 %] 93 %  BP: (116-152)/(61-80) 116/72     Weight: 68 kg (149 lb 14.6 oz)  Body mass index is 22.14 kg/m².    Intake/Output Summary (Last 24 hours) at 9/5/2024 1656  Last data filed at 9/5/2024 0539  Gross per 24 hour   Intake --   Output 650 ml   Net -650 ml         Physical Exam  Constitutional:        General: He is not in acute distress.     Appearance: Normal appearance.   HENT:      Head: Normocephalic and atraumatic.   Cardiovascular:      Rate and Rhythm: Normal rate and regular rhythm.   Pulmonary:      Effort: Pulmonary effort is normal. No respiratory distress.   Abdominal:      General: Abdomen is flat. There is no distension.      Palpations: Abdomen is soft.      Tenderness: There is no abdominal tenderness.      Comments: Well healed midline incision  Well healed robotic incisions   Neurological:      General: No focal deficit present.      Mental Status: He is alert and oriented to person, place, and time.   Psychiatric:         Behavior: Behavior normal.         Thought Content: Thought content normal.             Significant Labs: All pertinent labs within the past 24 hours have been reviewed.  BMP:   Recent Labs   Lab 09/05/24  0359   GLU 86      K 4.2   *   CO2 19*   BUN 34*   CREATININE 2.7*   CALCIUM 8.4*   MG 1.9       Significant Imaging: I have reviewed all pertinent imaging results/findings within the past 24 hours.    Assessment/Plan:      * Cholangitis  Abdominal ultrasound noted gallstones with findings concerning for acute cholecystitis.  There are numerous mobile gallstones.  Gallbladder wall is mildly thickened measuring 4 mm.  No pericholecystic fluid is present.  Common duct is dilated measuring 14 mm.  Moderate intrahepatic biliary ductal dilation.     -CT abdomen and pelvis without contrast noted intra and extrahepatic biliary ductal dilatation of uncertain etiology.  Follow-up MRCP would be helpful.  Suspect the presence of gallstones.  Massive enlargement of the prostate similar to previous study.  Previous left nephrectomy.  Stable rim calcified structure at the midpole of the right kidney.  There are distal colonic diverticula.  No evidence of acute diverticulitis.  No bowel obstruction no inflammation bowel.       -- AES consulted. Appreciate assistance 8/29  ERCP (see separate note in procedures tab) with removal of CBD stones and placement of temporary CBD stent in setting of presumed cholangitis; numerous GB stones also seen during cholangiogram.  General surgery consulted.  Patient taken for laparoscopic cholecystectomy on 08/30.  Tolerated this well.   -- continue antibiotics per ID   -- Trending LFTs and renal function     Acute hypoxemic respiratory failure  -improved with treatment    Bacteremia  -Blood cultures positive for Klebsiella oxytocia.  Rapid ID also positive for Pseudomonas aeruginosa, though this was not grown in culture.  Repeat blood cultures negative.  -Plan is to dc home on Zosyn, but he is uncomfortable with home infusion.  -Dicussed with ID, no other abx options.  -Placement?      PAF (paroxysmal atrial fibrillation), [id number] score 3, decline OAC  -Patient with Paroxysmal (<7 days) atrial fibrillation which is controlled currently.   [id number] Score: 2.   -Patient does not appear to be on home anticoagulation.  We will clarify this.  Continue prophylactic heparin dosing for now  -holding home metoprolol with bradycardia    HTN (hypertension), dx 1985  -Chronic, controlled. Latest blood pressure and vitals reviewed-     Temp:  [97.3 °F (36.3 °C)-98.3 °F (36.8 °C)]   Pulse:  [51-75]   Resp:  [16-19]   BP: (116-152)/(61-80)   SpO2:  [93 %-98 %] .   Home meds for hypertension were reviewed and noted below.   Hypertension Medications               metoprolol tartrate (LOPRESSOR) 25 MG tablet Take 12.5 mg by mouth 2 (two) times daily.       While in the hospital, will manage blood pressure as follows; holding Lopressor for bradycardia    Stage 4 chronic kidney disease  Baseline Cr: 2.8-3  Cr at admission to ICU: 3   Estimated Creatinine Clearance: 18.3 mL/min (A) (based on SCr of 3 mg/dL (H)).     -- CMP daily and trend   -- Avoid nephrotoxins   -- Strict I&O   -- Renally dose all medications to appropriate GFR / CrCl   -- MAP > 65 and hgb > 7  goals     BPH with urinary obstruction  Continue home Flomax        VTE Risk Mitigation (From admission, onward)           Ordered     heparin (porcine) injection 5,000 Units  Every 8 hours         08/28/24 2339     IP VTE HIGH RISK PATIENT  Once         08/28/24 2339     Place sequential compression device  Until discontinued         08/28/24 2339                    Discharge Planning   RAMÓN: 9/6/2024     Code Status: Full Code   Is the patient medically ready for discharge?:     Reason for patient still in hospital (select all that apply): Pending disposition  Discharge Plan A: Skilled Nursing Facility   Discharge Delays: None known at this time        Continue inpatient. Patient requires IV Zosyn through 09/12/24 - he is refusing home IV therapy. We are working with CM on options, may have to remain inpatient for duration of therapy        Chula Stokes MD  Department of Hospital Medicine  [facility]  09/05/2024

## 2024-09-05 NOTE — ASSESSMENT & PLAN NOTE
-Patient with Paroxysmal (<7 days) atrial fibrillation which is controlled currently.   [id number] Score: 2.   -Patient does not appear to be on home anticoagulation.  We will clarify this.  Continue prophylactic heparin dosing for now  -holding home metoprolol with bradycardia

## 2024-09-05 NOTE — ASSESSMENT & PLAN NOTE
-Chronic, controlled. Latest blood pressure and vitals reviewed-     Temp:  [97.3 °F (36.3 °C)-98.3 °F (36.8 °C)]   Pulse:  [51-75]   Resp:  [16-19]   BP: (116-152)/(61-80)   SpO2:  [93 %-98 %] .   Home meds for hypertension were reviewed and noted below.   Hypertension Medications               metoprolol tartrate (LOPRESSOR) 25 MG tablet Take 12.5 mg by mouth 2 (two) times daily.       While in the hospital, will manage blood pressure as follows; holding Lopressor for bradycardia

## 2024-09-05 NOTE — ASSESSMENT & PLAN NOTE
-Blood cultures positive for Klebsiella oxytocia.  Rapid ID also positive for Pseudomonas aeruginosa, though this was not grown in culture.  Repeat blood cultures negative.  -Plan is to dc home on Zosyn, but he is uncomfortable with home infusion.  -Dicussed with ID, no other abx options.  -Placement?

## 2024-09-05 NOTE — PLAN OF CARE
Problem: Occupational Therapy  Goal: Occupational Therapy Goal  Description: Goals to be met by: 10/3/24     Patient will increase functional independence with ADLs by performing:    UE Dressing with Modified [place].  LE Dressing with Modified [place].  Grooming while standing at sink with Modified [place].  Toileting from toilet with Modified [place] for hygiene and clothing management.   Toilet transfer to toilet with Modified [place].    Outcome: Progressing     OT eval completed and goals established.

## 2024-09-05 NOTE — ASSESSMENT & PLAN NOTE
Abdominal ultrasound noted gallstones with findings concerning for acute cholecystitis.  There are numerous mobile gallstones.  Gallbladder wall is mildly thickened measuring 4 mm.  No pericholecystic fluid is present.  Common duct is dilated measuring 14 mm.  Moderate intrahepatic biliary ductal dilation.     -CT abdomen and pelvis without contrast noted intra and extrahepatic biliary ductal dilatation of uncertain etiology.  Follow-up MRCP would be helpful.  Suspect the presence of gallstones.  Massive enlargement of the prostate similar to previous study.  Previous left nephrectomy.  Stable rim calcified structure at the midpole of the right kidney.  There are distal colonic diverticula.  No evidence of acute diverticulitis.  No bowel obstruction no inflammation bowel.       -- AES consulted. Appreciate assistance 8/29 ERCP (see separate note in procedures tab) with removal of CBD stones and placement of temporary CBD stent in setting of presumed cholangitis; numerous GB stones also seen during cholangiogram.  General surgery consulted.  Patient taken for laparoscopic cholecystectomy on 08/30.  Tolerated this well.   -- continue antibiotics per ID   -- Trending LFTs and renal function

## 2024-09-05 NOTE — PLAN OF CARE
09/05/24 1225   Post-Acute Status   Post-Acute Authorization Placement   Post-Acute Placement Status Referrals Sent   Discharge Delays None known at this time   Discharge Plan   Discharge Plan A Skilled Nursing Facility   Discharge Plan B Home;Home Health     Pt unable to do home Iv abx. SNF referrals made to in network facilities:  [facility] ([facility]    Will continue to follow and see if any acceptance. Discharge Plan A and Plan B have been determined by review of patient's clinical status, future medical and therapeutic needs, and coverage/benefits for post-acute care in coordination with multidisciplinary team members.    Edward Lee, [facility]    [facility]  [phone]

## 2024-09-06 LAB
ANION GAP SERPL CALC-SCNC: 7 MMOL/L (ref 8–16)
BUN SERPL-MCNC: 31 MG/DL (ref 8–23)
CALCIUM SERPL-MCNC: 8.8 MG/DL (ref 8.7–10.5)
CHLORIDE SERPL-SCNC: 111 MMOL/L (ref 95–110)
CO2 SERPL-SCNC: 20 MMOL/L (ref 23–29)
CREAT SERPL-MCNC: 2.7 MG/DL (ref 0.5–1.4)
EST. GFR  (NO RACE VARIABLE): 22.8 ML/MIN/1.73 M^2
GLUCOSE SERPL-MCNC: 83 MG/DL (ref 70–110)
POTASSIUM SERPL-SCNC: 3.8 MMOL/L (ref 3.5–5.1)
SODIUM SERPL-SCNC: 138 MMOL/L (ref 136–145)

## 2024-09-06 PROCEDURE — 27000207 HC ISOLATION

## 2024-09-06 PROCEDURE — 80048 BASIC METABOLIC PNL TOTAL CA: CPT | Performed by: STUDENT IN AN ORGANIZED HEALTH CARE EDUCATION/TRAINING PROGRAM

## 2024-09-06 PROCEDURE — 21400001 HC TELEMETRY ROOM

## 2024-09-06 PROCEDURE — 97535 SELF CARE MNGMENT TRAINING: CPT | Mod: CO

## 2024-09-06 PROCEDURE — 63600175 PHARM REV CODE 636 W HCPCS

## 2024-09-06 PROCEDURE — 63600175 PHARM REV CODE 636 W HCPCS: Performed by: INTERNAL MEDICINE

## 2024-09-06 PROCEDURE — 25000003 PHARM REV CODE 250: Performed by: NURSE PRACTITIONER

## 2024-09-06 PROCEDURE — 25000003 PHARM REV CODE 250: Performed by: STUDENT IN AN ORGANIZED HEALTH CARE EDUCATION/TRAINING PROGRAM

## 2024-09-06 PROCEDURE — 97161 PT EVAL LOW COMPLEX 20 MIN: CPT

## 2024-09-06 PROCEDURE — 36415 COLL VENOUS BLD VENIPUNCTURE: CPT | Performed by: STUDENT IN AN ORGANIZED HEALTH CARE EDUCATION/TRAINING PROGRAM

## 2024-09-06 PROCEDURE — 25000003 PHARM REV CODE 250: Performed by: INTERNAL MEDICINE

## 2024-09-06 PROCEDURE — 25000003 PHARM REV CODE 250: Performed by: HOSPITALIST

## 2024-09-06 PROCEDURE — A4216 STERILE WATER/SALINE, 10 ML: HCPCS | Performed by: INTERNAL MEDICINE

## 2024-09-06 RX ORDER — DICLOFENAC SODIUM 10 MG/G
2 GEL TOPICAL DAILY
Status: COMPLETED | OUTPATIENT
Start: 2024-09-06 | End: 2024-09-08

## 2024-09-06 RX ADMIN — ALLOPURINOL 50 MG: 300 TABLET ORAL at 09:09

## 2024-09-06 RX ADMIN — Medication 10 ML: at 12:09

## 2024-09-06 RX ADMIN — PIPERACILLIN SODIUM AND TAZOBACTAM SODIUM 4.5 G: 4; .5 INJECTION, POWDER, FOR SOLUTION INTRAVENOUS at 02:09

## 2024-09-06 RX ADMIN — Medication 10 ML: at 06:09

## 2024-09-06 RX ADMIN — PIPERACILLIN SODIUM AND TAZOBACTAM SODIUM 4.5 G: 4; .5 INJECTION, POWDER, FOR SOLUTION INTRAVENOUS at 11:09

## 2024-09-06 RX ADMIN — Medication 10 ML: at 05:09

## 2024-09-06 RX ADMIN — Medication 1 CAPSULE: at 09:09

## 2024-09-06 RX ADMIN — TAMSULOSIN HYDROCHLORIDE 0.4 MG: 0.4 CAPSULE ORAL at 09:09

## 2024-09-06 RX ADMIN — FINASTERIDE 5 MG: 5 TABLET, FILM COATED ORAL at 09:09

## 2024-09-06 RX ADMIN — FLUDROCORTISONE ACETATE 100 MCG: 0.1 TABLET ORAL at 06:09

## 2024-09-06 RX ADMIN — PIPERACILLIN SODIUM AND TAZOBACTAM SODIUM 4.5 G: 4; .5 INJECTION, POWDER, FOR SOLUTION INTRAVENOUS at 07:09

## 2024-09-06 RX ADMIN — HEPARIN SODIUM 5000 UNITS: 5000 INJECTION INTRAVENOUS; SUBCUTANEOUS at 10:09

## 2024-09-06 RX ADMIN — HEPARIN SODIUM 5000 UNITS: 5000 INJECTION INTRAVENOUS; SUBCUTANEOUS at 05:09

## 2024-09-06 RX ADMIN — Medication 10 ML: at 11:09

## 2024-09-06 RX ADMIN — DICLOFENAC SODIUM 2 G: 10 GEL TOPICAL at 04:09

## 2024-09-06 RX ADMIN — HEPARIN SODIUM 5000 UNITS: 5000 INJECTION INTRAVENOUS; SUBCUTANEOUS at 02:09

## 2024-09-06 NOTE — PT/OT/SLP PROGRESS
"Occupational Therapy   Treatment    Name: Josh Pinto  MRN: [id number]  Admitting Diagnosis:  Cholangitis  7 Days Post-Op    Recommendations:     Discharge Recommendations: Low Intensity Therapy  Discharge Equipment Recommendations:  none  Barriers to discharge:  None    Assessment:     Josh Pinto is a 84 y.o. male with a medical diagnosis of Cholangitis.  He presents with the following performance deficits affecting function are impaired endurance, weakness, impaired self care skills, impaired functional mobility, impaired balance, decreased safety awareness. Pt limited by mild unsteadiness. However, pt did extremely well with functional transfer training and static standing balance. Patient continues to demonstrate the need for low intensity therapy on a scheduled basis exhibited by decreased independence with self-care and functional mobility           Rehab Prognosis:  Good; patient would benefit from acute skilled OT services to address these deficits and reach maximum level of function.       Plan:     Patient to be seen 3 x/week to address the above listed problems via self-care/home management, therapeutic activities, therapeutic exercises, neuromuscular re-education  Plan of Care Expires: 10/03/24  Plan of Care Reviewed with: patient    Subjective     Chief Complaint: "I haven't showered since I've been here"   Patient/Family Comments/goals: "I want to take a shower"  Pain/Comfort:  Pain Rating 1: 0/10    Objective:     Communicated with: nurse Ramsey prior to session.  Patient found HOB elevated with peripheral IV upon OT entry to room.    General Precautions: Standard, fall, contact    Orthopedic Precautions:N/A  Braces: N/A  Respiratory Status: Room air     Occupational Performance:     Bed Mobility:    Patient completed Scooting/Bridging with supervision  Patient completed Supine to Sit with supervision     Functional Mobility/Transfers:  Patient completed Sit <> Stand Transfer with stand by " assistance  with  no assistive device   Patient completed Chair Transfer using Step Transfer technique with stand by assistance with no assistive device  Patient completed Toilet Transfer Step Transfer technique with stand by assistance with  no AD and grab bars  Functional Mobility: to/from bathroom 10 ft + 10 ft SBA with no AD     Activities of Daily Living:  Grooming: stand by assistance for oral, face, and hand hygiene standing sink side, x 5 minutes to work on static standing balance  Lower body dressing: max(A) for simulated donning of sock, via figure 4         [facility] 6 Click ADL: 20    Treatment & Education:  Pt edu on goals, progress and figure 4 technique. Pt edu on importance of OOB activities and fall prevention strategies.   Addressed all pt questions/concerns with in the [facility] scope of practice.        Patient left up in chair with all lines intact, call button in reach, and nurse notified    GOALS:   Multidisciplinary Problems       Occupational Therapy Goals          Problem: Occupational Therapy    Goal Priority Disciplines Outcome Interventions   Occupational Therapy Goal     OT, PT/OT Progressing    Description: Goals to be met by: 10/3/24     Patient will increase functional independence with ADLs by performing:    UE Dressing with Modified [place].  LE Dressing with Modified [place].  Grooming while standing at sink with Modified [place].  Toileting from toilet with Modified [place] for hygiene and clothing management.   Toilet transfer to toilet with Modified [place].                         Time Tracking:     OT Date of Treatment: 09/06/24  OT Start Time: 1415  OT Stop Time: 1433  OT Total Time (min): 18 min    Billable Minutes:Self Care/Home Management 18    OT/SHUBHAM: SHUBHAM     Number of SHUBHAM visits since last OT visit: 1    9/6/2024

## 2024-09-06 NOTE — PLAN OF CARE
Problem: Adult Inpatient Plan of Care  Goal: Plan of Care Review  Outcome: Progressing  Goal: Patient-Specific Goal (Individualized)  Outcome: Progressing  Goal: Absence of Hospital-Acquired Illness or Injury  Outcome: Progressing  Goal: Optimal Comfort and Wellbeing  Outcome: Progressing  Goal: Readiness for Transition of Care  Outcome: Progressing     Problem: Fall Injury Risk  Goal: Absence of Fall and Fall-Related Injury  Outcome: Progressing     Problem: Skin Injury Risk Increased  Goal: Skin Health and Integrity  Outcome: Progressing     Problem: Infection  Goal: Absence of Infection Signs and Symptoms  Outcome: Progressing   PT A&Ox4. VSS. Afebrile. Meds given per MAR. Pt not voicing any unmet needs or concerns at this time. Safety measures in place bed locked and lowered, call light within reach.

## 2024-09-06 NOTE — HPI
"Mr. Pinto is an 84 year old male with PMH notable for BPH with urinary obstruction resulting in multiple UTIs, SBP s/p hernia repair, HTN, CKD, gout, AF (not on A/C), prior Cdiff, and RCC s/p nephrectomy who presented to [facility] ED on 08/28 with dyspnea, nausea, vomiting, poor PO intake, and fatigue. Per chart review, EMS notes initial oxygen saturation in the 50s therefore he was placed on 15L NRB, however, weaned quickly to NC in th ED. Initial work up with elevated lactic, lipase, and transaminases. Imaging studies performed. Abdominal ultrasound noted gallstones with findings concerning for acute cholecystitis.  "There are numerous mobile gallstones.  Gallbladder wall is mildly thickened measuring 4 mm.  No pericholecystic fluid is present.  Common duct is dilated measuring 14 mm.  Moderate intrahepatic biliary ductal dilation." CT abdomen and pelvis without contrast noted intra and extrahepatic biliary ductal dilatation of uncertain etiology  Massive enlargement of the prostate similar to previous study.  Previous left nephrectomy.  Stable rim calcified structure at the midpole of the right kidney.  There are distal colonic diverticula.  No evidence of acute diverticulitis.  No bowel obstruction no inflammation bowel.  Appendix is present.  No ascites."      In the ED he as given IV steroids, nebulizer treatments, IVF and Zosyn. His lactic acid peaked at 9.9. Given this, [place] [facility] [place] [facility] for AES consultation.  "

## 2024-09-06 NOTE — ASSESSMENT & PLAN NOTE
-Patient with Paroxysmal (<7 days) atrial fibrillation which is controlled currently.   [id number] Score: 2.   -Patient states he was told he does not need AC due to Afib episode provoked by surgery. Continue prophylactic heparin dosing  -holding home metoprolol with bradycardia

## 2024-09-06 NOTE — PLAN OF CARE
Pt unable to do home Iv abx. SNF referrals made to in network facilities:  [facility] - reviewing.   [facility] - reviewing  [facility] - reviewing  [facility]     Will continue to follow and see if any acceptance. Discharge Plan A and Plan B have been determined by review of patient's clinical status, future medical and therapeutic needs, and coverage/benefits for post-acute care in coordination with multidisciplinary team members.     Edward Lee, [facility] [place]    [facility]  [phone]

## 2024-09-06 NOTE — PLAN OF CARE
Problem: Physical Therapy  Goal: Physical Therapy Goal  Description: Goals to be met by:      Patient will increase functional independence with mobility by performin. Supine to sit with Modified [place]  2. Sit to supine with Modified [place]  3. Sit to stand transfer with Modified [place]  4. Gait  x 200 feet with Modified [place] using LRAD or no AD.     Outcome: Progressing   Evaluation completed, initiated plan of care.   Saumya Richard, PT  2024

## 2024-09-06 NOTE — ASSESSMENT & PLAN NOTE
Abdominal ultrasound noted gallstones with findings concerning for acute cholecystitis.  There are numerous mobile gallstones.  Gallbladder wall is mildly thickened measuring 4 mm.  No pericholecystic fluid is present.  Common duct is dilated measuring 14 mm.  Moderate intrahepatic biliary ductal dilation.     -CT abdomen and pelvis without contrast noted intra and extrahepatic biliary ductal dilatation of uncertain etiology.  Follow-up MRCP would be helpful.  Suspect the presence of gallstones.  Massive enlargement of the prostate similar to previous study.  Previous left nephrectomy.  Stable rim calcified structure at the midpole of the right kidney.  There are distal colonic diverticula.     -- AES consulted. Appreciate assistance 8/29 ERCP (see separate note in procedures tab) with removal of CBD stones and placement of temporary CBD stent in setting of presumed cholangitis; numerous GB stones also seen during cholangiogram.  General surgery consulted.  Patient taken for laparoscopic cholecystectomy on 08/30.  Tolerated this well.   -- continue antibiotics per ID   -- Trending LFTs and renal function

## 2024-09-06 NOTE — SUBJECTIVE & OBJECTIVE
Interval History:  Patient complains of difficulty urinating, urinary hesitancy this morning.  States he has issues with this periodically.  He does take Flomax.  He then had an episode where he needed to move quickly to get to the bathroom in time for a BM.  Symptoms improved with coffee.      Review of Systems  A comprehensive review of systems was negative except as noted above.     Objective:     Vital Signs (Most Recent):  Temp: 97.8 °F (36.6 °C) (09/11/24 1133)  Pulse: 67 (09/11/24 1133)  Resp: 19 (09/11/24 1133)  BP: 126/66 (09/11/24 1133)  SpO2: (!) 92 % (09/11/24 1133) Vital Signs (24h Range):  Temp:  [97.8 °F (36.6 °C)-98.7 °F (37.1 °C)] 97.8 °F (36.6 °C)  Pulse:  [] 67  Resp:  [17-19] 19  SpO2:  [92 %-95 %] 92 %  BP: (124-141)/(60-86) 126/66     Weight: 68 kg (149 lb 14.6 oz)  Body mass index is 22.14 kg/m².    Intake/Output Summary (Last 24 hours) at 9/11/2024 1346  Last data filed at 9/11/2024 0000  Gross per 24 hour   Intake --   Output 400 ml   Net -400 ml         Physical Exam  Constitutional:       General: He is not in acute distress.     Appearance: Normal appearance.   HENT:      Head: Normocephalic and atraumatic.   Cardiovascular:      Rate and Rhythm: Normal rate and regular rhythm.   Pulmonary:      Effort: Pulmonary effort is normal. No respiratory distress.   Abdominal:      General: Abdomen is flat. There is no distension.      Palpations: Abdomen is soft.      Tenderness: There is no abdominal tenderness.      Comments: Well healed midline incision  Well healed robotic incisions   Musculoskeletal: Chronic deformities of both knees  Right knee is not warm or erythematous  Neurological:      General: No focal deficit present.      Mental Status: He is alert and oriented to person, place, and time.   Psychiatric:         Behavior: Behavior normal.         Thought Content: Thought content normal.         Significant Labs: All pertinent labs within the past 24 hours have been  reviewed.  BMP:   Recent Labs   Lab 09/10/24  0220   GLU 79      K 3.9   *   CO2 16*   BUN 26*   CREATININE 2.8*   CALCIUM 8.7       Significant Imaging: I have reviewed all pertinent imaging results/findings within the past 24 hours.

## 2024-09-06 NOTE — PROGRESS NOTES
"[facility] - Telemetry [facility] Medicine  Progress Note    Patient Name: Josh Pinto  MRN: [id number]  Patient Class: IP- Inpatient   Admission Date: 8/28/2024  Length of Stay: 9 days  Attending Physician: Chula Stokes MD  Primary Care Provider: Hali Burrell MD        Subjective:     Principal Problem:Cholangitis        HPI:  Mr. Pinto is an 84 year old male with PMH notable for BPH with urinary obstruction resulting in multiple UTIs, SBP s/p hernia repair, HTN, CKD, gout, AF (not on A/C), prior Cdiff, and RCC s/p nephrectomy who presented to [facility] ED on 08/28 with dyspnea, nausea, vomiting, poor PO intake, and fatigue. Per chart review, EMS notes initial oxygen saturation in the 50s therefore he was placed on 15L NRB, however, weaned quickly to NC in th ED. Initial work up with elevated lactic, lipase, and transaminases. Imaging studies performed. Abdominal ultrasound noted gallstones with findings concerning for acute cholecystitis.  "There are numerous mobile gallstones.  Gallbladder wall is mildly thickened measuring 4 mm.  No pericholecystic fluid is present.  Common duct is dilated measuring 14 mm.  Moderate intrahepatic biliary ductal dilation." CT abdomen and pelvis without contrast noted intra and extrahepatic biliary ductal dilatation of uncertain etiology  Massive enlargement of the prostate similar to previous study.  Previous left nephrectomy.  Stable rim calcified structure at the midpole of the right kidney.  There are distal colonic diverticula.  No evidence of acute diverticulitis.  No bowel obstruction no inflammation bowel.  Appendix is present.  No ascites."      In the ED he as given IV steroids, nebulizer treatments, IVF and Zosyn. His lactic acid peaked at 9.9. Given this, [place] [facility] [place] [facility] [place] AES consultation.    Overview/Hospital Course:  8/29 ERCP (see separate note in procedures tab) with removal of CBD stones and placement of " temporary CBD stent in setting of presumed cholangitis; numerous GB stones also seen during cholangiogram.  General surgery consulted.  Patient taken for laparoscopic cholecystectomy on 08/30.  Tolerated this well.  ID consulted for antibiotic recommendations.  9/04 OPAT recs are in. Is ready for dc, but he doesn't feel comfortable with home infusion, will explore options. Asked ID if there is a once per day option to be given at infusion center, but there are no options for that.     Interval History:  Patient complains of some right knee pain which he states onset yesterday.  He states he believes this is a gout flare, though knee is not warm or erythematous. Otherwise feeling ok. Remains afebrile.     Review of Systems  A comprehensive review of systems was negative except as noted above.     Objective:     Vital Signs (Most Recent):  Temp: 97.7 °F (36.5 °C) (09/06/24 1126)  Pulse: 76 (09/06/24 1126)  Resp: 19 (09/06/24 1126)  BP: 130/61 (09/06/24 1126)  SpO2: 95 % (09/06/24 1126) Vital Signs (24h Range):  Temp:  [97.4 °F (36.3 °C)-98.3 °F (36.8 °C)] 97.7 °F (36.5 °C)  Pulse:  [52-77] 76  Resp:  [18-20] 19  SpO2:  [93 %-98 %] 95 %  BP: (116-137)/(57-79) 130/61     Weight: 68 kg (149 lb 14.6 oz)  Body mass index is 22.14 kg/m².    Intake/Output Summary (Last 24 hours) at 9/6/2024 1332  Last data filed at 9/6/2024 0912  Gross per 24 hour   Intake --   Output 725 ml   Net -725 ml         Physical Exam  Constitutional:       General: He is not in acute distress.     Appearance: Normal appearance.   HENT:      Head: Normocephalic and atraumatic.   Cardiovascular:      Rate and Rhythm: Normal rate and regular rhythm.   Pulmonary:      Effort: Pulmonary effort is normal. No respiratory distress.   Abdominal:      General: Abdomen is flat. There is no distension.      Palpations: Abdomen is soft.      Tenderness: There is no abdominal tenderness.      Comments: Well healed midline incision  Well healed robotic incisions    Musculoskeletal: Chronic deformities of both knees  Right knee is not warm or erythematous  Neurological:      General: No focal deficit present.      Mental Status: He is alert and oriented to person, place, and time.   Psychiatric:         Behavior: Behavior normal.         Thought Content: Thought content normal.         Significant Labs: All pertinent labs within the past 24 hours have been reviewed.  BMP:   Recent Labs   Lab 09/05/24  0359 09/06/24  0826   GLU 86 83    138   K 4.2 3.8   * 111*   CO2 19* 20*   BUN 34* 31*   CREATININE 2.7* 2.7*   CALCIUM 8.4* 8.8   MG 1.9  --        Significant Imaging: I have reviewed all pertinent imaging results/findings within the past 24 hours.    Assessment/Plan:      * Cholangitis  Abdominal ultrasound noted gallstones with findings concerning for acute cholecystitis.  There are numerous mobile gallstones.  Gallbladder wall is mildly thickened measuring 4 mm.  No pericholecystic fluid is present.  Common duct is dilated measuring 14 mm.  Moderate intrahepatic biliary ductal dilation.     -CT abdomen and pelvis without contrast noted intra and extrahepatic biliary ductal dilatation of uncertain etiology.  Follow-up MRCP would be helpful.  Suspect the presence of gallstones.  Massive enlargement of the prostate similar to previous study.  Previous left nephrectomy.  Stable rim calcified structure at the midpole of the right kidney.  There are distal colonic diverticula.  No evidence of acute diverticulitis.  No bowel obstruction no inflammation bowel.     -- AES consulted. Appreciate assistance 8/29 ERCP (see separate note in procedures tab) with removal of CBD stones and placement of temporary CBD stent in setting of presumed cholangitis; numerous GB stones also seen during cholangiogram.  General surgery consulted.  Patient taken for laparoscopic cholecystectomy on 08/30.  Tolerated this well.   -- continue antibiotics per ID   -- Trending LFTs and renal  function     Acute hypoxemic respiratory failure  -improved with treatment    Bacteremia  -Blood cultures positive for Klebsiella oxytocia.  Rapid ID also positive for Pseudomonas aeruginosa, though this was not grown in culture.  Repeat blood cultures negative.  -Plan is to dc home on Zosyn, but he is uncomfortable with home infusion. ID recommends IV Zosyn through 09/12/24   -Dicussed with ID, no other abx options.  -Placement?      PAF (paroxysmal atrial fibrillation), [id number] score 3, decline OAC  -Patient with Paroxysmal (<7 days) atrial fibrillation which is controlled currently.   [id number] Score: 2.   -Patient does not appear to be on home anticoagulation.  We will clarify this.  Continue prophylactic heparin dosing for now  -holding home metoprolol with bradycardia    Chronic gout of multiple sites  -Patient complains of right knee pain. Believes he is having gout flare though Knee does not appear inflamed  -continue home allopurinol.  Given 1 dose of renally dosed colchicine      HTN (hypertension), dx 1985  -Chronic, controlled. Latest blood pressure and vitals reviewed-     Temp:  [97.3 °F (36.3 °C)-98.3 °F (36.8 °C)]   Pulse:  [51-75]   Resp:  [16-19]   BP: (116-152)/(61-80)   SpO2:  [93 %-98 %] .   Home meds for hypertension were reviewed and noted below.   Hypertension Medications               metoprolol tartrate (LOPRESSOR) 25 MG tablet Take 12.5 mg by mouth 2 (two) times daily.       While in the hospital, will manage blood pressure as follows; holding Lopressor for bradycardia    Stage 4 chronic kidney disease  Baseline Cr: 2.8-3  Cr at admission to ICU: 3   Estimated Creatinine Clearance: 18.3 mL/min (A) (based on SCr of 3 mg/dL (H)).     -- CMP daily and trend   -- Avoid nephrotoxins   -- Strict I&O   -- Renally dose all medications to appropriate GFR / CrCl   -- MAP > 65 and hgb > 7 goals     BPH with urinary obstruction  Continue home Flomax        VTE Risk Mitigation (From admission,  onward)           Ordered     heparin (porcine) injection 5,000 Units  Every 8 hours         08/28/24 2339     IP VTE HIGH RISK PATIENT  Once         08/28/24 2339     Place sequential compression device  Until discontinued         08/28/24 2339                    Discharge Planning   RAMÓN: 9/9/2024     Code Status: Full Code   Is the patient medically ready for discharge?:     Reason for patient still in hospital (select all that apply): Pending disposition  Discharge Plan A: Skilled Nursing Facility   Discharge Delays: None known at this time        Continue inpatient. Patient requires IV Zosyn through 09/12/24 - he is refusing home IV therapy. We are working with CM on options, may have to remain inpatient for duration of therapy        Chula Stokes MD  Department of Hospital Medicine  [facility]  09/06/2024

## 2024-09-06 NOTE — ASSESSMENT & PLAN NOTE
-Patient complains of right knee pain. Believes he is having gout flare though Knee does not appear inflamed  -continue home allopurinol.  Given 1 dose of renally dosed colchicine  - Improved symptoms

## 2024-09-06 NOTE — ASSESSMENT & PLAN NOTE
-Blood cultures positive for Klebsiella oxytocia.  Rapid ID also positive for Pseudomonas aeruginosa, though this was not grown in culture.  Repeat blood cultures negative.  -Plan was to dc home on Zosyn, but he is uncomfortable with home infusion. ID recommends IV Zosyn through 09/12/24   -Dicussed with ID, no other abx options.

## 2024-09-06 NOTE — PT/OT/SLP EVAL
"Physical Therapy Evaluation    Patient Name:  Josh Pinto   MRN:  [id number]    Recommendations:     Discharge Recommendations: Low Intensity Therapy   Discharge Equipment Recommendations: none   Barriers to discharge: None    Assessment:     Josh Pinto is a 84 y.o. male admitted with a medical diagnosis of Cholangitis.  He presents with the following impairments/functional limitations: weakness, impaired endurance, impaired self care skills, impaired functional mobility, pain, decreased ROM, decreased lower extremity function. The patient report R knee pain due to gout flare up. He ambulated 30' with no AD and stand by assistance, antalgic gait.     Rehab Prognosis: Good; patient would benefit from acute skilled PT services to address these deficits and reach maximum level of function.    Recent Surgery: Procedure(s) (LRB):  CHOLECYSTECTOMY, LAPAROSCOPIC (N/A) 7 Days Post-Op    Plan:     During this hospitalization, patient to be seen 3 x/week to address the identified rehab impairments via gait training, therapeutic exercises, therapeutic activities and progress toward the following goals:    Plan of Care Expires:  10/06/24    Subjective     Chief Complaint: "I'm doing ok aside from my gout"  Patient/Family Comments/goals: "I don't need that" referring to RW  Pain/Comfort:  Pain Rating 1: 3/10  Location - Side 1: Right  Location - Orientation 1: generalized  Location 1: knee  Pain Addressed 1: Reposition, Distraction  Pain Rating Post-Intervention 1: 3/10    Patients cultural, spiritual, Congregational conflicts given the current situation: no    Living Environment:  The patient lives alone in a [facility] built in bench. GB. T/S GB. Retired- [employer].   Prior to admission, patients level of function was independent .  Equipment used at home: bath bench, grab bar.  DME owned (not currently used): none.  Upon discharge, patient will have limited assist on discharge.    Objective:     Communicated with RN prior " to session.  Patient found up in chair with peripheral IV, telemetry  upon PT entry to room.    General Precautions: Standard, fall, contact  Orthopedic Precautions:N/A   Braces: N/A  Respiratory Status: room ait    Exams:    Cognitive Exam  Patient is A&O x4 and follows 100% of one -step commands    Fine Motor Coordination   -       WNL     Postural Exam Patient presented with the following abnormalities:    -       Rounded shoulders  -       Forward head  -       Kyphosis  -       Posterior pelvic tilt  -         Sensation    -       Light touch intact JARROD LE   Skin Integrity/Edema     -       Skin integrity: visibly intact  -       Edema: mild lower legs   R LE ROM WFL   R LE Strength 3/5 hip flexion,NA due to pain 3-/5 knee ext/flex, and 4-/5 ankle DF/PF   L LE ROM WFL   L LE Strength  3+/5 hip flexion, 4-/5 knee ext/flex, and ankle DF/PF            Functional Mobility:    Bed Mobility  Deferred up in chair   Transfers Sit to Stand:  supervision assistance    Gait  Gait Distance: 30 ft with no AD  Assistance Level: stand by assistance   Description: antalgic gait due to R knee pain, R knee flexed in stance, decreased weight shift to R LE in stance, short shuffling steps, kyphotic posture, gait tolerance limited by knee discomfort          AM-PAC 6 CLICK MOBILITY  Total Score:22       Treatment & Education:  Patient educated on:  -role of therapy  -goals of session  -PT POC  -benefits of out of bed mobility and consequences of immobility  -calling for staff assist to mobilize safely  Patient agreeable to mobilize with therapy.      Patient encouraged to ambulate, sit up in chair 3x/day to prevent deconditioning during hospitalization. Patient verbalized understanding and agreement to mobilize only with RN assist for safety.     Patient left up in chair with all lines intact and call button in reach.    GOALS:   Multidisciplinary Problems       Physical Therapy Goals          Problem: Physical Therapy    Goal  Priority Disciplines Outcome Goal Variances Interventions   Physical Therapy Goal     PT, PT/OT Progressing     Description: Goals to be met by:      Patient will increase functional independence with mobility by performin. Supine to sit with Modified [place]  2. Sit to supine with Modified [place]  3. Sit to stand transfer with Modified [place]  4. Gait  x 200 feet with Modified [place] using LRAD or no AD.                          History:     Past Medical History:   Diagnosis Date    Allergy     Arthritis     Cataract     Hypertension        Past Surgical History:   Procedure Laterality Date    BLADDER FULGURATION N/A 2023    Procedure: FULGURATION, BLADDER;  Surgeon: Abdulaziz Garcia MD;  [facility];  Service: Urology;  Laterality: N/A;    CYSTOSCOPY N/A 2023    Procedure: CYSTOSCOPY;  Surgeon: Abdulaziz Garcia MD;  [facility];  Service: Urology;  Laterality: N/A;    ERCP N/A 2024    Procedure: ERCP (ENDOSCOPIC RETROGRADE CHOLANGIOPANCREATOGRAPHY);  Surgeon: Truman Vargas MD;  Location: [facility] (2ND FLR);  Service: Endoscopy;  Laterality: N/A;    ESOPHAGOGASTRODUODENOSCOPY N/A 2023    Procedure: EGD (ESOPHAGOGASTRODUODENOSCOPY);  Surgeon: Varinder Melo MD;  Location: [facility];  Service: Gastroenterology;  Laterality: N/A;    HERNIA REPAIR N/A 9/3/2023    Procedure: REPAIR, HERNIA INTERNAL;  Surgeon: Andrew Juares MD;  [facility];  Service: General;  Laterality: N/A;    INSERTION OF CATHETER N/A 2023    Procedure: INSERTION, CATHETER;  Surgeon: Abdulaziz Garcia MD;  [facility];  Service: Urology;  Laterality: N/A;    knee surgery      right knee    LAPAROSCOPIC CHOLECYSTECTOMY N/A 2024    Procedure: CHOLECYSTECTOMY, LAPAROSCOPIC;  Surgeon: Jenae Rasmussen MD;  Location: [facility] 2ND FLR;  Service: General;  Laterality: N/A;    LAPAROSCOPIC ROBOT-ASSISTED SURGICAL REMOVAL OF KIDNEY USING DA EVIN  XI Left 8/20/2023    Procedure: XI ROBOTIC NEPHRECTOMY;  Surgeon: Abdulaziz Garcia MD;  Location: [facility] OR;  Service: Urology;  Laterality: Left;    LAPAROTOMY, EXPLORATORY N/A 9/3/2023    Procedure: LAPAROTOMY, EXPLORATORY;  Surgeon: Andrew Juares MD;  Location: [facility] OR;  Service: General;  Laterality: N/A;       Time Tracking:     PT Received On: 09/06/24  PT Start Time: 1040     PT Stop Time: 1050  PT Total Time (min): 10 min     Billable Minutes: Evaluation 10      09/06/2024

## 2024-09-07 PROCEDURE — 94761 N-INVAS EAR/PLS OXIMETRY MLT: CPT

## 2024-09-07 PROCEDURE — 27000207 HC ISOLATION

## 2024-09-07 PROCEDURE — 63600175 PHARM REV CODE 636 W HCPCS: Performed by: INTERNAL MEDICINE

## 2024-09-07 PROCEDURE — 21400001 HC TELEMETRY ROOM

## 2024-09-07 PROCEDURE — A4216 STERILE WATER/SALINE, 10 ML: HCPCS | Performed by: INTERNAL MEDICINE

## 2024-09-07 PROCEDURE — 25000003 PHARM REV CODE 250: Performed by: INTERNAL MEDICINE

## 2024-09-07 PROCEDURE — 63600175 PHARM REV CODE 636 W HCPCS

## 2024-09-07 PROCEDURE — 25000003 PHARM REV CODE 250: Performed by: HOSPITALIST

## 2024-09-07 PROCEDURE — 25000003 PHARM REV CODE 250: Performed by: NURSE PRACTITIONER

## 2024-09-07 PROCEDURE — 94799 UNLISTED PULMONARY SVC/PX: CPT

## 2024-09-07 RX ORDER — HYDRALAZINE HYDROCHLORIDE 20 MG/ML
10 INJECTION INTRAMUSCULAR; INTRAVENOUS EVERY 6 HOURS PRN
Status: DISCONTINUED | OUTPATIENT
Start: 2024-09-07 | End: 2024-09-12 | Stop reason: HOSPADM

## 2024-09-07 RX ORDER — ACETAMINOPHEN 325 MG/1
650 TABLET ORAL EVERY 6 HOURS PRN
Status: DISCONTINUED | OUTPATIENT
Start: 2024-09-07 | End: 2024-09-12 | Stop reason: HOSPADM

## 2024-09-07 RX ORDER — POLYETHYLENE GLYCOL 3350 17 G/17G
17 POWDER, FOR SOLUTION ORAL DAILY PRN
Status: DISCONTINUED | OUTPATIENT
Start: 2024-09-07 | End: 2024-09-12 | Stop reason: HOSPADM

## 2024-09-07 RX ADMIN — PIPERACILLIN SODIUM AND TAZOBACTAM SODIUM 4.5 G: 4; .5 INJECTION, POWDER, FOR SOLUTION INTRAVENOUS at 03:09

## 2024-09-07 RX ADMIN — Medication 10 ML: at 12:09

## 2024-09-07 RX ADMIN — FLUDROCORTISONE ACETATE 100 MCG: 0.1 TABLET ORAL at 07:09

## 2024-09-07 RX ADMIN — Medication 10 ML: at 11:09

## 2024-09-07 RX ADMIN — Medication 10 ML: at 06:09

## 2024-09-07 RX ADMIN — Medication 1 CAPSULE: at 08:09

## 2024-09-07 RX ADMIN — PIPERACILLIN SODIUM AND TAZOBACTAM SODIUM 4.5 G: 4; .5 INJECTION, POWDER, FOR SOLUTION INTRAVENOUS at 11:09

## 2024-09-07 RX ADMIN — PIPERACILLIN SODIUM AND TAZOBACTAM SODIUM 4.5 G: 4; .5 INJECTION, POWDER, FOR SOLUTION INTRAVENOUS at 07:09

## 2024-09-07 RX ADMIN — DICLOFENAC SODIUM 2 G: 10 GEL TOPICAL at 08:09

## 2024-09-07 RX ADMIN — TAMSULOSIN HYDROCHLORIDE 0.4 MG: 0.4 CAPSULE ORAL at 08:09

## 2024-09-07 RX ADMIN — HEPARIN SODIUM 5000 UNITS: 5000 INJECTION INTRAVENOUS; SUBCUTANEOUS at 10:09

## 2024-09-07 RX ADMIN — HEPARIN SODIUM 5000 UNITS: 5000 INJECTION INTRAVENOUS; SUBCUTANEOUS at 02:09

## 2024-09-07 RX ADMIN — HEPARIN SODIUM 5000 UNITS: 5000 INJECTION INTRAVENOUS; SUBCUTANEOUS at 06:09

## 2024-09-07 RX ADMIN — FINASTERIDE 5 MG: 5 TABLET, FILM COATED ORAL at 08:09

## 2024-09-07 NOTE — ASSESSMENT & PLAN NOTE
-Chronic, controlled. Latest blood pressure and vitals reviewed-     Temp:  [97.5 °F (36.4 °C)-98.8 °F (37.1 °C)]   Pulse:  [53-81]   Resp:  [18-20]   BP: (114-139)/(63-78)   SpO2:  [91 %-95 %] .   Home meds for hypertension were reviewed and noted below.   Hypertension Medications               metoprolol tartrate (LOPRESSOR) 25 MG tablet Take 12.5 mg by mouth 2 (two) times daily.       While in the hospital, will manage blood pressure as follows; holding Lopressor for bradycardia

## 2024-09-07 NOTE — PLAN OF CARE
Problem: Adult Inpatient Plan of Care  Goal: Readiness for Transition of Care  Outcome: Progressing     Problem: Adult Inpatient Plan of Care  Goal: Optimal Comfort and Wellbeing  Outcome: Progressing     Problem: Adult Inpatient Plan of Care  Goal: Absence of Hospital-Acquired Illness or Injury  Outcome: Progressing

## 2024-09-07 NOTE — PLAN OF CARE
Problem: Adult Inpatient Plan of Care  Goal: Plan of Care Review  Outcome: Progressing  Goal: Patient-Specific Goal (Individualized)  Outcome: Progressing  Goal: Absence of Hospital-Acquired Illness or Injury  Outcome: Progressing  Goal: Optimal Comfort and Wellbeing  Outcome: Progressing  Goal: Readiness for Transition of Care  Outcome: Progressing     Problem: Fall Injury Risk  Goal: Absence of Fall and Fall-Related Injury  Outcome: Progressing     Problem: Skin Injury Risk Increased  Goal: Skin Health and Integrity  Outcome: Progressing     Problem: Wound  Goal: Optimal Coping  Outcome: Progressing  Goal: Optimal Functional Ability  Outcome: Progressing  Goal: Absence of Infection Signs and Symptoms  Outcome: Progressing  Goal: Improved Oral Intake  Outcome: Progressing  Goal: Optimal Pain Control and Function  Outcome: Progressing  Goal: Skin Health and Integrity  Outcome: Progressing  Goal: Optimal Wound Healing  Outcome: Progressing     Problem: Infection  Goal: Absence of Infection Signs and Symptoms  Outcome: Progressing     Problem: Fall Injury Risk  Goal: Absence of Fall and Fall-Related Injury  Outcome: Progressing     Problem: Infection  Goal: Absence of Infection Signs and Symptoms  Outcome: Progressing    Patient vitally stable with no ss of distress within the shift. Safety maintained with bed wheels locked, side rails up, call light and frequently needed items kept within reach. Needs attended. Rest promoted. Will continue POC.

## 2024-09-07 NOTE — PROGRESS NOTES
"Robbie Sepulveda - Telemetry [facility] Medicine  Progress Note    Patient Name: Josh Pinto  MRN: [id number]  Patient Class: IP- Inpatient   Admission Date: 8/28/2024  Length of Stay: 10 days  Attending Physician: Chula Stokes MD  Primary Care Provider: Hali Burrell MD        Subjective:     Principal Problem:Cholangitis        HPI:  Mr. Pinto is an 84 year old male with PMH notable for BPH with urinary obstruction resulting in multiple UTIs, SBP s/p hernia repair, HTN, CKD, gout, AF (not on A/C), prior Cdiff, and RCC s/p nephrectomy who presented to [facility] ED on 08/28 with dyspnea, nausea, vomiting, poor PO intake, and fatigue. Per chart review, EMS notes initial oxygen saturation in the 50s therefore he was placed on 15L NRB, however, weaned quickly to NC in th ED. Initial work up with elevated lactic, lipase, and transaminases. Imaging studies performed. Abdominal ultrasound noted gallstones with findings concerning for acute cholecystitis.  "There are numerous mobile gallstones.  Gallbladder wall is mildly thickened measuring 4 mm.  No pericholecystic fluid is present.  Common duct is dilated measuring 14 mm.  Moderate intrahepatic biliary ductal dilation." CT abdomen and pelvis without contrast noted intra and extrahepatic biliary ductal dilatation of uncertain etiology  Massive enlargement of the prostate similar to previous study.  Previous left nephrectomy.  Stable rim calcified structure at the midpole of the right kidney.  There are distal colonic diverticula.  No evidence of acute diverticulitis.  No bowel obstruction no inflammation bowel.  Appendix is present.  No ascites."      In the ED he as given IV steroids, nebulizer treatments, IVF and Zosyn. His lactic acid peaked at 9.9. Given this, transfer was requested to [facility] [place] [facility]bie Sepulveda for AES consultation.    Overview/Hospital Course:  8/29 ERCP (see separate note in procedures tab) with removal of CBD stones and placement of " temporary CBD stent in setting of presumed cholangitis; numerous GB stones also seen during cholangiogram.  General surgery consulted.  Patient taken for laparoscopic cholecystectomy on 08/30.  Tolerated this well.  ID consulted for antibiotic recommendations.  9/04 OPAT recs are in. Is ready for dc, but he doesn't feel comfortable with home infusion, will explore options. Asked ID if there is a once per day option to be given at infusion center, but there are no options for that.     Interval History:  Patient states he is feeling okay today.  Right knee pain improved with diclofenac gel.  Discussed patient's history of atrial fibrillation.  He states he had AFib after his nephrectomy and since it was provoked AFib, he was told he does not need anticoagulation.  I reviewed his cardiology clinic note from June- it appears anticoagulation was recommended but patient declined.    Review of Systems  A comprehensive review of systems was negative except as noted above.     Objective:     Vital Signs (Most Recent):  Temp: 98.5 °F (36.9 °C) (09/07/24 0805)  Pulse: (!) 54 (09/07/24 0805)  Resp: 16 (09/07/24 0805)  BP: 132/64 (09/07/24 0805)  SpO2: (!) 93 % (09/07/24 0805) Vital Signs (24h Range):  Temp:  [98.2 °F (36.8 °C)-98.7 °F (37.1 °C)] 98.5 °F (36.9 °C)  Pulse:  [51-78] 54  Resp:  [16-18] 16  SpO2:  [93 %-95 %] 93 %  BP: (115-132)/(64-71) 132/64     Weight: 68 kg (149 lb 14.6 oz)  Body mass index is 22.14 kg/m².    Intake/Output Summary (Last 24 hours) at 9/7/2024 1143  Last data filed at 9/6/2024 2229  Gross per 24 hour   Intake --   Output 750 ml   Net -750 ml         Physical Exam  Constitutional:       General: He is not in acute distress.     Appearance: Normal appearance.   HENT:      Head: Normocephalic and atraumatic.   Cardiovascular:      Rate and Rhythm: Normal rate and regular rhythm.   Pulmonary:      Effort: Pulmonary effort is normal. No respiratory distress.   Abdominal:      General: Abdomen is flat.  There is no distension.      Palpations: Abdomen is soft.      Tenderness: There is no abdominal tenderness.      Comments: Well healed midline incision  Well healed robotic incisions   Musculoskeletal: Chronic deformities of both knees  Right knee is not warm or erythematous  Neurological:      General: No focal deficit present.      Mental Status: He is alert and oriented to person, place, and time.   Psychiatric:         Behavior: Behavior normal.         Thought Content: Thought content normal.         Significant Labs: All pertinent labs within the past 24 hours have been reviewed.  BMP:   Recent Labs   Lab 09/06/24  0826   GLU 83      K 3.8   *   CO2 20*   BUN 31*   CREATININE 2.7*   CALCIUM 8.8       Significant Imaging: I have reviewed all pertinent imaging results/findings within the past 24 hours.    Assessment/Plan:      * Cholangitis  Abdominal ultrasound noted gallstones with findings concerning for acute cholecystitis.  There are numerous mobile gallstones.  Gallbladder wall is mildly thickened measuring 4 mm.  No pericholecystic fluid is present.  Common duct is dilated measuring 14 mm.  Moderate intrahepatic biliary ductal dilation.     -CT abdomen and pelvis without contrast noted intra and extrahepatic biliary ductal dilatation of uncertain etiology.  Follow-up MRCP would be helpful.  Suspect the presence of gallstones.  Massive enlargement of the prostate similar to previous study.  Previous left nephrectomy.  Stable rim calcified structure at the midpole of the right kidney.  There are distal colonic diverticula.  No evidence of acute diverticulitis.  No bowel obstruction no inflammation bowel.     -- AES consulted. Appreciate assistance 8/29 ERCP (see separate note in procedures tab) with removal of CBD stones and placement of temporary CBD stent in setting of presumed cholangitis; numerous GB stones also seen during cholangiogram.  General surgery consulted.  Patient taken for  laparoscopic cholecystectomy on 08/30.  Tolerated this well.   -- continue antibiotics per ID   -- Trending LFTs and renal function     Acute hypoxemic respiratory failure  -improved with treatment- patient with stable O2 sats on room air    Bacteremia  -Blood cultures positive for Klebsiella oxytocia.  Rapid ID also positive for Pseudomonas aeruginosa, though this was not grown in culture.  Repeat blood cultures negative.  -Plan was to dc home on Zosyn, but he is uncomfortable with home infusion. ID recommends IV Zosyn through 09/12/24   -Dicussed with ID, no other abx options.  -Placement if possible      PAF (paroxysmal atrial fibrillation), [id number] score 3, decline OAC  -Patient with Paroxysmal (<7 days) atrial fibrillation which is controlled currently.   [id number] Score: 2.   -Patient states he was told he does not need AC due to Afib episode provoked by surgery. Continue prophylactic heparin dosing  -holding home metoprolol with bradycardia    Chronic gout of multiple sites  -Patient complains of right knee pain. Believes he is having gout flare though Knee does not appear inflamed  -continue home allopurinol.  Given 1 dose of renally dosed colchicine      HTN (hypertension), dx 1985  -Chronic, controlled. Latest blood pressure and vitals reviewed-     Temp:  [98.2 °F (36.8 °C)-98.7 °F (37.1 °C)]   Pulse:  [51-78]   Resp:  [16-18]   BP: (115-132)/(64-71)   SpO2:  [93 %-95 %] .   Home meds for hypertension were reviewed and noted below.   Hypertension Medications               metoprolol tartrate (LOPRESSOR) 25 MG tablet Take 12.5 mg by mouth 2 (two) times daily.       While in the hospital, will manage blood pressure as follows; holding Lopressor for bradycardia    Stage 4 chronic kidney disease  Baseline Cr: 2.8-3  Cr at admission to ICU: 3   Estimated Creatinine Clearance: 18.3 mL/min (A) (based on SCr of 3 mg/dL (H)).     -- Avoid nephrotoxins   -- Strict I&O   -- Renally dose all medications to  appropriate GFR / CrCl   -- Cr appears to be at baseline.  Continue to monitor    BPH with urinary obstruction  Continue home Flomax        VTE Risk Mitigation (From admission, onward)           Ordered     heparin (porcine) injection 5,000 Units  Every 8 hours         08/28/24 2339     IP VTE HIGH RISK PATIENT  Once         08/28/24 2339     Place sequential compression device  Until discontinued         08/28/24 2339                    Discharge Planning   RAMÓN: 9/12/2024     Code Status: Full Code   Is the patient medically ready for discharge?:     Reason for patient still in hospital (select all that apply): Pending disposition  Discharge Plan A: Skilled Nursing Facility   Discharge Delays: None known at this time        Continue inpatient. Patient requires IV Zosyn through 09/12/24 - he is refusing home IV therapy. We are working with CM on options, may have to remain inpatient for duration of therapy        Chula Stokes MD  Department of Hospital Medicine  [facility]  09/07/2024

## 2024-09-07 NOTE — ASSESSMENT & PLAN NOTE
Baseline Cr: 2.8-3  Cr at admission to ICU: 3   Estimated Creatinine Clearance: 18.3 mL/min (A) (based on SCr of 3 mg/dL (H)).     -- Avoid nephrotoxins   -- Strict I&O   -- Renally dose all medications to appropriate GFR / CrCl   -- Cr appears to be at baseline.  Continue to monitor

## 2024-09-08 LAB
ANION GAP SERPL CALC-SCNC: 12 MMOL/L (ref 8–16)
BUN SERPL-MCNC: 26 MG/DL (ref 8–23)
CALCIUM SERPL-MCNC: 8.7 MG/DL (ref 8.7–10.5)
CHLORIDE SERPL-SCNC: 110 MMOL/L (ref 95–110)
CO2 SERPL-SCNC: 17 MMOL/L (ref 23–29)
CREAT SERPL-MCNC: 2.7 MG/DL (ref 0.5–1.4)
EST. GFR  (NO RACE VARIABLE): 22.8 ML/MIN/1.73 M^2
GLUCOSE SERPL-MCNC: 112 MG/DL (ref 70–110)
POTASSIUM SERPL-SCNC: 3.9 MMOL/L (ref 3.5–5.1)
SODIUM SERPL-SCNC: 139 MMOL/L (ref 136–145)

## 2024-09-08 PROCEDURE — 80048 BASIC METABOLIC PNL TOTAL CA: CPT | Performed by: STUDENT IN AN ORGANIZED HEALTH CARE EDUCATION/TRAINING PROGRAM

## 2024-09-08 PROCEDURE — A4216 STERILE WATER/SALINE, 10 ML: HCPCS | Performed by: INTERNAL MEDICINE

## 2024-09-08 PROCEDURE — 27000207 HC ISOLATION

## 2024-09-08 PROCEDURE — 63600175 PHARM REV CODE 636 W HCPCS

## 2024-09-08 PROCEDURE — 21400001 HC TELEMETRY ROOM

## 2024-09-08 PROCEDURE — 36415 COLL VENOUS BLD VENIPUNCTURE: CPT | Performed by: STUDENT IN AN ORGANIZED HEALTH CARE EDUCATION/TRAINING PROGRAM

## 2024-09-08 PROCEDURE — 25000003 PHARM REV CODE 250: Performed by: INTERNAL MEDICINE

## 2024-09-08 PROCEDURE — 25000003 PHARM REV CODE 250: Performed by: NURSE PRACTITIONER

## 2024-09-08 PROCEDURE — 25000003 PHARM REV CODE 250: Performed by: STUDENT IN AN ORGANIZED HEALTH CARE EDUCATION/TRAINING PROGRAM

## 2024-09-08 PROCEDURE — 25000003 PHARM REV CODE 250: Performed by: HOSPITALIST

## 2024-09-08 PROCEDURE — 63600175 PHARM REV CODE 636 W HCPCS: Performed by: INTERNAL MEDICINE

## 2024-09-08 RX ADMIN — ACETAMINOPHEN 650 MG: 325 TABLET ORAL at 11:09

## 2024-09-08 RX ADMIN — Medication 10 ML: at 06:09

## 2024-09-08 RX ADMIN — PIPERACILLIN SODIUM AND TAZOBACTAM SODIUM 4.5 G: 4; .5 INJECTION, POWDER, FOR SOLUTION INTRAVENOUS at 06:09

## 2024-09-08 RX ADMIN — Medication 10 ML: at 11:09

## 2024-09-08 RX ADMIN — ALLOPURINOL 50 MG: 300 TABLET ORAL at 08:09

## 2024-09-08 RX ADMIN — FINASTERIDE 5 MG: 5 TABLET, FILM COATED ORAL at 08:09

## 2024-09-08 RX ADMIN — Medication 10 ML: at 12:09

## 2024-09-08 RX ADMIN — Medication 1 CAPSULE: at 08:09

## 2024-09-08 RX ADMIN — TAMSULOSIN HYDROCHLORIDE 0.4 MG: 0.4 CAPSULE ORAL at 08:09

## 2024-09-08 RX ADMIN — DICLOFENAC SODIUM 2 G: 10 GEL TOPICAL at 08:09

## 2024-09-08 RX ADMIN — Medication 10 ML: at 05:09

## 2024-09-08 RX ADMIN — HEPARIN SODIUM 5000 UNITS: 5000 INJECTION INTRAVENOUS; SUBCUTANEOUS at 05:09

## 2024-09-08 RX ADMIN — HEPARIN SODIUM 5000 UNITS: 5000 INJECTION INTRAVENOUS; SUBCUTANEOUS at 09:09

## 2024-09-08 RX ADMIN — PIPERACILLIN SODIUM AND TAZOBACTAM SODIUM 4.5 G: 4; .5 INJECTION, POWDER, FOR SOLUTION INTRAVENOUS at 02:09

## 2024-09-08 RX ADMIN — HEPARIN SODIUM 5000 UNITS: 5000 INJECTION INTRAVENOUS; SUBCUTANEOUS at 02:09

## 2024-09-08 RX ADMIN — PIPERACILLIN SODIUM AND TAZOBACTAM SODIUM 4.5 G: 4; .5 INJECTION, POWDER, FOR SOLUTION INTRAVENOUS at 11:09

## 2024-09-08 RX ADMIN — FLUDROCORTISONE ACETATE 100 MCG: 0.1 TABLET ORAL at 08:09

## 2024-09-08 NOTE — PROGRESS NOTES
"Robbie Sepulveda - Telemetry [facility] Medicine  Progress Note    Patient Name: Josh Pinto  MRN: [id number]  Patient Class: IP- Inpatient   Admission Date: 8/28/2024  Length of Stay: 11 days  Attending Physician: Chula Stokes MD  Primary Care Provider: Hali Burrell MD        Subjective:     Principal Problem:Cholangitis        HPI:  Mr. Pinto is an 84 year old male with PMH notable for BPH with urinary obstruction resulting in multiple UTIs, SBP s/p hernia repair, HTN, CKD, gout, AF (not on A/C), prior Cdiff, and RCC s/p nephrectomy who presented to [facility] ED on 08/28 with dyspnea, nausea, vomiting, poor PO intake, and fatigue. Per chart review, EMS notes initial oxygen saturation in the 50s therefore he was placed on 15L NRB, however, weaned quickly to NC in th ED. Initial work up with elevated lactic, lipase, and transaminases. Imaging studies performed. Abdominal ultrasound noted gallstones with findings concerning for acute cholecystitis.  "There are numerous mobile gallstones.  Gallbladder wall is mildly thickened measuring 4 mm.  No pericholecystic fluid is present.  Common duct is dilated measuring 14 mm.  Moderate intrahepatic biliary ductal dilation." CT abdomen and pelvis without contrast noted intra and extrahepatic biliary ductal dilatation of uncertain etiology  Massive enlargement of the prostate similar to previous study.  Previous left nephrectomy.  Stable rim calcified structure at the midpole of the right kidney.  There are distal colonic diverticula.  No evidence of acute diverticulitis.  No bowel obstruction no inflammation bowel.  Appendix is present.  No ascites."      In the ED he as given IV steroids, nebulizer treatments, IVF and Zosyn. His lactic acid peaked at 9.9. Given this, transfer was requested to [facility] [place] [place] Robbie Sepulveda for AES consultation.    Overview/Hospital Course:  8/29 ERCP (see separate note in procedures tab) with removal of CBD stones and placement of " temporary CBD stent in setting of presumed cholangitis; numerous GB stones also seen during cholangiogram.  General surgery consulted.  Patient taken for laparoscopic cholecystectomy on 08/30.  Tolerated this well.  ID consulted for antibiotic recommendations.  9/04 OPAT recs are in. Zosyn 4.5g IV q 12 hours through 9/12. Is ready for dc, but he doesn't feel comfortable with home infusion, will explore options. Asked ID if there is a once per day option to be given at infusion center, but there are no options for that.     Interval History:  Patient states he is feeling okay today.  Right knee pain improved with diclofenac gel. Complains of some soreness in right upper quadrant.  He was worried about taking Tylenol based on previous conversation with his PCP but this should be okay.      Review of Systems  A comprehensive review of systems was negative except as noted above.     Objective:     Vital Signs (Most Recent):  Temp: 97.7 °F (36.5 °C) (09/08/24 1300)  Pulse: 74 (09/08/24 1300)  Resp: 17 (09/08/24 1300)  BP: 130/82 (09/08/24 1300)  SpO2: 95 % (09/08/24 1300) Vital Signs (24h Range):  Temp:  [97.6 °F (36.4 °C)-98.4 °F (36.9 °C)] 97.7 °F (36.5 °C)  Pulse:  [54-81] 74  Resp:  [16-18] 17  SpO2:  [94 %-99 %] 95 %  BP: (115-135)/(71-82) 130/82     Weight: 68 kg (149 lb 14.6 oz)  Body mass index is 22.14 kg/m².    Intake/Output Summary (Last 24 hours) at 9/8/2024 1310  Last data filed at 9/8/2024 1030  Gross per 24 hour   Intake 120 ml   Output 600 ml   Net -480 ml         Physical Exam  Constitutional:       General: He is not in acute distress.     Appearance: Normal appearance.   HENT:      Head: Normocephalic and atraumatic.   Cardiovascular:      Rate and Rhythm: Normal rate and regular rhythm.   Pulmonary:      Effort: Pulmonary effort is normal. No respiratory distress.   Abdominal:      General: Abdomen is flat. There is no distension.      Palpations: Abdomen is soft.      Tenderness: There is no  abdominal tenderness.      Comments: Well healed midline incision  Well healed robotic incisions   Musculoskeletal: Chronic deformities of both knees  Right knee is not warm or erythematous  Neurological:      General: No focal deficit present.      Mental Status: He is alert and oriented to person, place, and time.   Psychiatric:         Behavior: Behavior normal.         Thought Content: Thought content normal.         Significant Labs: All pertinent labs within the past 24 hours have been reviewed.  BMP:   Recent Labs   Lab 09/08/24  0701   *      K 3.9      CO2 17*   BUN 26*   CREATININE 2.7*   CALCIUM 8.7       Significant Imaging: I have reviewed all pertinent imaging results/findings within the past 24 hours.    Assessment/Plan:      * Cholangitis  Abdominal ultrasound noted gallstones with findings concerning for acute cholecystitis.  There are numerous mobile gallstones.  Gallbladder wall is mildly thickened measuring 4 mm.  No pericholecystic fluid is present.  Common duct is dilated measuring 14 mm.  Moderate intrahepatic biliary ductal dilation.     -CT abdomen and pelvis without contrast noted intra and extrahepatic biliary ductal dilatation of uncertain etiology.  Follow-up MRCP would be helpful.  Suspect the presence of gallstones.  Massive enlargement of the prostate similar to previous study.  Previous left nephrectomy.  Stable rim calcified structure at the midpole of the right kidney.  There are distal colonic diverticula.  No evidence of acute diverticulitis.  No bowel obstruction no inflammation bowel.     -- AES consulted. Appreciate assistance 8/29 ERCP (see separate note in procedures tab) with removal of CBD stones and placement of temporary CBD stent in setting of presumed cholangitis; numerous GB stones also seen during cholangiogram.  General surgery consulted.  Patient taken for laparoscopic cholecystectomy on 08/30.  Tolerated this well.   -- continue antibiotics per  ID   -- Trending LFTs and renal function     Acute hypoxemic respiratory failure  -improved with treatment- patient with stable O2 sats on room air    Bacteremia  -Blood cultures positive for Klebsiella oxytocia.  Rapid ID also positive for Pseudomonas aeruginosa, though this was not grown in culture.  Repeat blood cultures negative.  -Plan was to dc home on Zosyn, but he is uncomfortable with home infusion. ID recommends IV Zosyn through 09/12/24   -Dicussed with ID, no other abx options.  -Placement if possible      PAF (paroxysmal atrial fibrillation), [id number] score 3, decline OAC  -Patient with Paroxysmal (<7 days) atrial fibrillation which is controlled currently.   [id number] Score: 2.   -Patient states he was told he does not need AC due to Afib episode provoked by surgery. Continue prophylactic heparin dosing  -holding home metoprolol with bradycardia    Chronic gout of multiple sites  -Patient complains of right knee pain. Believes he is having gout flare though Knee does not appear inflamed  -continue home allopurinol.  Given 1 dose of renally dosed colchicine      HTN (hypertension), dx 1985  -Chronic, controlled. Latest blood pressure and vitals reviewed-     Temp:  [98.2 °F (36.8 °C)-98.7 °F (37.1 °C)]   Pulse:  [51-78]   Resp:  [16-18]   BP: (115-132)/(64-71)   SpO2:  [93 %-95 %] .   Home meds for hypertension were reviewed and noted below.   Hypertension Medications               metoprolol tartrate (LOPRESSOR) 25 MG tablet Take 12.5 mg by mouth 2 (two) times daily.       While in the hospital, will manage blood pressure as follows; holding Lopressor for bradycardia    Stage 4 chronic kidney disease  Baseline Cr: 2.8-3  Cr at admission to ICU: 3   Estimated Creatinine Clearance: 18.3 mL/min (A) (based on SCr of 3 mg/dL (H)).     -- Avoid nephrotoxins   -- Strict I&O   -- Renally dose all medications to appropriate GFR / CrCl   -- Cr appears to be at baseline.  Continue to monitor    BPH with  urinary obstruction  Continue home Flomax        VTE Risk Mitigation (From admission, onward)           Ordered     heparin (porcine) injection 5,000 Units  Every 8 hours         08/28/24 2339     IP VTE HIGH RISK PATIENT  Once         08/28/24 2339     Place sequential compression device  Until discontinued         08/28/24 2339                    Discharge Planning   RAMÓN: 9/12/2024     Code Status: Full Code   Is the patient medically ready for discharge?:     Reason for patient still in hospital (select all that apply): Pending disposition  Discharge Plan A: Skilled Nursing Facility   Discharge Delays: None known at this time        Continue inpatient. Patient requires IV Zosyn through 09/12/24 - he is refusing home IV therapy. We are working with CM on options, may have to remain inpatient for duration of therapy        Chula Stokes MD  Department of Hospital Medicine  [facility]  09/08/2024

## 2024-09-09 PROCEDURE — 63600175 PHARM REV CODE 636 W HCPCS: Performed by: INTERNAL MEDICINE

## 2024-09-09 PROCEDURE — A4216 STERILE WATER/SALINE, 10 ML: HCPCS | Performed by: INTERNAL MEDICINE

## 2024-09-09 PROCEDURE — 63600175 PHARM REV CODE 636 W HCPCS

## 2024-09-09 PROCEDURE — 25000003 PHARM REV CODE 250: Performed by: INTERNAL MEDICINE

## 2024-09-09 PROCEDURE — 25000003 PHARM REV CODE 250: Performed by: NURSE PRACTITIONER

## 2024-09-09 PROCEDURE — 27000207 HC ISOLATION

## 2024-09-09 PROCEDURE — 25000003 PHARM REV CODE 250: Performed by: HOSPITALIST

## 2024-09-09 PROCEDURE — 21400001 HC TELEMETRY ROOM

## 2024-09-09 RX ADMIN — FINASTERIDE 5 MG: 5 TABLET, FILM COATED ORAL at 09:09

## 2024-09-09 RX ADMIN — PIPERACILLIN SODIUM AND TAZOBACTAM SODIUM 4.5 G: 4; .5 INJECTION, POWDER, FOR SOLUTION INTRAVENOUS at 10:09

## 2024-09-09 RX ADMIN — PIPERACILLIN SODIUM AND TAZOBACTAM SODIUM 4.5 G: 4; .5 INJECTION, POWDER, FOR SOLUTION INTRAVENOUS at 02:09

## 2024-09-09 RX ADMIN — FLUDROCORTISONE ACETATE 100 MCG: 0.1 TABLET ORAL at 09:09

## 2024-09-09 RX ADMIN — Medication 1 CAPSULE: at 09:09

## 2024-09-09 RX ADMIN — Medication 10 ML: at 12:09

## 2024-09-09 RX ADMIN — PIPERACILLIN SODIUM AND TAZOBACTAM SODIUM 4.5 G: 4; .5 INJECTION, POWDER, FOR SOLUTION INTRAVENOUS at 08:09

## 2024-09-09 RX ADMIN — TAMSULOSIN HYDROCHLORIDE 0.4 MG: 0.4 CAPSULE ORAL at 09:09

## 2024-09-09 RX ADMIN — HEPARIN SODIUM 5000 UNITS: 5000 INJECTION INTRAVENOUS; SUBCUTANEOUS at 10:09

## 2024-09-09 RX ADMIN — HEPARIN SODIUM 5000 UNITS: 5000 INJECTION INTRAVENOUS; SUBCUTANEOUS at 05:09

## 2024-09-09 RX ADMIN — Medication 10 ML: at 05:09

## 2024-09-09 RX ADMIN — HEPARIN SODIUM 5000 UNITS: 5000 INJECTION INTRAVENOUS; SUBCUTANEOUS at 01:09

## 2024-09-09 NOTE — PLAN OF CARE
Problem: Adult Inpatient Plan of Care  Goal: Plan of Care Review  Outcome: Progressing  Goal: Patient-Specific Goal (Individualized)  Outcome: Progressing  Goal: Absence of Hospital-Acquired Illness or Injury  Outcome: Progressing  Goal: Optimal Comfort and Wellbeing  Outcome: Progressing  Goal: Readiness for Transition of Care  Outcome: Progressing     Problem: Fall Injury Risk  Goal: Absence of Fall and Fall-Related Injury  Outcome: Progressing     Problem: Skin Injury Risk Increased  Goal: Skin Health and Integrity  Outcome: Progressing     Problem: Wound  Goal: Optimal Coping  Outcome: Progressing  Goal: Optimal Functional Ability  Outcome: Progressing  Goal: Absence of Infection Signs and Symptoms  Outcome: Progressing  Goal: Improved Oral Intake  Outcome: Progressing  Goal: Optimal Pain Control and Function  Outcome: Progressing  Goal: Skin Health and Integrity  Outcome: Progressing  Goal: Optimal Wound Healing  Outcome: Progressing     Problem: Infection  Goal: Absence of Infection Signs and Symptoms  Outcome: Progressing    Patient vitally stable with no ss of distress within the shift. Safety maintained with bed wheels locked, side rails up, call light and frequently needed items kept within reach. Needs attended. Meds given per MAR. Rest promoted. Will continue POC.

## 2024-09-09 NOTE — PT/OT/SLP PROGRESS
Occupational Therapy  OT/SHUBHAM Client Care Conference Note    Patient Name:  Josh Pinto   MRN:  [id number]    A client care conference was completed by the OTR and the SYED prior to treatment by the SYED to discuss the patient's POC and current status.      9/9/2024

## 2024-09-09 NOTE — PLAN OF CARE
09/09/24 1156   Post-Acute Status   Post-Acute Authorization Placement   Post-Acute Placement Status Referrals Sent   Discharge Plan   Discharge Plan A Home   Discharge Plan B Skilled Nursing Facility     Pt with no accepting SNF's. Will have to be here until 9/12/2024 to finish out his IV abx. Discharge Plan A and Plan B have been determined by review of patient's clinical status, future medical and therapeutic needs, and coverage/benefits for post-acute care in coordination with multidisciplinary team members.  Edward Lee, ADELIA    [facility]  [phone]

## 2024-09-09 NOTE — PROGRESS NOTES
"Robbie Sepulveda - Telemetry [facility] Medicine  Progress Note    Patient Name: Josh Pinto  MRN: [id number]  Patient Class: IP- Inpatient   Admission Date: 8/28/2024  Length of Stay: 12 days  Attending Physician: Chula Stokes MD  Primary Care Provider: Hali Burrell MD        Subjective:     Principal Problem:Cholangitis        HPI:  Mr. Pinto is an 84 year old male with PMH notable for BPH with urinary obstruction resulting in multiple UTIs, SBP s/p hernia repair, HTN, CKD, gout, AF (not on A/C), prior Cdiff, and RCC s/p nephrectomy who presented to [facility] ED on 08/28 with dyspnea, nausea, vomiting, poor PO intake, and fatigue. Per chart review, EMS notes initial oxygen saturation in the 50s therefore he was placed on 15L NRB, however, weaned quickly to NC in th ED. Initial work up with elevated lactic, lipase, and transaminases. Imaging studies performed. Abdominal ultrasound noted gallstones with findings concerning for acute cholecystitis.  "There are numerous mobile gallstones.  Gallbladder wall is mildly thickened measuring 4 mm.  No pericholecystic fluid is present.  Common duct is dilated measuring 14 mm.  Moderate intrahepatic biliary ductal dilation." CT abdomen and pelvis without contrast noted intra and extrahepatic biliary ductal dilatation of uncertain etiology  Massive enlargement of the prostate similar to previous study.  Previous left nephrectomy.  Stable rim calcified structure at the midpole of the right kidney.  There are distal colonic diverticula.  No evidence of acute diverticulitis.  No bowel obstruction no inflammation bowel.  Appendix is present.  No ascites."      In the ED he as given IV steroids, nebulizer treatments, IVF and Zosyn. His lactic acid peaked at 9.9. Given this, [place] [facility] for AES consultation.    Overview/Hospital Course:  8/29 ERCP (see separate note in procedures tab) with removal of CBD stones and placement of " temporary CBD stent in setting of presumed cholangitis; numerous GB stones also seen during cholangiogram.  General surgery consulted.  Patient taken for laparoscopic cholecystectomy on 08/30.  Tolerated this well.  ID consulted for antibiotic recommendations.  9/04 OPAT recs are in. Zosyn 4.5g IV q 12 hours through 9/12. Is ready for dc, but he doesn't feel comfortable with home infusion, will explore options. Asked ID if there is a once per day option to be given at infusion center, but there are no options for that.     Interval History:  Patient with no acute pain complaints.  He is worried about the oncoming storm.  Still uncomfortable with giving himself antibiotics at home.      Review of Systems  A comprehensive review of systems was negative except as noted above.     Objective:     Vital Signs (Most Recent):  Temp: 97.5 °F (36.4 °C) (09/09/24 1606)  Pulse: 81 (09/09/24 1606)  Resp: 18 (09/09/24 1606)  BP: 119/63 (09/09/24 1606)  SpO2: (!) 93 % (09/09/24 1606) Vital Signs (24h Range):  Temp:  [97.5 °F (36.4 °C)-98.8 °F (37.1 °C)] 97.5 °F (36.4 °C)  Pulse:  [53-81] 81  Resp:  [18-20] 18  SpO2:  [91 %-95 %] 93 %  BP: (114-139)/(63-78) 119/63     Weight: 68 kg (149 lb 14.6 oz)  Body mass index is 22.14 kg/m².    Intake/Output Summary (Last 24 hours) at 9/9/2024 1814  Last data filed at 9/9/2024 0011  Gross per 24 hour   Intake --   Output 400 ml   Net -400 ml         Physical Exam  Constitutional:       General: He is not in acute distress.     Appearance: Normal appearance.   HENT:      Head: Normocephalic and atraumatic.   Cardiovascular:      Rate and Rhythm: Normal rate and regular rhythm.   Pulmonary:      Effort: Pulmonary effort is normal. No respiratory distress.   Abdominal:      General: Abdomen is flat. There is no distension.      Palpations: Abdomen is soft.      Tenderness: There is no abdominal tenderness.      Comments: Well healed midline incision  Well healed robotic incisions    Musculoskeletal: Chronic deformities of both knees  Right knee is not warm or erythematous  Neurological:      General: No focal deficit present.      Mental Status: He is alert and oriented to person, place, and time.   Psychiatric:         Behavior: Behavior normal.         Thought Content: Thought content normal.         Significant Labs: All pertinent labs within the past 24 hours have been reviewed.  BMP:   Recent Labs   Lab 09/08/24  0701   *      K 3.9      CO2 17*   BUN 26*   CREATININE 2.7*   CALCIUM 8.7       Significant Imaging: I have reviewed all pertinent imaging results/findings within the past 24 hours.    Assessment/Plan:      * Cholangitis  Abdominal ultrasound noted gallstones with findings concerning for acute cholecystitis.  There are numerous mobile gallstones.  Gallbladder wall is mildly thickened measuring 4 mm.  No pericholecystic fluid is present.  Common duct is dilated measuring 14 mm.  Moderate intrahepatic biliary ductal dilation.     -CT abdomen and pelvis without contrast noted intra and extrahepatic biliary ductal dilatation of uncertain etiology.  Follow-up MRCP would be helpful.  Suspect the presence of gallstones.  Massive enlargement of the prostate similar to previous study.  Previous left nephrectomy.  Stable rim calcified structure at the midpole of the right kidney.  There are distal colonic diverticula.  No evidence of acute diverticulitis.  No bowel obstruction no inflammation bowel.     -- AES consulted. Appreciate assistance 8/29 ERCP (see separate note in procedures tab) with removal of CBD stones and placement of temporary CBD stent in setting of presumed cholangitis; numerous GB stones also seen during cholangiogram.  General surgery consulted.  Patient taken for laparoscopic cholecystectomy on 08/30.  Tolerated this well.   -- continue antibiotics per ID   -- Trending LFTs and renal function     Acute hypoxemic respiratory failure  -improved with  treatment- patient with stable O2 sats on room air    Bacteremia  -Blood cultures positive for Klebsiella oxytocia.  Rapid ID also positive for Pseudomonas aeruginosa, though this was not grown in culture.  Repeat blood cultures negative.  -Plan was to dc home on Zosyn, but he is uncomfortable with home infusion. ID recommends IV Zosyn through 09/12/24   -Dicussed with ID, no other abx options.  -Placement if possible      PAF (paroxysmal atrial fibrillation), [id number] score 3, decline OAC  -Patient with Paroxysmal (<7 days) atrial fibrillation which is controlled currently.   [id number] Score: 2.   -Patient states he was told he does not need AC due to Afib episode provoked by surgery. Continue prophylactic heparin dosing  -holding home metoprolol with bradycardia    Chronic gout of multiple sites  -Patient complains of right knee pain. Believes he is having gout flare though Knee does not appear inflamed  -continue home allopurinol.  Given 1 dose of renally dosed colchicine      HTN (hypertension), dx 1985  -Chronic, controlled. Latest blood pressure and vitals reviewed-     Temp:  [97.5 °F (36.4 °C)-98.8 °F (37.1 °C)]   Pulse:  [53-81]   Resp:  [18-20]   BP: (114-139)/(63-78)   SpO2:  [91 %-95 %] .   Home meds for hypertension were reviewed and noted below.   Hypertension Medications               metoprolol tartrate (LOPRESSOR) 25 MG tablet Take 12.5 mg by mouth 2 (two) times daily.       While in the hospital, will manage blood pressure as follows; holding Lopressor for bradycardia    Stage 4 chronic kidney disease  Baseline Cr: 2.8-3  Cr at admission to ICU: 3   Estimated Creatinine Clearance: 18.3 mL/min (A) (based on SCr of 3 mg/dL (H)).     -- Avoid nephrotoxins   -- Strict I&O   -- Renally dose all medications to appropriate GFR / CrCl   -- Cr appears to be at baseline.  Continue to monitor    BPH with urinary obstruction  Continue home Flomax        VTE Risk Mitigation (From admission, onward)            Ordered     heparin (porcine) injection 5,000 Units  Every 8 hours         08/28/24 2339     IP VTE HIGH RISK PATIENT  Once         08/28/24 2339     Place sequential compression device  Until discontinued         08/28/24 2339                    Discharge Planning   RAMÓN: 9/12/2024     Code Status: Full Code   Is the patient medically ready for discharge?:     Reason for patient still in hospital (select all that apply): Pending disposition  Discharge Plan A: Home   Discharge Delays: None known at this time        Continue inpatient. Patient requires IV Zosyn through 09/12/24 - he is refusing home IV therapy. We are working with CM on options, may have to remain inpatient for duration of therapy        Chula Stokes MD  Department of Hospital Medicine  [facility]  09/09/2024

## 2024-09-10 PROBLEM — E87.20 METABOLIC ACIDOSIS: Status: ACTIVE | Noted: 2024-09-10

## 2024-09-10 PROBLEM — E87.3 METABOLIC ALKALOSIS: Status: ACTIVE | Noted: 2024-09-10

## 2024-09-10 PROBLEM — Z79.52 CURRENT CHRONIC USE OF SYSTEMIC STEROIDS: Status: ACTIVE | Noted: 2024-09-10

## 2024-09-10 LAB
ALBUMIN SERPL BCP-MCNC: 2.2 G/DL (ref 3.5–5.2)
ANION GAP SERPL CALC-SCNC: 10 MMOL/L (ref 8–16)
BUN SERPL-MCNC: 26 MG/DL (ref 8–23)
CALCIUM SERPL-MCNC: 8.7 MG/DL (ref 8.7–10.5)
CHLORIDE SERPL-SCNC: 112 MMOL/L (ref 95–110)
CO2 SERPL-SCNC: 16 MMOL/L (ref 23–29)
CREAT SERPL-MCNC: 2.8 MG/DL (ref 0.5–1.4)
ERYTHROCYTE [DISTWIDTH] IN BLOOD BY AUTOMATED COUNT: 14.8 % (ref 11.5–14.5)
EST. GFR  (NO RACE VARIABLE): 21.8 ML/MIN/1.73 M^2
GLUCOSE SERPL-MCNC: 79 MG/DL (ref 70–110)
HCT VFR BLD AUTO: 35.4 % (ref 40–54)
HGB BLD-MCNC: 11.9 G/DL (ref 14–18)
MCH RBC QN AUTO: 31.6 PG (ref 27–31)
MCHC RBC AUTO-ENTMCNC: 33.6 G/DL (ref 32–36)
MCV RBC AUTO: 94 FL (ref 82–98)
PHOSPHATE SERPL-MCNC: 3 MG/DL (ref 2.7–4.5)
PLATELET # BLD AUTO: 272 K/UL (ref 150–450)
PMV BLD AUTO: 11.5 FL (ref 9.2–12.9)
POTASSIUM SERPL-SCNC: 3.9 MMOL/L (ref 3.5–5.1)
RBC # BLD AUTO: 3.77 M/UL (ref 4.6–6.2)
SODIUM SERPL-SCNC: 138 MMOL/L (ref 136–145)
WBC # BLD AUTO: 10.17 K/UL (ref 3.9–12.7)

## 2024-09-10 PROCEDURE — 97116 GAIT TRAINING THERAPY: CPT | Mod: CQ

## 2024-09-10 PROCEDURE — 63600175 PHARM REV CODE 636 W HCPCS: Performed by: INTERNAL MEDICINE

## 2024-09-10 PROCEDURE — 85027 COMPLETE CBC AUTOMATED: CPT | Performed by: STUDENT IN AN ORGANIZED HEALTH CARE EDUCATION/TRAINING PROGRAM

## 2024-09-10 PROCEDURE — A4216 STERILE WATER/SALINE, 10 ML: HCPCS | Performed by: INTERNAL MEDICINE

## 2024-09-10 PROCEDURE — 63600175 PHARM REV CODE 636 W HCPCS: Performed by: STUDENT IN AN ORGANIZED HEALTH CARE EDUCATION/TRAINING PROGRAM

## 2024-09-10 PROCEDURE — 25000003 PHARM REV CODE 250: Performed by: INTERNAL MEDICINE

## 2024-09-10 PROCEDURE — 21400001 HC TELEMETRY ROOM

## 2024-09-10 PROCEDURE — 63600175 PHARM REV CODE 636 W HCPCS

## 2024-09-10 PROCEDURE — 36415 COLL VENOUS BLD VENIPUNCTURE: CPT | Performed by: STUDENT IN AN ORGANIZED HEALTH CARE EDUCATION/TRAINING PROGRAM

## 2024-09-10 PROCEDURE — 27000207 HC ISOLATION

## 2024-09-10 PROCEDURE — 97535 SELF CARE MNGMENT TRAINING: CPT | Mod: CO

## 2024-09-10 PROCEDURE — 25000003 PHARM REV CODE 250: Performed by: HOSPITALIST

## 2024-09-10 PROCEDURE — 25000003 PHARM REV CODE 250: Performed by: STUDENT IN AN ORGANIZED HEALTH CARE EDUCATION/TRAINING PROGRAM

## 2024-09-10 PROCEDURE — 25000003 PHARM REV CODE 250: Performed by: NURSE PRACTITIONER

## 2024-09-10 PROCEDURE — 80069 RENAL FUNCTION PANEL: CPT | Performed by: STUDENT IN AN ORGANIZED HEALTH CARE EDUCATION/TRAINING PROGRAM

## 2024-09-10 RX ORDER — SODIUM BICARBONATE 650 MG/1
650 TABLET ORAL ONCE
Status: COMPLETED | OUTPATIENT
Start: 2024-09-10 | End: 2024-09-10

## 2024-09-10 RX ORDER — SODIUM BICARBONATE 650 MG/1
650 TABLET ORAL DAILY
Status: DISCONTINUED | OUTPATIENT
Start: 2024-09-10 | End: 2024-09-12 | Stop reason: HOSPADM

## 2024-09-10 RX ADMIN — Medication 10 ML: at 12:09

## 2024-09-10 RX ADMIN — ALLOPURINOL 50 MG: 300 TABLET ORAL at 09:09

## 2024-09-10 RX ADMIN — TAMSULOSIN HYDROCHLORIDE 0.4 MG: 0.4 CAPSULE ORAL at 09:09

## 2024-09-10 RX ADMIN — Medication 1 CAPSULE: at 09:09

## 2024-09-10 RX ADMIN — FINASTERIDE 5 MG: 5 TABLET, FILM COATED ORAL at 09:09

## 2024-09-10 RX ADMIN — FLUDROCORTISONE ACETATE 100 MCG: 0.1 TABLET ORAL at 06:09

## 2024-09-10 RX ADMIN — HEPARIN SODIUM 5000 UNITS: 5000 INJECTION INTRAVENOUS; SUBCUTANEOUS at 02:09

## 2024-09-10 RX ADMIN — PIPERACILLIN SODIUM AND TAZOBACTAM SODIUM 4.5 G: 4; .5 INJECTION, POWDER, FOR SOLUTION INTRAVENOUS at 03:09

## 2024-09-10 RX ADMIN — Medication 10 ML: at 06:09

## 2024-09-10 RX ADMIN — ACETAMINOPHEN 650 MG: 325 TABLET ORAL at 02:09

## 2024-09-10 RX ADMIN — SODIUM BICARBONATE 650 MG TABLET 650 MG: at 03:09

## 2024-09-10 RX ADMIN — PIPERACILLIN SODIUM AND TAZOBACTAM SODIUM 4.5 G: 4; .5 INJECTION, POWDER, FOR SOLUTION INTRAVENOUS at 02:09

## 2024-09-10 RX ADMIN — HEPARIN SODIUM 5000 UNITS: 5000 INJECTION INTRAVENOUS; SUBCUTANEOUS at 06:09

## 2024-09-10 RX ADMIN — Medication 10 ML: at 02:09

## 2024-09-10 RX ADMIN — HEPARIN SODIUM 5000 UNITS: 5000 INJECTION INTRAVENOUS; SUBCUTANEOUS at 09:09

## 2024-09-10 RX ADMIN — SODIUM BICARBONATE 650 MG TABLET 650 MG: at 09:09

## 2024-09-10 NOTE — PHYSICIAN QUERY
"Provider, due to the conflicting clinical picture, please clinically validate the diagnosis of "Acute hypoxemic respiratory failure." If validated, please provide additional clinical support for the diagnosis. initial oxygen saturation in the 50s therefore he was placed on 15L NRB,     "

## 2024-09-10 NOTE — PROGRESS NOTES
"Robbie Sepulveda - Telemetry [facility] Medicine  Progress Note    Patient Name: Josh Pinto  MRN: [id number]  Patient Class: IP- Inpatient   Admission Date: 8/28/2024  Length of Stay: 13 days  Attending Physician: Chula Stokes MD  Primary Care Provider: Hali Burrell MD        Subjective:     Principal Problem:Cholangitis        HPI:  Mr. Pinto is an 84 year old male with PMH notable for BPH with urinary obstruction resulting in multiple UTIs, SBP s/p hernia repair, HTN, CKD, gout, AF (not on A/C), prior Cdiff, and RCC s/p nephrectomy who presented to [facility] ED on 08/28 with dyspnea, nausea, vomiting, poor PO intake, and fatigue. Per chart review, EMS notes initial oxygen saturation in the 50s therefore he was placed on 15L NRB, however, weaned quickly to NC in th ED. Initial work up with elevated lactic, lipase, and transaminases. Imaging studies performed. Abdominal ultrasound noted gallstones with findings concerning for acute cholecystitis.  "There are numerous mobile gallstones.  Gallbladder wall is mildly thickened measuring 4 mm.  No pericholecystic fluid is present.  Common duct is dilated measuring 14 mm.  Moderate intrahepatic biliary ductal dilation." CT abdomen and pelvis without contrast noted intra and extrahepatic biliary ductal dilatation of uncertain etiology  Massive enlargement of the prostate similar to previous study.  Previous left nephrectomy.  Stable rim calcified structure at the midpole of the right kidney.  There are distal colonic diverticula.  No evidence of acute diverticulitis.  No bowel obstruction no inflammation bowel.  Appendix is present.  No ascites."      In the ED he as given IV steroids, nebulizer treatments, IVF and Zosyn. His lactic acid peaked at 9.9. Given this, transfer was requested to [facility] [facility] Sepulveda for AES consultation.    Overview/Hospital Course:  8/29 ERCP (see separate note in procedures tab) with removal of CBD stones and placement of " "temporary CBD stent in setting of presumed cholangitis; numerous GB stones also seen during cholangiogram.  General surgery consulted.  Patient taken for laparoscopic cholecystectomy on 08/30.  Tolerated this well.  ID consulted for antibiotic recommendations.  9/04 OPAT recs are in. Zosyn 4.5g IV q 12 hours through 9/12. Is ready for dc, but he doesn't feel comfortable with home infusion, will explore options. Asked ID if there is a once per day option to be given at infusion center, but there are no options for that.     Interval History:  Patient states his knee is "back to normal." Reports good mobility around the room.  Able to take a shower.  Still with some mild abdominal discomfort.      Review of Systems  A comprehensive review of systems was negative except as noted above.     Objective:     Vital Signs (Most Recent):  Temp: 97.7 °F (36.5 °C) (09/10/24 1111)  Pulse: 70 (09/10/24 1111)  Resp: 19 (09/10/24 1111)  BP: 137/73 (09/10/24 1111)  SpO2: (!) 91 % (09/10/24 1111) Vital Signs (24h Range):  Temp:  [97.5 °F (36.4 °C)-98.6 °F (37 °C)] 97.7 °F (36.5 °C)  Pulse:  [] 70  Resp:  [18-19] 19  SpO2:  [91 %-95 %] 91 %  BP: (112-139)/(61-73) 137/73     Weight: 68 kg (149 lb 14.6 oz)  Body mass index is 22.14 kg/m².    Intake/Output Summary (Last 24 hours) at 9/10/2024 1428  Last data filed at 9/10/2024 0321  Gross per 24 hour   Intake --   Output 200 ml   Net -200 ml         Physical Exam  Constitutional:       General: He is not in acute distress.     Appearance: Normal appearance.   HENT:      Head: Normocephalic and atraumatic.   Cardiovascular:      Rate and Rhythm: Normal rate and regular rhythm.   Pulmonary:      Effort: Pulmonary effort is normal. No respiratory distress.   Abdominal:      General: Abdomen is flat. There is no distension.      Palpations: Abdomen is soft.      Tenderness: There is no abdominal tenderness.      Comments: Well healed midline incision  Well healed robotic incisions "   Musculoskeletal: Chronic deformities of both knees  Right knee is not warm or erythematous  Neurological:      General: No focal deficit present.      Mental Status: He is alert and oriented to person, place, and time.   Psychiatric:         Behavior: Behavior normal.         Thought Content: Thought content normal.         Significant Labs: All pertinent labs within the past 24 hours have been reviewed.  BMP:   Recent Labs   Lab 09/10/24  0220   GLU 79      K 3.9   *   CO2 16*   BUN 26*   CREATININE 2.8*   CALCIUM 8.7       Significant Imaging: I have reviewed all pertinent imaging results/findings within the past 24 hours.    Assessment/Plan:      * Cholangitis  Abdominal ultrasound noted gallstones with findings concerning for acute cholecystitis.  There are numerous mobile gallstones.  Gallbladder wall is mildly thickened measuring 4 mm.  No pericholecystic fluid is present.  Common duct is dilated measuring 14 mm.  Moderate intrahepatic biliary ductal dilation.     -CT abdomen and pelvis without contrast noted intra and extrahepatic biliary ductal dilatation of uncertain etiology.  Follow-up MRCP would be helpful.  Suspect the presence of gallstones.  Massive enlargement of the prostate similar to previous study.  Previous left nephrectomy.  Stable rim calcified structure at the midpole of the right kidney.  There are distal colonic diverticula.     -- AES consulted. Appreciate assistance 8/29 ERCP (see separate note in procedures tab) with removal of CBD stones and placement of temporary CBD stent in setting of presumed cholangitis; numerous GB stones also seen during cholangiogram.  General surgery consulted.  Patient taken for laparoscopic cholecystectomy on 08/30.  Tolerated this well.   -- continue antibiotics per ID   -- Trending LFTs and renal function     Acute hypoxemic respiratory failure  -improved with treatment- patient with stable O2 sats on room air  -start incentive  spirometry    Bacteremia  -Blood cultures positive for Klebsiella oxytocia.  Rapid ID also positive for Pseudomonas aeruginosa, though this was not grown in culture.  Repeat blood cultures negative.  -Plan was to dc home on Zosyn, but he is uncomfortable with home infusion. ID recommends IV Zosyn through 09/12/24   -Dicussed with ID, no other abx options.  -Placement if possible      Current chronic use of systemic steroids  -patient takes fludrocortisone for history of hypotension and orthostatic issues      PAF (paroxysmal atrial fibrillation), [id number] score 3, decline OAC  -Patient with Paroxysmal (<7 days) atrial fibrillation which is controlled currently.   [id number] Score: 2.   -Patient states he was told he does not need AC due to Afib episode provoked by surgery. Continue prophylactic heparin dosing  -holding home metoprolol with bradycardia    Chronic gout of multiple sites  -Patient complains of right knee pain. Believes he is having gout flare though Knee does not appear inflamed  -continue home allopurinol.  Given 1 dose of renally dosed colchicine  - Improved symptoms      HTN (hypertension), dx 1985  -Chronic, controlled. Latest blood pressure and vitals reviewed-     Temp:  [97.5 °F (36.4 °C)-98.8 °F (37.1 °C)]   Pulse:  [53-81]   Resp:  [18-20]   BP: (114-139)/(63-78)   SpO2:  [91 %-95 %] .   Home meds for hypertension were reviewed and noted below.   Hypertension Medications               metoprolol tartrate (LOPRESSOR) 25 MG tablet Take 12.5 mg by mouth 2 (two) times daily.       While in the hospital, will manage blood pressure as follows; holding Lopressor for bradycardia    Stage 4 chronic kidney disease  Baseline Cr: 2.8-3  Cr at admission to ICU: 3   Estimated Creatinine Clearance: 18.3 mL/min (A) (based on SCr of 3 mg/dL (H)).     -- Avoid nephrotoxins   -- Strict I&O   -- Renally dose all medications to appropriate GFR / CrCl   -- Cr appears to be at baseline.  Continue to  monitor    BPH with urinary obstruction  Continue home Flomax        VTE Risk Mitigation (From admission, onward)           Ordered     heparin (porcine) injection 5,000 Units  Every 8 hours         08/28/24 2339     IP VTE HIGH RISK PATIENT  Once         08/28/24 2339     Place sequential compression device  Until discontinued         08/28/24 2339                    Discharge Planning   RAMÓN: 9/12/2024     Code Status: Full Code   Is the patient medically ready for discharge?:     Reason for patient still in hospital (select all that apply): Pending disposition  Discharge Plan A: Home   Discharge Delays: None known at this time        Continue inpatient. Patient requires IV Zosyn through 09/12/24 - he is refusing home IV therapy. Worked with CM on options, he we will have to remain inpatient for duration of therapy        Chula Stokes MD  Department of Hospital Medicine  [facility]  09/10/2024

## 2024-09-10 NOTE — PT/OT/SLP PROGRESS
"Physical Therapy Treatment    Patient Name:  Josh Pinto   MRN:  [id number]    Recommendations:     Discharge Recommendations: Low Intensity Therapy  Discharge Equipment Recommendations: none  Barriers to discharge: None    Assessment:     Josh Pinto is a 84 y.o. male admitted with a medical diagnosis of Cholangitis.  He presents with the following impairments/functional limitations: impaired endurance, impaired self care skills, impaired functional mobility, gait instability Pt tolerated well, pt is very pleasant, demo good effort, S with trfs and SBA/S with gait no AD 75 ft x 2 trials no LOB, pt would continue to benefit from skilled PT services to improve overall functional mobility, strength and endurance.  .    Rehab Prognosis: Good; patient would benefit from acute skilled PT services to address these deficits and reach maximum level of function.    Recent Surgery: Procedure(s) (LRB):  CHOLECYSTECTOMY, LAPAROSCOPIC (N/A) 11 Days Post-Op    Plan:     During this hospitalization, patient to be seen 3 x/week to address the identified rehab impairments via gait training, therapeutic activities, therapeutic exercises and progress toward the following goals:    Plan of Care Expires:  10/06/24    Subjective     Chief Complaint: none stated  Patient/Family Comments/goals: "pretty good"   Pain/Comfort:  Pain Rating 1: 0/10  Pain Rating Post-Intervention 2: 0/10      Objective:     Communicated with nsg prior to session.clear for therapy  Patient found  with  (in bed, no active lines) upon PT entry to room.     General Precautions: Standard, fall, contact  Orthopedic Precautions: N/A  Braces: N/A  Respiratory Status: Room air     Functional Mobility:  Bed Mobility:     Supine to Sit: modified independence, supervision, and HOB slightly elev  Sit to Supine: modified independence, supervision, and HOB slightly elev  Transfers:     Sit to Stand:  supervision with no AD and from EOB and BSchair  Bed to Chair: " supervision with  no AD  using  Stand Pivot  Gait: amb with no AD SBA/S in room due to isolation prec 75 ft x 2 trials seated rest break no LOB occ touching bed frame as passing      AM-PAC 6 CLICK MOBILITY  Turning over in bed (including adjusting bedclothes, sheets and blankets)?: 4  Sitting down on and standing up from a chair with arms (e.g., wheelchair, bedside commode, etc.): 4  Moving from lying on back to sitting on the side of the bed?: 4  Moving to and from a bed to a chair (including a wheelchair)?: 4  Need to walk in hospital room?: 3  Climbing 3-5 steps with a railing?: 3  Basic Mobility Total Score: 22       Treatment & Education:  Patient Education: Education on the importance/benefits of PT services, Safety/proper tech with trfs/gait, On the importance of increasing OOB tolerance/prolong affects of bed rest, Use of the call button for A with OOB mobility/use of AD/fall risk.      Patient left supine with call button in reach and pt wishing to take a nap ..    GOALS:   Multidisciplinary Problems       Physical Therapy Goals          Problem: Physical Therapy    Goal Priority Disciplines Outcome Goal Variances Interventions   Physical Therapy Goal     PT, PT/OT Progressing     Description: Goals to be met by:      Patient will increase functional independence with mobility by performin. Supine to sit with Modified [place]  2. Sit to supine with Modified [place]  3. Sit to stand transfer with Modified [place]  4. Gait  x 200 feet with Modified [place] using LRAD or no AD.                          Time Tracking:     PT Received On: 09/10/24  PT Start Time: 1400     PT Stop Time: 1416  PT Total Time (min): 16 min     Billable Minutes: Gait Training 16    Treatment Type: Treatment  PT/PTA: PTA     Number of PTA visits since last PT visit: 1     09/10/2024

## 2024-09-10 NOTE — PHYSICIAN QUERY
"Please clarify/confirm the consultants diagnosis of "septic cholangitis."    Diagnosis ruled in, but it resolved prior to my assessment of the patient    "

## 2024-09-10 NOTE — PT/OT/SLP PROGRESS
Occupational Therapy   Treatment    Name: Josh Pinto  MRN: [id number]  Admitting Diagnosis:  Cholangitis  11 Days Post-Op    Recommendations:     Discharge Recommendations: Low Intensity Therapy  Discharge Equipment Recommendations:  none  Barriers to discharge:  None    Assessment:     Josh Pinto is a 84 y.o. male with a medical diagnosis of Cholangitis.  He presents with the following performance deficits affecting function are impaired endurance, impaired self care skills, impaired functional mobility, gait instability, impaired balance, decreased safety awareness. Pt limited by mild unsteady gait and poor safety insight. However, pt made good progress towards goals. Patient continues to demonstrate the need for low intensity therapy on a scheduled basis exhibited by decreased independence with self-care and functional mobility       Rehab Prognosis:  Good; patient would benefit from acute skilled OT services to address these deficits and reach maximum level of function.       Plan:     Patient to be seen 3 x/week to address the above listed problems via self-care/home management, therapeutic activities, therapeutic exercises, neuromuscular re-education  Plan of Care Expires: 10/03/24  Plan of Care Reviewed with: patient    Subjective     Chief Complaint: none this session   Patient/Family Comments/goals: finish the IV antibiotics   Pain/Comfort:  Pain Rating 1: 0/10    Objective:     Communicated with: nurse hess prior to session.  Patient found ambulatory in room/lopes with  (no active lines) upon OT entry to room.    General Precautions: Standard, fall, contact    Orthopedic Precautions:N/A  Braces: N/A  Respiratory Status: Room air     Occupational Performance:     Bed Mobility:    Pt found OOB    Functional Mobility/Transfers:  Patient completed Sit <> Stand Transfer with (S)  with  no assistive device   Patient completed  Chair Transfer using Step Transfer technique with (S)  with no assistive  device  Patient completed Toilet transfer using Step Transfer technique with (S) with no AD  Patient completed Tub Transfer Step Transfer technique with contact guard assistance with no AD,( x1 LOB exiting shower but able to self correct)  Functional Mobility: pt ambulated 24 ft to/from bathroom (S) no AD  Pt stood while showering x 8 minutes (S) with no AD    Activities of Daily Living:  Bathing: supervision standing in shower with no AD   Upper Body Dressing: modified independence to doff/don clean gown in standing  Lower Body Dressing: moderate assistance to don clean sock seated on toilet       [facility] 6 Click ADL: 21    Treatment & Education:  Pt cleared for shower per doctor Chula Stokes (Attending). Nurse wrapped PICC line prior to shower.  Pt edu on goals and progress.   Pt edu extensively on safety and fall prevention strategies with functional mobility.  Pt edu to use call button for hospital staff with all mobility.  Addressed all pt questions/concerns with in the [facility] scope of practice.      Patient left up in chair with call button in reach and nurse notified    GOALS:   Multidisciplinary Problems       Occupational Therapy Goals          Problem: Occupational Therapy    Goal Priority Disciplines Outcome Interventions   Occupational Therapy Goal     OT, PT/OT Progressing    Description: Goals to be met by: 10/3/24     Patient will increase functional independence with ADLs by performing:    UE Dressing with Modified [place].  LE Dressing with Modified [place].  Grooming while standing at sink with Modified [place].  Toileting from toilet with Modified [place] for hygiene and clothing management.   Toilet transfer to toilet with Modified [place].                         Time Tracking:     OT Date of Treatment: 09/10/24  OT Start Time: 0958  OT Stop Time: 1011  OT Total Time (min): 13 min    Billable Minutes:Self Care/Home Management 13    OT/SHUBHAM: SHUBHAM     Number of SHUBHAM visits  since last OT visit: 2    9/10/2024

## 2024-09-10 NOTE — PLAN OF CARE
Problem: Physical Therapy  Goal: Physical Therapy Goal  Description: Goals to be met by:      Patient will increase functional independence with mobility by performin. Supine to sit with Modified [place]  2. Sit to supine with Modified [place]  3. Sit to stand transfer with Modified [place]  4. Gait  x 200 feet with Modified Story using LRAD or no AD.     Outcome: Progressing

## 2024-09-10 NOTE — PROGRESS NOTES
Pharmacist Renal Dose Adjustment Note    Josh Pinto is a 84 y.o. male being treated with the medication piperacillin-tazobactam    Patient Data:    Vital Signs (Most Recent):  Temp: 97.9 °F (36.6 °C) (09/10/24 0741)  Pulse: 104 (09/10/24 0741)  Resp: 18 (09/10/24 0741)  BP: 139/65 (09/10/24 0741)  SpO2: (!) 93 % (09/10/24 0741) Vital Signs (72h Range):  Temp:  [97.5 °F (36.4 °C)-98.8 °F (37.1 °C)]   Pulse:  []   Resp:  [16-20]   BP: (112-139)/(61-82)   SpO2:  [91 %-99 %]      Recent Labs   Lab 09/06/24  0826 09/08/24  0701 09/10/24  0220   CREATININE 2.7* 2.7* 2.8*     Serum creatinine: 2.8 mg/dL (H) 09/10/24 0220  Estimated creatinine clearance: 19.6 mL/min (A)    Piperacillin-tazobactam 4.5g IV q8h will be changed to 4.5g IV q12h     Pharmacist's Name: Bina Dumont  Pharmacist's Extension: [phone]

## 2024-09-10 NOTE — PLAN OF CARE
Patient stable, AOX4 , VSS. Safety measures ensured.call light within reach.bed in low position. No distress at night.

## 2024-09-11 PROCEDURE — 63600175 PHARM REV CODE 636 W HCPCS

## 2024-09-11 PROCEDURE — 63600175 PHARM REV CODE 636 W HCPCS: Performed by: STUDENT IN AN ORGANIZED HEALTH CARE EDUCATION/TRAINING PROGRAM

## 2024-09-11 PROCEDURE — 25000003 PHARM REV CODE 250: Performed by: NURSE PRACTITIONER

## 2024-09-11 PROCEDURE — 25000003 PHARM REV CODE 250: Performed by: STUDENT IN AN ORGANIZED HEALTH CARE EDUCATION/TRAINING PROGRAM

## 2024-09-11 PROCEDURE — 99900035 HC TECH TIME PER 15 MIN (STAT)

## 2024-09-11 PROCEDURE — 21400001 HC TELEMETRY ROOM

## 2024-09-11 PROCEDURE — A4216 STERILE WATER/SALINE, 10 ML: HCPCS | Performed by: INTERNAL MEDICINE

## 2024-09-11 PROCEDURE — 25000003 PHARM REV CODE 250: Performed by: INTERNAL MEDICINE

## 2024-09-11 PROCEDURE — 27000207 HC ISOLATION

## 2024-09-11 PROCEDURE — 25000003 PHARM REV CODE 250: Performed by: HOSPITALIST

## 2024-09-11 RX ORDER — TAMSULOSIN HYDROCHLORIDE 0.4 MG/1
0.8 CAPSULE ORAL DAILY
Status: DISCONTINUED | OUTPATIENT
Start: 2024-09-12 | End: 2024-09-12 | Stop reason: HOSPADM

## 2024-09-11 RX ORDER — TAMSULOSIN HYDROCHLORIDE 0.4 MG/1
0.4 CAPSULE ORAL ONCE
Status: COMPLETED | OUTPATIENT
Start: 2024-09-11 | End: 2024-09-11

## 2024-09-11 RX ADMIN — Medication 1 CAPSULE: at 08:09

## 2024-09-11 RX ADMIN — Medication 10 ML: at 12:09

## 2024-09-11 RX ADMIN — HEPARIN SODIUM 5000 UNITS: 5000 INJECTION INTRAVENOUS; SUBCUTANEOUS at 03:09

## 2024-09-11 RX ADMIN — Medication 10 ML: at 06:09

## 2024-09-11 RX ADMIN — TAMSULOSIN HYDROCHLORIDE 0.4 MG: 0.4 CAPSULE ORAL at 08:09

## 2024-09-11 RX ADMIN — PIPERACILLIN SODIUM AND TAZOBACTAM SODIUM 4.5 G: 4; .5 INJECTION, POWDER, FOR SOLUTION INTRAVENOUS at 03:09

## 2024-09-11 RX ADMIN — FINASTERIDE 5 MG: 5 TABLET, FILM COATED ORAL at 08:09

## 2024-09-11 RX ADMIN — TAMSULOSIN HYDROCHLORIDE 0.4 MG: 0.4 CAPSULE ORAL at 03:09

## 2024-09-11 RX ADMIN — SODIUM BICARBONATE 650 MG TABLET 650 MG: at 08:09

## 2024-09-11 RX ADMIN — HEPARIN SODIUM 5000 UNITS: 5000 INJECTION INTRAVENOUS; SUBCUTANEOUS at 10:09

## 2024-09-11 RX ADMIN — FLUDROCORTISONE ACETATE 100 MCG: 0.1 TABLET ORAL at 06:09

## 2024-09-11 RX ADMIN — PIPERACILLIN SODIUM AND TAZOBACTAM SODIUM 4.5 G: 4; .5 INJECTION, POWDER, FOR SOLUTION INTRAVENOUS at 02:09

## 2024-09-11 RX ADMIN — HEPARIN SODIUM 5000 UNITS: 5000 INJECTION INTRAVENOUS; SUBCUTANEOUS at 06:09

## 2024-09-11 NOTE — PROGRESS NOTES
"[facility] - Telemetry [facility] Medicine  Progress Note    Patient Name: Josh Pinto  MRN: [id number]  Patient Class: IP- Inpatient   Admission Date: 8/28/2024  Length of Stay: 14 days  Attending Physician: Chula Stokes MD  Primary Care Provider: Hali Burrell MD        Subjective:     Principal Problem:Cholangitis        HPI:  Mr. Pinto is an 84 year old male with PMH notable for BPH with urinary obstruction resulting in multiple UTIs, SBP s/p hernia repair, HTN, CKD, gout, AF (not on A/C), prior Cdiff, and RCC s/p nephrectomy who presented to [facility] ED on 08/28 with dyspnea, nausea, vomiting, poor PO intake, and fatigue. Per chart review, EMS notes initial oxygen saturation in the 50s therefore he was placed on 15L NRB, however, weaned quickly to NC in th ED. Initial work up with elevated lactic, lipase, and transaminases. Imaging studies performed. Abdominal ultrasound noted gallstones with findings concerning for acute cholecystitis.  "There are numerous mobile gallstones.  Gallbladder wall is mildly thickened measuring 4 mm.  No pericholecystic fluid is present.  Common duct is dilated measuring 14 mm.  Moderate intrahepatic biliary ductal dilation." CT abdomen and pelvis without contrast noted intra and extrahepatic biliary ductal dilatation of uncertain etiology  Massive enlargement of the prostate similar to previous study.  Previous left nephrectomy.  Stable rim calcified structure at the midpole of the right kidney.  There are distal colonic diverticula.  No evidence of acute diverticulitis.  No bowel obstruction no inflammation bowel.  Appendix is present.  No ascites."      In the ED he as given IV steroids, nebulizer treatments, IVF and Zosyn. His lactic acid peaked at 9.9. Given this, transfer was requested to [facility] for AES consultation.    Overview/Hospital Course:  8/29 ERCP (see separate note in procedures tab) with removal of CBD stones and placement of " temporary CBD stent in setting of presumed cholangitis; numerous GB stones also seen during cholangiogram.  General surgery consulted.  Patient taken for laparoscopic cholecystectomy on 08/30.  Tolerated this well.  ID consulted for antibiotic recommendations.  9/04 OPAT recs are in. Zosyn 4.5g IV q 12 hours through 9/12. Is ready for dc, but he doesn't feel comfortable with home infusion, will explore options. Asked ID if there is a once per day option to be given at infusion center, but there are no options for that.     Interval History:  Patient complains of difficulty urinating, urinary hesitancy this morning.  States he has issues with this periodically.  He does take Flomax.  He then had an episode where he needed to move quickly to get to the bathroom in time for a BM.  Symptoms improved with coffee.      Review of Systems  A comprehensive review of systems was negative except as noted above.     Objective:     Vital Signs (Most Recent):  Temp: 97.8 °F (36.6 °C) (09/11/24 1133)  Pulse: 67 (09/11/24 1133)  Resp: 19 (09/11/24 1133)  BP: 126/66 (09/11/24 1133)  SpO2: (!) 92 % (09/11/24 1133) Vital Signs (24h Range):  Temp:  [97.8 °F (36.6 °C)-98.7 °F (37.1 °C)] 97.8 °F (36.6 °C)  Pulse:  [] 67  Resp:  [17-19] 19  SpO2:  [92 %-95 %] 92 %  BP: (124-141)/(60-86) 126/66     Weight: 68 kg (149 lb 14.6 oz)  Body mass index is 22.14 kg/m².    Intake/Output Summary (Last 24 hours) at 9/11/2024 1346  Last data filed at 9/11/2024 0000  Gross per 24 hour   Intake --   Output 400 ml   Net -400 ml         Physical Exam  Constitutional:       General: He is not in acute distress.     Appearance: Normal appearance.   HENT:      Head: Normocephalic and atraumatic.   Cardiovascular:      Rate and Rhythm: Normal rate and regular rhythm.   Pulmonary:      Effort: Pulmonary effort is normal. No respiratory distress.   Abdominal:      General: Abdomen is flat. There is no distension.      Palpations: Abdomen is soft.       Tenderness: There is no abdominal tenderness.      Comments: Well healed midline incision  Well healed robotic incisions   Musculoskeletal: Chronic deformities of both knees  Right knee is not warm or erythematous  Neurological:      General: No focal deficit present.      Mental Status: He is alert and oriented to person, place, and time.   Psychiatric:         Behavior: Behavior normal.         Thought Content: Thought content normal.         Significant Labs: All pertinent labs within the past 24 hours have been reviewed.  BMP:   Recent Labs   Lab 09/10/24  0220   GLU 79      K 3.9   *   CO2 16*   BUN 26*   CREATININE 2.8*   CALCIUM 8.7       Significant Imaging: I have reviewed all pertinent imaging results/findings within the past 24 hours.    Assessment/Plan:      * Cholangitis  Abdominal ultrasound noted gallstones with findings concerning for acute cholecystitis.  There are numerous mobile gallstones.  Gallbladder wall is mildly thickened measuring 4 mm.  No pericholecystic fluid is present.  Common duct is dilated measuring 14 mm.  Moderate intrahepatic biliary ductal dilation.     -CT abdomen and pelvis without contrast noted intra and extrahepatic biliary ductal dilatation of uncertain etiology.  Follow-up MRCP would be helpful.  Suspect the presence of gallstones.  Massive enlargement of the prostate similar to previous study.  Previous left nephrectomy.  Stable rim calcified structure at the midpole of the right kidney.  There are distal colonic diverticula.     -- AES consulted. Appreciate assistance 8/29 ERCP (see separate note in procedures tab) with removal of CBD stones and placement of temporary CBD stent in setting of presumed cholangitis; numerous GB stones also seen during cholangiogram.  General surgery consulted.  Patient taken for laparoscopic cholecystectomy on 08/30.  Tolerated this well.   -- continue antibiotics per ID   -- Trending LFTs and renal function     Acute hypoxemic  respiratory failure  -improved with treatment- patient with stable O2 sats on room air  -start incentive spirometry    Bacteremia  -Blood cultures positive for Klebsiella oxytocia.  Rapid ID also positive for Pseudomonas aeruginosa, though this was not grown in culture.  Repeat blood cultures negative.  -Plan was to dc home on Zosyn, but he is uncomfortable with home infusion. ID recommends IV Zosyn through 09/12/24   -Dicussed with ID, no other abx options.      Metabolic acidosis  -start po Bicarb      Current chronic use of systemic steroids  -patient takes fludrocortisone for history of hypotension and orthostatic issues      PAF (paroxysmal atrial fibrillation), [id number] score 3, decline OAC  -Patient with Paroxysmal (<7 days) atrial fibrillation which is controlled currently.   [id number] Score: 2.   -Patient states he was told he does not need AC due to Afib episode provoked by surgery. Continue prophylactic heparin dosing  -holding home metoprolol with bradycardia    Chronic gout of multiple sites  -Patient complains of right knee pain. Believes he is having gout flare though Knee does not appear inflamed  -continue home allopurinol.  Given 1 dose of renally dosed colchicine  - Improved symptoms      HTN (hypertension), dx 1985  -Chronic, controlled. Latest blood pressure and vitals reviewed-     Temp:  [97.5 °F (36.4 °C)-98.8 °F (37.1 °C)]   Pulse:  [53-81]   Resp:  [18-20]   BP: (114-139)/(63-78)   SpO2:  [91 %-95 %] .   Home meds for hypertension were reviewed and noted below.   Hypertension Medications               metoprolol tartrate (LOPRESSOR) 25 MG tablet Take 12.5 mg by mouth 2 (two) times daily.       While in the hospital, will manage blood pressure as follows; holding Lopressor for bradycardia    Stage 4 chronic kidney disease  Baseline Cr: 2.8-3  Cr at admission to ICU: 3   Estimated Creatinine Clearance: 18.3 mL/min (A) (based on SCr of 3 mg/dL (H)).     -- Avoid nephrotoxins   -- Strict  I&O   -- Renally dose all medications to appropriate GFR / CrCl   -- Cr appears to be at baseline.  Continue to monitor    BPH with urinary obstruction  Continue home Flomax- we will try increased dose due to symptoms worsening today        VTE Risk Mitigation (From admission, onward)           Ordered     heparin (porcine) injection 5,000 Units  Every 8 hours         08/28/24 2339     IP VTE HIGH RISK PATIENT  Once         08/28/24 2339     Place sequential compression device  Until discontinued         08/28/24 2339                    Discharge Planning   RAMÓN: 9/12/2024     Code Status: Full Code   Is the patient medically ready for discharge?:     Reason for patient still in hospital (select all that apply): Pending disposition  Discharge Plan A: Home   Discharge Delays: None known at this time        Continue inpatient. Patient requires IV Zosyn through 09/12/24 - he is refusing home IV therapy. Worked with CM on options, he we will have to remain inpatient for duration of therapy        Chula Stokes MD  Department of Hospital Medicine  [facility]  09/11/2024

## 2024-09-11 NOTE — PLAN OF CARE
Problem: Adult Inpatient Plan of Care  Goal: Plan of Care Review  Outcome: Progressing  Goal: Patient-Specific Goal (Individualized)  Outcome: Progressing  Goal: Absence of Hospital-Acquired Illness or Injury  Outcome: Progressing  Goal: Optimal Comfort and Wellbeing  Outcome: Progressing  Goal: Readiness for Transition of Care  Outcome: Progressing     Problem: Fall Injury Risk  Goal: Absence of Fall and Fall-Related Injury  Outcome: Progressing     Problem: Skin Injury Risk Increased  Goal: Skin Health and Integrity  Outcome: Progressing     Problem: Wound  Goal: Optimal Coping  Outcome: Progressing  Goal: Optimal Functional Ability  Outcome: Progressing  Goal: Absence of Infection Signs and Symptoms  Outcome: Progressing  Goal: Improved Oral Intake  Outcome: Progressing  Goal: Optimal Pain Control and Function  Outcome: Progressing  Goal: Skin Health and Integrity  Outcome: Progressing  Goal: Optimal Wound Healing  Outcome: Progressing     Problem: Infection  Goal: Absence of Infection Signs and Symptoms  Outcome: Progressing     Pt remained free of falls or injuries during shift.

## 2024-09-12 ENCOUNTER — TELEPHONE (OUTPATIENT)
Dept: ENDOSCOPY | Facility: HOSPITAL | Age: 84
End: 2024-09-12
Payer: MEDICARE

## 2024-09-12 VITALS
RESPIRATION RATE: 18 BRPM | WEIGHT: 149.94 LBS | TEMPERATURE: 99 F | BODY MASS INDEX: 22.21 KG/M2 | HEIGHT: 69 IN | HEART RATE: 53 BPM | DIASTOLIC BLOOD PRESSURE: 61 MMHG | SYSTOLIC BLOOD PRESSURE: 133 MMHG | OXYGEN SATURATION: 92 %

## 2024-09-12 LAB
ALBUMIN SERPL BCP-MCNC: 2.3 G/DL (ref 3.5–5.2)
ALP SERPL-CCNC: 124 U/L (ref 55–135)
ALT SERPL W/O P-5'-P-CCNC: 22 U/L (ref 10–44)
ANION GAP SERPL CALC-SCNC: 6 MMOL/L (ref 8–16)
AST SERPL-CCNC: 26 U/L (ref 10–40)
BILIRUB SERPL-MCNC: 0.6 MG/DL (ref 0.1–1)
BUN SERPL-MCNC: 21 MG/DL (ref 8–23)
CALCIUM SERPL-MCNC: 8.5 MG/DL (ref 8.7–10.5)
CHLORIDE SERPL-SCNC: 113 MMOL/L (ref 95–110)
CO2 SERPL-SCNC: 20 MMOL/L (ref 23–29)
CREAT SERPL-MCNC: 2.6 MG/DL (ref 0.5–1.4)
ERYTHROCYTE [DISTWIDTH] IN BLOOD BY AUTOMATED COUNT: 14.7 % (ref 11.5–14.5)
EST. GFR  (NO RACE VARIABLE): 23.9 ML/MIN/1.73 M^2
GLUCOSE SERPL-MCNC: 89 MG/DL (ref 70–110)
HCT VFR BLD AUTO: 34.8 % (ref 40–54)
HGB BLD-MCNC: 11.4 G/DL (ref 14–18)
MCH RBC QN AUTO: 31.3 PG (ref 27–31)
MCHC RBC AUTO-ENTMCNC: 32.8 G/DL (ref 32–36)
MCV RBC AUTO: 96 FL (ref 82–98)
PLATELET # BLD AUTO: 311 K/UL (ref 150–450)
PMV BLD AUTO: 11.4 FL (ref 9.2–12.9)
POTASSIUM SERPL-SCNC: 3.6 MMOL/L (ref 3.5–5.1)
PROT SERPL-MCNC: 6.2 G/DL (ref 6–8.4)
RBC # BLD AUTO: 3.64 M/UL (ref 4.6–6.2)
SODIUM SERPL-SCNC: 139 MMOL/L (ref 136–145)
WBC # BLD AUTO: 7.36 K/UL (ref 3.9–12.7)

## 2024-09-12 PROCEDURE — 25000003 PHARM REV CODE 250: Performed by: STUDENT IN AN ORGANIZED HEALTH CARE EDUCATION/TRAINING PROGRAM

## 2024-09-12 PROCEDURE — 36415 COLL VENOUS BLD VENIPUNCTURE: CPT | Performed by: STUDENT IN AN ORGANIZED HEALTH CARE EDUCATION/TRAINING PROGRAM

## 2024-09-12 PROCEDURE — 85027 COMPLETE CBC AUTOMATED: CPT | Performed by: STUDENT IN AN ORGANIZED HEALTH CARE EDUCATION/TRAINING PROGRAM

## 2024-09-12 PROCEDURE — 63600175 PHARM REV CODE 636 W HCPCS

## 2024-09-12 PROCEDURE — 25000003 PHARM REV CODE 250: Performed by: HOSPITALIST

## 2024-09-12 PROCEDURE — 25000003 PHARM REV CODE 250: Performed by: INTERNAL MEDICINE

## 2024-09-12 PROCEDURE — 25000003 PHARM REV CODE 250: Performed by: NURSE PRACTITIONER

## 2024-09-12 PROCEDURE — A4216 STERILE WATER/SALINE, 10 ML: HCPCS | Performed by: INTERNAL MEDICINE

## 2024-09-12 PROCEDURE — 63600175 PHARM REV CODE 636 W HCPCS: Performed by: STUDENT IN AN ORGANIZED HEALTH CARE EDUCATION/TRAINING PROGRAM

## 2024-09-12 PROCEDURE — 80053 COMPREHEN METABOLIC PANEL: CPT | Performed by: STUDENT IN AN ORGANIZED HEALTH CARE EDUCATION/TRAINING PROGRAM

## 2024-09-12 RX ORDER — SODIUM BICARBONATE 650 MG/1
650 TABLET ORAL DAILY
Qty: 30 TABLET | Refills: 11 | Status: SHIPPED | OUTPATIENT
Start: 2024-09-12 | End: 2025-09-12

## 2024-09-12 RX ORDER — TAMSULOSIN HYDROCHLORIDE 0.4 MG/1
0.8 CAPSULE ORAL DAILY
Qty: 60 CAPSULE | Refills: 11 | Status: SHIPPED | OUTPATIENT
Start: 2024-09-12 | End: 2025-09-12

## 2024-09-12 RX ADMIN — SODIUM BICARBONATE 650 MG TABLET 650 MG: at 09:09

## 2024-09-12 RX ADMIN — ALLOPURINOL 50 MG: 300 TABLET ORAL at 09:09

## 2024-09-12 RX ADMIN — TAMSULOSIN HYDROCHLORIDE 0.8 MG: 0.4 CAPSULE ORAL at 09:09

## 2024-09-12 RX ADMIN — FLUDROCORTISONE ACETATE 100 MCG: 0.1 TABLET ORAL at 06:09

## 2024-09-12 RX ADMIN — HEPARIN SODIUM 5000 UNITS: 5000 INJECTION INTRAVENOUS; SUBCUTANEOUS at 06:09

## 2024-09-12 RX ADMIN — Medication 10 ML: at 06:09

## 2024-09-12 RX ADMIN — Medication 1 CAPSULE: at 09:09

## 2024-09-12 RX ADMIN — Medication 10 ML: at 01:09

## 2024-09-12 RX ADMIN — FINASTERIDE 5 MG: 5 TABLET, FILM COATED ORAL at 09:09

## 2024-09-12 RX ADMIN — PIPERACILLIN SODIUM AND TAZOBACTAM SODIUM 4.5 G: 4; .5 INJECTION, POWDER, FOR SOLUTION INTRAVENOUS at 02:09

## 2024-09-12 NOTE — PLAN OF CARE
[facility] - Telemetry Stepdown  Discharge Final Note    Primary Care Provider: Hali Burrell MD    Expected Discharge Date: 9/12/2024    Patient to be discharged home.  The patient will have home health with [facility].  Family to provide transportation home.    Future Appointments   Date Time Provider Department Center   9/18/2024  1:45 PM Hali Burrell MD [facility]   2/19/2025 11:00 AM Maura Lowery Jr., MD [facility] UROLOGY [place]   6/5/2025 10:20 AM Johnathan Angel MD [facility] CARDIO [place]  [facility]        Final Discharge Note (most recent)       Final Note - 09/12/24 1037          Final Note    Assessment Type Final Discharge Note     Anticipated Discharge Disposition Home-Health Care Svc        Post-Acute Status    Post-Acute Authorization Home Health     Home Health Status Set-up Complete/Auth obtained     Discharge Delays None known at this time                     Important Message from Medicare  Important Message from Medicare regarding Discharge Appeal Rights: Given to patient/caregiver, Explained to patient/caregiver, Signed/date by patient/caregiver     Date IMM was signed: 09/12/24  Time IMM was signed: 0844    Contact [employer]       Home Health    [facility] ([id number] Phone: [phone] [address] [facility] [place] [address]       Next Steps: Follow up    Instructions: Michael MCKEON will call with appointment date and time of appointment.

## 2024-09-12 NOTE — PT/OT/SLP PROGRESS
Occupational Therapy      Patient Name:  Josh Pinto   MRN:  [id number]    Patient not seen today secondary to patient awaiting transport by family in anticipation for discharge on AM attempt (1030). Patient fully dressed and deferred OT services.     9/12/2024

## 2024-09-12 NOTE — PLAN OF CARE
Robbie Sepulveda - Telemetry Stepdown      HOME HEALTH ORDERS  FACE TO FACE ENCOUNTER    Patient Name: Josh Pinto  YOB: 1940    PCP: Hali Burrell MD   PCP Address: [address] [address]  PCP Phone Number: [phone]  PCP Fax: [phone]    Encounter Date: 8/28/24    Admit to Home Health    Diagnoses:  Active Hospital Problems    Diagnosis  POA    *Cholangitis [K83.09]  Yes    Current chronic use of systemic steroids [Z79.52]  Not Applicable    Metabolic acidosis [E87.20]  Yes     -normal anion gap  -in the setting of CKD.  Start daily bicarb oral      Bacteremia [R78.81]  Yes    PAF (paroxysmal atrial fibrillation), RRY4TM9-OAA score 3, decline OAC [I48.0]  Yes    Chronic gout of multiple sites [M1A.09X0]  Yes    Acute hypoxemic respiratory failure [J96.01]  Yes    Stage 4 chronic kidney disease [N18.4]  Yes    Renal cell carcinoma of left kidney, nephrectomy in 8/2023 [C64.2]  Yes    HTN (hypertension), dx 1985 [I10]  Yes    BPH with urinary obstruction [N40.1, N13.8]  Yes      Resolved Hospital Problems   No resolved problems to display.       Follow Up Appointments:  Future Appointments   Date Time Provider Department Center   9/18/2024  1:45 PM Hali Burrell MD [facility]   2/19/2025 11:00 AM Maura Lowery Jr., MD [facility] UROLOGY [place]   6/5/2025 10:20 AM Johnathan Angel MD [facility] CARDIO [facility]       Allergies:  Review of patient's allergies indicates:   Allergen Reactions    Sulfa (sulfonamide antibiotics) Rash       Medications: Review discharge medications with patient and family and provide education.    Current Facility-Administered Medications   Medication Dose Route Frequency Provider Last Rate Last Admin    acetaminophen tablet 650 mg  650 mg Oral Q6H PRN Chula Stokes MD   650 mg at 09/10/24 0208    allopurinol split tablet 50 mg  50 mg Oral Every other day Desire Lyn NP   50 mg at 09/10/24 0912    dextrose 10% bolus  125 mL 125 mL  12.5 g Intravenous PRN Sedrick Ureña MD        dextrose 10% bolus 250 mL 250 mL  25 g Intravenous PRN Sedrick Ureña MD        finasteride tablet 5 mg  5 mg Oral Daily Scott Bee NP   5 mg at 09/11/24 0831    fludrocortisone tablet 100 mcg  100 mcg Oral QAM Scott Bee NP   100 mcg at 09/12/24 0612    heparin (porcine) injection 5,000 Units  5,000 Units Subcutaneous Q8H Helena Goodwin MD   5,000 Units at 09/12/24 0611    hydrALAZINE injection 10 mg  10 mg Intravenous Q6H PRN Chula Stokes MD        Lactobacillus rhamnosus GG capsule 1 capsule  1 capsule Oral Daily Darell Vieira MD   1 capsule at 09/11/24 0831    oxyCODONE immediate release tablet 5 mg  5 mg Oral Q6H PRN David Yi MD   5 mg at 09/05/24 1708    piperacillin-tazobactam (ZOSYN) 4.5 g in D5W 100 mL IVPB (MB+)  4.5 g Intravenous Q12H Chula Stokes MD   Stopped at 09/12/24 0650    polyethylene glycol packet 17 g  17 g Oral Daily PRN Chula Stokes MD        sodium bicarbonate tablet 650 mg  650 mg Oral Daily Chula Stokes MD   650 mg at 09/11/24 0831    sodium chloride 0.9% flush 10 mL  10 mL Intravenous PRN Helena Goodwin MD        sodium chloride 0.9% flush 10 mL  10 mL Intravenous Q6H David Yi MD   10 mL at 09/12/24 0611    And    sodium chloride 0.9% flush 10 mL  10 mL Intravenous PRN David Yi MD        tamsulosin 24 hr capsule 0.8 mg  0.8 mg Oral Daily Chula Stokes MD            Medication List        START taking these medications      sodium bicarbonate 650 MG tablet  Take 1 tablet (650 mg total) by mouth once daily.            CHANGE how you take these medications      * tamsulosin 0.4 mg Cap  Commonly known as: FLOMAX  Take 1 capsule (0.4 mg total) by mouth once daily.  What changed: Another medication with the same name was added. Make sure you understand how and when to take each.     * tamsulosin 0.4 mg Cap  Commonly known as:  FLOMAX  Take 2 capsules (0.8 mg total) by mouth once daily.  What changed: You were already taking a medication with the same name, and this prescription was added. Make sure you understand how and when to take each.           * This list has 2 medication(s) that are the same as other medications prescribed for you. Read the directions carefully, and ask your doctor or other care provider to review them with you.                CONTINUE taking these medications      allopurinoL 100 MG tablet  Commonly known as: ZYLOPRIM  Take a half a tablet every other day due to your chronic kidney disease     colchicine 0.6 mg tablet  Commonly known as: COLCRYS  1.2 mg at the first sign of  gout flare, followed in 1 hour with a single dose of 0.6 mg. repeat treatment should not occur for at least 14 days. Use with caution due to chronic kidney disease     finasteride 5 mg tablet  Commonly known as: PROSCAR  Take 1 tablet (5 mg total) by mouth once daily.     fludrocortisone 0.1 mg Tab  Commonly known as: FLORINEF  Take 100 mcg by mouth every morning.     iron-vitamin C 100-250 mg (ICAR-C) 100-250 mg Tab  Take 1 tablet by mouth every morning.     magnesium oxide 250 mg magnesium Tab  Commonly known as: MAG-OX  SMARTSI caplet By Mouth Every Morning     metoprolol tartrate 25 MG tablet  Commonly known as: LOPRESSOR  Take 12.5 mg by mouth 2 (two) times daily.     pantoprazole 40 MG tablet  Commonly known as: PROTONIX  Take 1 tablet (40 mg total) by mouth once daily.     potassium chloride SA 20 MEQ tablet  Commonly known as: K-DUR,KLOR-CON  Take 20 mEq by mouth every morning.     VITAMIN D3 125 mcg (5,000 unit) Tab  Generic drug: cholecalciferol (vitamin D3)  Take 5,000 Units by mouth once daily.                I have seen and examined this patient within the last 30 days. My clinical findings that support the need for the home health skilled services and home bound status are the following:no   Weakness/numbness causing balance  and gait disturbance due to Surgery making it taxing to leave home.     Diet:   regular diet      Physical Therapy to evaluate and treat. Evaluate for home safety and equipment needs; Establish/upgrade home exercise program. Perform / instruct on therapeutic exercises, gait training, transfer training, and Range of Motion.    Activities:   activity as tolerated    Nursing:   Agency to admit patient within 24 hours of hospital discharge unless specified on physician order or at patient request    SN to complete comprehensive assessment including routine vital signs. Instruct on disease process and s/s of complications to report to MD. Review/verify medication list sent home with the patient at time of discharge  and instruct patient/caregiver as needed. Frequency may be adjusted depending on start of care date.     Skilled nurse to perform up to 3 visits PRN for symptoms related to diagnosis    Notify MD if SBP > 160 or < 90; DBP > 90 or < 50; HR > 120 or < 50; Temp > 101; O2 < 88%; Other:       Ok to schedule additional visits based on staff availability and patient request on consecutive days within the home health episode.    When multiple disciplines ordered:    Start of Care occurs on Sunday - Wednesday schedule remaining discipline evaluations as ordered on separate consecutive days following the start of care.    Thursday SOC -schedule subsequent evaluations Friday and Monday the following week.     Friday - Saturday SOC - schedule subsequent discipline evaluations on consecutive days starting Monday of the following week.    For all post-discharge communication and subsequent orders please contact patient's primary care physician. If unable to reach primary care physician or do not receive response within 30 minutes, please contact PCPfor clinical staff order clarification        I certify that this patient is confined to his home and needs intermittent skilled nursing care.

## 2024-09-12 NOTE — PLAN OF CARE
Patient anticipated to discharge home today with hh.  Referral had been sent to [employer].  Orders sent via [employer].   09/12/24 0902   Post-Acute Status   Post-Acute Authorization Placement;Home Health   Post-Acute Placement Status Discharge Plan Changed   Home Health Status Referrals Sent   Discharge Plan   Discharge Plan A Home with family;Home Health   Discharge Plan B Home with family

## 2024-09-12 NOTE — NURSING
Removed PIV. Patient understands discharge instructions. Family member escorted patient via wheelchair.

## 2024-09-13 PROCEDURE — G0180 MD CERTIFICATION HHA PATIENT: HCPCS | Mod: ,,, | Performed by: FAMILY MEDICINE

## 2024-09-18 NOTE — PHYSICIAN QUERY
"After study, please further specify the "Bacteremia" diagnosis:     Other Infectious Disease (please specify): cholangitis  "

## 2024-10-02 ENCOUNTER — TELEPHONE (OUTPATIENT)
Dept: ENDOSCOPY | Facility: HOSPITAL | Age: 84
End: 2024-10-02
Payer: MEDICARE

## 2024-10-02 NOTE — TELEPHONE ENCOUNTER
Telephone patient and sister to schedule ERCP with no answer. Direct contact left to call back to schedule procedure.

## 2024-10-03 ENCOUNTER — OFFICE VISIT (OUTPATIENT)
Dept: FAMILY MEDICINE | Facility: CLINIC | Age: 84
End: 2024-10-03
Payer: MEDICARE

## 2024-10-03 VITALS
HEIGHT: 69 IN | SYSTOLIC BLOOD PRESSURE: 124 MMHG | BODY MASS INDEX: 22.14 KG/M2 | WEIGHT: 149.5 LBS | OXYGEN SATURATION: 95 % | DIASTOLIC BLOOD PRESSURE: 80 MMHG | HEART RATE: 51 BPM

## 2024-10-03 DIAGNOSIS — N18.4 STAGE 4 CHRONIC KIDNEY DISEASE: ICD-10-CM

## 2024-10-03 DIAGNOSIS — Z90.49 S/P LAPAROSCOPIC CHOLECYSTECTOMY: Primary | ICD-10-CM

## 2024-10-03 PROCEDURE — 3074F SYST BP LT 130 MM HG: CPT | Mod: CPTII,S$GLB,, | Performed by: FAMILY MEDICINE

## 2024-10-03 PROCEDURE — 1111F DSCHRG MED/CURRENT MED MERGE: CPT | Mod: CPTII,S$GLB,, | Performed by: FAMILY MEDICINE

## 2024-10-03 PROCEDURE — 1101F PT FALLS ASSESS-DOCD LE1/YR: CPT | Mod: CPTII,S$GLB,, | Performed by: FAMILY MEDICINE

## 2024-10-03 PROCEDURE — 3079F DIAST BP 80-89 MM HG: CPT | Mod: CPTII,S$GLB,, | Performed by: FAMILY MEDICINE

## 2024-10-03 PROCEDURE — 1125F AMNT PAIN NOTED PAIN PRSNT: CPT | Mod: CPTII,S$GLB,, | Performed by: FAMILY MEDICINE

## 2024-10-03 PROCEDURE — 3288F FALL RISK ASSESSMENT DOCD: CPT | Mod: CPTII,S$GLB,, | Performed by: FAMILY MEDICINE

## 2024-10-03 PROCEDURE — 1159F MED LIST DOCD IN RCRD: CPT | Mod: CPTII,S$GLB,, | Performed by: FAMILY MEDICINE

## 2024-10-03 PROCEDURE — 99214 OFFICE O/P EST MOD 30 MIN: CPT | Mod: S$GLB,,, | Performed by: FAMILY MEDICINE

## 2024-10-03 PROCEDURE — 99999 PR PBB SHADOW E&M-EST. PATIENT-LVL III: CPT | Mod: PBBFAC,,, | Performed by: FAMILY MEDICINE

## 2024-10-03 NOTE — PROGRESS NOTES
Subjective     Patient ID: Josh Pinto is a 84 y.o. male.    Chief Complaint: Hospital Follow Up    Hospital follow up    Was admitted to the hospital in August for abdominal pain had gallbladder removed.  Has since been discharged home.  Currently has home health, home PT in a home health aide following.  States overall his symptoms are stable.  No bowel bladder issues.    CKD stage 4: Following with Nephrology in [place] has upcoming appointment.     No other issues or concerns today.       Review of Systems   Constitutional:  Negative for activity change, appetite change, fatigue and fever.   Respiratory:  Negative for cough, chest tightness, shortness of breath and wheezing.    Cardiovascular:  Negative for chest pain, palpitations, leg swelling and claudication.   Gastrointestinal:  Negative for abdominal pain, constipation and diarrhea.   Psychiatric/Behavioral:  Negative for dysphoric mood, sleep disturbance and suicidal ideas. The patient is not nervous/anxious.           Objective     Physical Exam  Vitals reviewed.   Constitutional:       General: He is not in acute distress.     Appearance: Normal appearance. He is not ill-appearing.   Cardiovascular:      Rate and Rhythm: Normal rate and regular rhythm.      Heart sounds: Normal heart sounds.   Pulmonary:      Effort: Pulmonary effort is normal. No respiratory distress.      Breath sounds: Normal breath sounds.   Abdominal:      General: Bowel sounds are normal.      Palpations: Abdomen is soft.      Tenderness: There is no abdominal tenderness.          Comments: Healing laparoscopic surgical we wounds.  Old midline surgical scar   Musculoskeletal:         General: No swelling or tenderness. Normal range of motion.      Right lower leg: No edema.      Left lower leg: No edema.   Neurological:      General: No focal deficit present.      Mental Status: He is alert and oriented to person, place, and time.   Psychiatric:         Mood and Affect: Mood  normal.         Behavior: Behavior normal.         Thought Content: Thought content normal.            Assessment and Plan     1. S/P laparoscopic cholecystectomy    2. Stage 4 chronic kidney disease      Labs and available hospital records reviewed.  Continue to follow with specialists.  Continue home health services.  All questions were answered to the fullest satisfaction of the patient, and pt verbalized understanding and agreement to treatment plan. Pt was to call with any new or worsening symptoms, or present to the ER.  RTC prn        Hali Burrell MD  Family Medicine Physician   [facility]     This note was created using [employer] voice recognition software that occasionally may misinterpret phrases or words.

## 2024-10-04 ENCOUNTER — TELEPHONE (OUTPATIENT)
Dept: ENDOSCOPY | Facility: HOSPITAL | Age: 84
End: 2024-10-04
Payer: MEDICARE

## 2024-10-07 ENCOUNTER — TELEPHONE (OUTPATIENT)
Dept: ENDOSCOPY | Facility: HOSPITAL | Age: 84
End: 2024-10-07
Payer: MEDICARE

## 2024-10-07 DIAGNOSIS — Z46.89 ENCOUNTER FOR REMOVAL OF BILIARY STENT: Primary | ICD-10-CM

## 2024-10-07 RX ORDER — ALLOPURINOL 100 MG/1
TABLET ORAL
Qty: 90 TABLET | Refills: 3 | Status: SHIPPED | OUTPATIENT
Start: 2024-10-07

## 2024-10-07 NOTE — TELEPHONE ENCOUNTER
Refill Routing Note   Medication(s) are not appropriate for processing by [facility] for the following reason(s):        Required labs abnormal: Cr 2.6       [facility] action(s):  Defer               Appointments  past 12m or future 3m with PCP    Date Provider   Last Visit   10/3/2024 Hali Burrell MD   Next Visit   Visit date not found Hali Burrell MD   ED visits in past 90 days: 1        Note composed:2:42 PM 10/07/2024

## 2024-10-07 NOTE — TELEPHONE ENCOUNTER
Spoke to patient to schedule procedure(s) ERCP        Physician to perform procedure(s) Dr. KAYLA Murguia  Date of Procedure (s) 10/29/2024  Arrival Time 10:00 AM  Time of Procedure(s) 11:00 AM   Location of Procedure(s) [place] 2nd Floor  Type of Rx Prep sent to patient: Other  Instructions provided to patient via [facility]    Patient was informed on the following information and verbalized understanding. Screening questionnaire reviewed with patient and complete. If procedure requires anesthesia, a responsible adult needs to be present to accompany the patient home, patient cannot drive after receiving anesthesia. Appointment details are tentative, especially check-in time. Patient will receive a prep-op call 7 days prior to confirm check-in time for procedure. If applicable the patient should contact their pharmacy to verify Rx for procedure prep is ready for pick-up. Patient was advised to call the scheduling department at [phone] if pharmacy states no Rx is available. Patient was advised to call the endoscopy scheduling department if any questions or concerns arise.      [facility] Scheduling Department

## 2024-10-07 NOTE — TELEPHONE ENCOUNTER
No care due was identified.  [facility] Embedded Care Due Messages. Reference number: [id number].   10/07/2024 8:33:48 AM CDT

## 2024-10-16 ENCOUNTER — PATIENT MESSAGE (OUTPATIENT)
Dept: FAMILY MEDICINE | Facility: CLINIC | Age: 84
End: 2024-10-16
Payer: MEDICARE

## 2024-10-22 ENCOUNTER — TELEPHONE (OUTPATIENT)
Dept: GASTROENTEROLOGY | Facility: CLINIC | Age: 84
End: 2024-10-22
Payer: MEDICARE

## 2024-10-22 NOTE — TELEPHONE ENCOUNTER
Spoke to MR SAWYER for pre-call to confirm scheduled ERCP  and patient verbalized understanding of the following:       Date of Procedure (s)  verified 10/29/2024  Arrival Time 10:00 AM verified.  Location of Procedure(s) [place] 2nd Floor verified.  NPO status reinforced. Ok to continue clear liquids up until 2 hours prior to the Endoscopy procedure.     Pt confirmed receipt of prep instructions and Rx prep (if applicable).  Instructions provided to patient via [employer]    Pt confirmed ride home after procedure if procedure requires anesthesia.-PATIENT STATED THAT HE IS NOT SURE HE WILL HAVE A RIDE. I STRESSED THAT IF HE DOES NOT HAVE A RIDE, WE WILL NEED TO RESCHEDULE. HE VERBALIZED UNDERSTANDING.    Pre-call screening questionnaire reviewed and completed with patient.   Appointment details are tentative, including check-in time.  If the patient begins taking any blood thinning medications, injectable weight loss/diabetes medications (other than insulin), or Adipex (phentermine) patient was instructed to contact the endoscopy scheduling department as soon as possible.  Patient was advised to call the endoscopy scheduling department if any questions or concerns arise.       [facility] Scheduling Department

## 2024-10-29 ENCOUNTER — HOSPITAL ENCOUNTER (OUTPATIENT)
Facility: HOSPITAL | Age: 84
Discharge: HOME | End: 2024-10-29
Attending: INTERNAL MEDICINE | Admitting: INTERNAL MEDICINE
Payer: MEDICARE

## 2024-10-29 ENCOUNTER — ANESTHESIA EVENT (OUTPATIENT)
Dept: ENDOSCOPY | Facility: HOSPITAL | Age: 84
End: 2024-10-29
Payer: MEDICARE

## 2024-10-29 ENCOUNTER — ANESTHESIA (OUTPATIENT)
Dept: ENDOSCOPY | Facility: HOSPITAL | Age: 84
End: 2024-10-29
Payer: MEDICARE

## 2024-10-29 VITALS
SYSTOLIC BLOOD PRESSURE: 105 MMHG | TEMPERATURE: 99 F | RESPIRATION RATE: 15 BRPM | HEART RATE: 63 BPM | HEIGHT: 69 IN | DIASTOLIC BLOOD PRESSURE: 62 MMHG | OXYGEN SATURATION: 95 % | WEIGHT: 149 LBS | BODY MASS INDEX: 22.07 KG/M2

## 2024-10-29 DIAGNOSIS — Z90.49 S/P LAPAROSCOPIC CHOLECYSTECTOMY: Primary | ICD-10-CM

## 2024-10-29 PROCEDURE — 37000008 HC ANESTHESIA 1ST 15 MINUTES: Performed by: INTERNAL MEDICINE

## 2024-10-29 PROCEDURE — 37000009 HC ANESTHESIA EA ADD 15 MINS: Performed by: INTERNAL MEDICINE

## 2024-10-29 PROCEDURE — 43275 ERCP REMOVE FORGN BODY DUCT: CPT | Mod: ,,, | Performed by: INTERNAL MEDICINE

## 2024-10-29 PROCEDURE — 25500020 PHARM REV CODE 255: Performed by: INTERNAL MEDICINE

## 2024-10-29 PROCEDURE — 43264 ERCP REMOVE DUCT CALCULI: CPT | Mod: ,,, | Performed by: INTERNAL MEDICINE

## 2024-10-29 PROCEDURE — 74328 X-RAY BILE DUCT ENDOSCOPY: CPT | Mod: TC | Performed by: INTERNAL MEDICINE

## 2024-10-29 PROCEDURE — 63600175 PHARM REV CODE 636 W HCPCS: Performed by: NURSE ANESTHETIST, CERTIFIED REGISTERED

## 2024-10-29 PROCEDURE — 74328 X-RAY BILE DUCT ENDOSCOPY: CPT | Mod: 26,,, | Performed by: INTERNAL MEDICINE

## 2024-10-29 PROCEDURE — 27202125 HC BALLOON, EXTRACTION (ANY): Performed by: INTERNAL MEDICINE

## 2024-10-29 PROCEDURE — 43275 ERCP REMOVE FORGN BODY DUCT: CPT | Performed by: INTERNAL MEDICINE

## 2024-10-29 PROCEDURE — C1769 GUIDE WIRE: HCPCS | Performed by: INTERNAL MEDICINE

## 2024-10-29 PROCEDURE — 43264 ERCP REMOVE DUCT CALCULI: CPT | Performed by: INTERNAL MEDICINE

## 2024-10-29 PROCEDURE — 25000003 PHARM REV CODE 250: Performed by: NURSE ANESTHETIST, CERTIFIED REGISTERED

## 2024-10-29 PROCEDURE — 27201089 HC SNARE, DISP (ANY): Performed by: INTERNAL MEDICINE

## 2024-10-29 RX ORDER — HYDROMORPHONE HYDROCHLORIDE 1 MG/ML
0.2 INJECTION, SOLUTION INTRAMUSCULAR; INTRAVENOUS; SUBCUTANEOUS EVERY 5 MIN PRN
Status: DISCONTINUED | OUTPATIENT
Start: 2024-10-29 | End: 2024-10-29 | Stop reason: HOSPADM

## 2024-10-29 RX ORDER — SODIUM CHLORIDE 0.9 % (FLUSH) 0.9 %
10 SYRINGE (ML) INJECTION
Status: DISCONTINUED | OUTPATIENT
Start: 2024-10-29 | End: 2024-10-29 | Stop reason: HOSPADM

## 2024-10-29 RX ORDER — GLUCAGON 1 MG
1 KIT INJECTION
Status: DISCONTINUED | OUTPATIENT
Start: 2024-10-29 | End: 2024-10-29 | Stop reason: HOSPADM

## 2024-10-29 RX ORDER — HALOPERIDOL 5 MG/ML
0.5 INJECTION INTRAMUSCULAR EVERY 10 MIN PRN
Status: DISCONTINUED | OUTPATIENT
Start: 2024-10-29 | End: 2024-10-29 | Stop reason: HOSPADM

## 2024-10-29 RX ORDER — SODIUM CHLORIDE 9 MG/ML
INJECTION, SOLUTION INTRAVENOUS CONTINUOUS
Status: DISCONTINUED | OUTPATIENT
Start: 2024-10-29 | End: 2024-10-29 | Stop reason: HOSPADM

## 2024-10-29 RX ORDER — PROPOFOL 10 MG/ML
VIAL (ML) INTRAVENOUS CONTINUOUS PRN
Status: DISCONTINUED | OUTPATIENT
Start: 2024-10-29 | End: 2024-10-29

## 2024-10-29 RX ORDER — LIDOCAINE HYDROCHLORIDE 20 MG/ML
INJECTION INTRAVENOUS
Status: DISCONTINUED | OUTPATIENT
Start: 2024-10-29 | End: 2024-10-29

## 2024-10-29 RX ADMIN — SODIUM CHLORIDE: 9 INJECTION, SOLUTION INTRAVENOUS at 11:10

## 2024-10-29 RX ADMIN — LIDOCAINE HYDROCHLORIDE 100 MG: 20 INJECTION INTRAVENOUS at 11:10

## 2024-10-29 RX ADMIN — PROPOFOL 175 MCG/KG/MIN: 10 INJECTION, EMULSION INTRAVENOUS at 11:10

## 2024-11-10 ENCOUNTER — EXTERNAL HOME HEALTH (OUTPATIENT)
Dept: HOME HEALTH SERVICES | Facility: HOSPITAL | Age: 84
End: 2024-11-10
Payer: MEDICARE

## 2024-11-16 NOTE — TELEPHONE ENCOUNTER
No care due was identified.  Health [facility] Embedded Care Due Messages. Reference number: [id number].   11/16/2024 2:27:40 PM CST

## 2024-11-17 RX ORDER — COLCHICINE 0.6 MG/1
TABLET ORAL
Qty: 90 TABLET | Refills: 2 | Status: SHIPPED | OUTPATIENT
Start: 2024-11-17

## 2024-11-17 NOTE — TELEPHONE ENCOUNTER
Refill Decision Note   Josh Drakemarc  is requesting a refill authorization.  Brief Assessment and Rationale for Refill:  Approve     Medication Therapy Plan:       Medication Reconciliation Completed: No   Comments:     No Care Gaps recommended.     Note composed:7:45 AM 11/17/2024

## 2024-12-09 ENCOUNTER — OFFICE VISIT (OUTPATIENT)
Dept: FAMILY MEDICINE | Facility: CLINIC | Age: 84
End: 2024-12-09
Payer: MEDICARE

## 2024-12-09 VITALS
BODY MASS INDEX: 23.62 KG/M2 | DIASTOLIC BLOOD PRESSURE: 80 MMHG | TEMPERATURE: 98 F | HEIGHT: 69 IN | HEART RATE: 76 BPM | WEIGHT: 159.5 LBS | OXYGEN SATURATION: 96 % | SYSTOLIC BLOOD PRESSURE: 120 MMHG

## 2024-12-09 DIAGNOSIS — Z87.891 FORMER SMOKER: ICD-10-CM

## 2024-12-09 DIAGNOSIS — Z11.52 ENCOUNTER FOR SCREENING FOR COVID-19: ICD-10-CM

## 2024-12-09 DIAGNOSIS — J30.9 ALLERGIC RHINITIS, UNSPECIFIED SEASONALITY, UNSPECIFIED TRIGGER: Primary | ICD-10-CM

## 2024-12-09 LAB
CTP QC/QA: YES
CTP QC/QA: YES
POC MOLECULAR INFLUENZA A AGN: NEGATIVE
POC MOLECULAR INFLUENZA B AGN: NEGATIVE
SARS-COV-2 RDRP RESP QL NAA+PROBE: NEGATIVE

## 2024-12-09 PROCEDURE — 3074F SYST BP LT 130 MM HG: CPT | Mod: CPTII,S$GLB,, | Performed by: FAMILY MEDICINE

## 2024-12-09 PROCEDURE — 99999 PR PBB SHADOW E&M-EST. PATIENT-LVL III: CPT | Mod: PBBFAC,,, | Performed by: FAMILY MEDICINE

## 2024-12-09 PROCEDURE — 99213 OFFICE O/P EST LOW 20 MIN: CPT | Mod: S$GLB,,, | Performed by: FAMILY MEDICINE

## 2024-12-09 PROCEDURE — 87502 INFLUENZA DNA AMP PROBE: CPT | Mod: QW,S$GLB,, | Performed by: FAMILY MEDICINE

## 2024-12-09 PROCEDURE — 1126F AMNT PAIN NOTED NONE PRSNT: CPT | Mod: CPTII,S$GLB,, | Performed by: FAMILY MEDICINE

## 2024-12-09 PROCEDURE — 1101F PT FALLS ASSESS-DOCD LE1/YR: CPT | Mod: CPTII,S$GLB,, | Performed by: FAMILY MEDICINE

## 2024-12-09 PROCEDURE — 3079F DIAST BP 80-89 MM HG: CPT | Mod: CPTII,S$GLB,, | Performed by: FAMILY MEDICINE

## 2024-12-09 PROCEDURE — 3288F FALL RISK ASSESSMENT DOCD: CPT | Mod: CPTII,S$GLB,, | Performed by: FAMILY MEDICINE

## 2024-12-09 PROCEDURE — 87635 SARS-COV-2 COVID-19 AMP PRB: CPT | Mod: QW,S$GLB,, | Performed by: FAMILY MEDICINE

## 2024-12-09 RX ORDER — CETIRIZINE HYDROCHLORIDE 10 MG/1
10 TABLET ORAL NIGHTLY
Qty: 30 TABLET | Refills: 0 | Status: SHIPPED | OUTPATIENT
Start: 2024-12-09

## 2024-12-09 RX ORDER — FLUTICASONE PROPIONATE 50 MCG
2 SPRAY, SUSPENSION (ML) NASAL DAILY
Qty: 16 G | Refills: 1 | Status: SHIPPED | OUTPATIENT
Start: 2024-12-09

## 2024-12-09 NOTE — PROGRESS NOTES
Subjective     Patient ID: Josh Pinto is a 84 y.o. male.    Chief Complaint: Nasal Congestion (X  2 weeks. )    2 wk history of runny nose. No fever or chills. Not taking anything for symptoms. No URI symptoms.       Review of Systems   Constitutional:  Negative for activity change, appetite change, fatigue and fever.   HENT:  Positive for rhinorrhea.    Respiratory:  Negative for cough, chest tightness, shortness of breath and wheezing.    Cardiovascular:  Negative for chest pain, palpitations, leg swelling and claudication.   Gastrointestinal:  Negative for abdominal pain, constipation and diarrhea.   Psychiatric/Behavioral:  Negative for dysphoric mood, sleep disturbance and suicidal ideas. The patient is not nervous/anxious.           Objective     Physical Exam  Vitals reviewed.   Constitutional:       General: He is not in acute distress.     Appearance: Normal appearance. He is not ill-appearing.   HENT:      Right Ear: Tympanic membrane normal.      Left Ear: Tympanic membrane normal.      Nose: Rhinorrhea present. No congestion.      Mouth/Throat:      Pharynx: No oropharyngeal exudate or posterior oropharyngeal erythema.   Cardiovascular:      Rate and Rhythm: Normal rate and regular rhythm.      Heart sounds: Normal heart sounds.   Pulmonary:      Effort: Pulmonary effort is normal. No respiratory distress.      Breath sounds: Normal breath sounds.   Abdominal:      General: Bowel sounds are normal.      Palpations: Abdomen is soft.      Tenderness: There is no abdominal tenderness.   Musculoskeletal:         General: No swelling or tenderness. Normal range of motion.      Right lower leg: No edema.      Left lower leg: No edema.   Neurological:      General: No focal deficit present.      Mental Status: He is alert and oriented to person, place, and time.   Psychiatric:         Mood and Affect: Mood normal.         Behavior: Behavior normal.         Thought Content: Thought content normal.             Assessment and Plan     1. Allergic rhinitis, unspecified seasonality, unspecified trigger  -     POCT Influenza A/B Molecular    2. Encounter for screening for COVID-19  -     POCT COVID-19 Rapid Screening    3. Former smoker    Other orders  -     fluticasone propionate (FLONASE) 50 mcg/actuation nasal spray; 2 sprays (100 mcg total) by Each Nostril route once daily.  Dispense: 16 g; Refill: 1  -     cetirizine (ZYRTEC) 10 MG tablet; Take 1 tablet (10 mg total) by mouth every evening.  Dispense: 30 tablet; Refill: 0    Flu and COVID neg.   Trial of zyrtec and flonase. Advised to use for 2 wks.   Risks, benefits, and side effects were discussed with the patient. All questions were answered to the fullest satisfaction of the patient, and pt verbalized understanding and agreement to treatment plan. Pt was to call with any new or worsening symptoms, or present to the ER.         Hali Burrell MD  Family Medicine Physician   [facility]     This note was created using [employer] voice recognition software that occasionally may misinterpret phrases or words.

## 2024-12-31 NOTE — TELEPHONE ENCOUNTER
No care due was identified.  Health [facility] Embedded Care Due Messages. Reference number: [id number].   12/31/2024 11:32:14 AM CST

## 2024-12-31 NOTE — TELEPHONE ENCOUNTER
Refill Routing Note   Medication(s) are not appropriate for processing by [facility] for the following reason(s):        New or recently adjusted medication    [facility] action(s):  Defer             Appointments  past 12m or future 3m with PCP    Date Provider   Last Visit   12/9/2024 Hali Burrell MD   Next Visit   3/10/2025 Hali Burrell MD   ED visits in past 90 days: 0        Note composed:1:11 PM 12/31/2024

## 2025-01-02 ENCOUNTER — LAB VISIT (OUTPATIENT)
Dept: LAB | Facility: HOSPITAL | Age: 85
End: 2025-01-02
Attending: INTERNAL MEDICINE
Payer: MEDICARE

## 2025-01-02 DIAGNOSIS — N25.81 SECONDARY HYPERPARATHYROIDISM OF RENAL ORIGIN: ICD-10-CM

## 2025-01-02 DIAGNOSIS — E83.42 HYPOMAGNESEMIA: Primary | ICD-10-CM

## 2025-01-02 LAB
ALBUMIN SERPL BCP-MCNC: 3.6 G/DL (ref 3.5–5.2)
ALBUMIN/CREAT UR: 105.8 UG/MG (ref 0–30)
ANION GAP SERPL CALC-SCNC: 10 MMOL/L (ref 8–16)
BACTERIA #/AREA URNS HPF: NORMAL /HPF
BASOPHILS # BLD AUTO: 0.07 K/UL (ref 0–0.2)
BASOPHILS NFR BLD: 1 % (ref 0–1.9)
BILIRUB UR QL STRIP: NEGATIVE
BUN SERPL-MCNC: 51 MG/DL (ref 8–23)
CALCIUM SERPL-MCNC: 9.8 MG/DL (ref 8.7–10.5)
CHLORIDE SERPL-SCNC: 109 MMOL/L (ref 95–110)
CLARITY UR: CLEAR
CO2 SERPL-SCNC: 23 MMOL/L (ref 23–29)
COLOR UR: YELLOW
CREAT SERPL-MCNC: 2.8 MG/DL (ref 0.5–1.4)
CREAT UR-MCNC: 103 MG/DL (ref 23–375)
DIFFERENTIAL METHOD BLD: NORMAL
EOSINOPHIL # BLD AUTO: 0.3 K/UL (ref 0–0.5)
EOSINOPHIL NFR BLD: 4.5 % (ref 0–8)
ERYTHROCYTE [DISTWIDTH] IN BLOOD BY AUTOMATED COUNT: 14.5 % (ref 11.5–14.5)
EST. GFR  (NO RACE VARIABLE): 21.8 ML/MIN/1.73 M^2
GLUCOSE SERPL-MCNC: 82 MG/DL (ref 70–110)
GLUCOSE UR QL STRIP: NEGATIVE
HCT VFR BLD AUTO: 46.7 % (ref 40–54)
HGB BLD-MCNC: 15.1 G/DL (ref 14–18)
HGB UR QL STRIP: NEGATIVE
HYALINE CASTS #/AREA URNS LPF: 0 /LPF
IMM GRANULOCYTES # BLD AUTO: 0.01 K/UL (ref 0–0.04)
IMM GRANULOCYTES NFR BLD AUTO: 0.1 % (ref 0–0.5)
KETONES UR QL STRIP: NEGATIVE
LEUKOCYTE ESTERASE UR QL STRIP: NEGATIVE
LYMPHOCYTES # BLD AUTO: 1.3 K/UL (ref 1–4.8)
LYMPHOCYTES NFR BLD: 18.3 % (ref 18–48)
MAGNESIUM SERPL-MCNC: 2 MG/DL (ref 1.6–2.6)
MCH RBC QN AUTO: 30.7 PG (ref 27–31)
MCHC RBC AUTO-ENTMCNC: 32.3 G/DL (ref 32–36)
MCV RBC AUTO: 95 FL (ref 82–98)
MICROALBUMIN UR DL<=1MG/L-MCNC: 109 UG/ML
MICROSCOPIC COMMENT: NORMAL
MONOCYTES # BLD AUTO: 0.8 K/UL (ref 0.3–1)
MONOCYTES NFR BLD: 11.5 % (ref 4–15)
NEUTROPHILS # BLD AUTO: 4.6 K/UL (ref 1.8–7.7)
NEUTROPHILS NFR BLD: 64.6 % (ref 38–73)
NITRITE UR QL STRIP: NEGATIVE
NRBC BLD-RTO: 0 /100 WBC
PH UR STRIP: 6 [PH] (ref 5–8)
PHOSPHATE SERPL-MCNC: 3.4 MG/DL (ref 2.7–4.5)
PLATELET # BLD AUTO: 200 K/UL (ref 150–450)
PMV BLD AUTO: 11.3 FL (ref 9.2–12.9)
POTASSIUM SERPL-SCNC: 4.7 MMOL/L (ref 3.5–5.1)
PROT UR QL STRIP: ABNORMAL
PTH-INTACT SERPL-MCNC: 47.1 PG/ML (ref 9–77)
RBC # BLD AUTO: 4.92 M/UL (ref 4.6–6.2)
RBC #/AREA URNS HPF: 2 /HPF (ref 0–4)
SODIUM SERPL-SCNC: 142 MMOL/L (ref 136–145)
SP GR UR STRIP: 1.01 (ref 1–1.03)
SQUAMOUS #/AREA URNS HPF: 2 /HPF
URATE SERPL-MCNC: 9 MG/DL (ref 3.4–7)
URN SPEC COLLECT METH UR: ABNORMAL
UROBILINOGEN UR STRIP-ACNC: NEGATIVE EU/DL
WBC # BLD AUTO: 7.05 K/UL (ref 3.9–12.7)
WBC #/AREA URNS HPF: 3 /HPF (ref 0–5)

## 2025-01-02 PROCEDURE — 85025 COMPLETE CBC W/AUTO DIFF WBC: CPT | Performed by: INTERNAL MEDICINE

## 2025-01-02 PROCEDURE — 80069 RENAL FUNCTION PANEL: CPT | Performed by: INTERNAL MEDICINE

## 2025-01-02 PROCEDURE — 82570 ASSAY OF URINE CREATININE: CPT | Performed by: INTERNAL MEDICINE

## 2025-01-02 PROCEDURE — 83970 ASSAY OF PARATHORMONE: CPT | Performed by: INTERNAL MEDICINE

## 2025-01-02 PROCEDURE — 83735 ASSAY OF MAGNESIUM: CPT | Performed by: INTERNAL MEDICINE

## 2025-01-02 PROCEDURE — 36415 COLL VENOUS BLD VENIPUNCTURE: CPT | Performed by: INTERNAL MEDICINE

## 2025-01-02 PROCEDURE — 84550 ASSAY OF BLOOD/URIC ACID: CPT | Performed by: INTERNAL MEDICINE

## 2025-01-02 PROCEDURE — 81000 URINALYSIS NONAUTO W/SCOPE: CPT | Performed by: INTERNAL MEDICINE

## 2025-01-02 PROCEDURE — 82306 VITAMIN D 25 HYDROXY: CPT | Performed by: INTERNAL MEDICINE

## 2025-01-03 LAB — 25(OH)D3+25(OH)D2 SERPL-MCNC: 68 NG/ML (ref 30–96)

## 2025-01-03 RX ORDER — CETIRIZINE HYDROCHLORIDE 10 MG/1
10 TABLET ORAL NIGHTLY
Qty: 90 TABLET | Refills: 1 | Status: SHIPPED | OUTPATIENT
Start: 2025-01-03

## 2025-01-31 RX ORDER — FLUTICASONE PROPIONATE 50 MCG
2 SPRAY, SUSPENSION (ML) NASAL
Qty: 48 ML | Refills: 11 | Status: SHIPPED | OUTPATIENT
Start: 2025-01-31

## 2025-01-31 NOTE — TELEPHONE ENCOUNTER
Refill Routing Note   Medication(s) are not appropriate for processing by [facility] for the following reason(s):        New or recently adjusted medication    [facility] action(s):  Defer             Appointments  past 12m or future 3m with PCP    Date Provider   Last Visit   12/9/2024 Hali Burrell MD   Next Visit   3/10/2025 Hali Burrell MD   ED visits in past 90 days: 0        Note composed:10:59 AM 01/31/2025

## 2025-01-31 NOTE — TELEPHONE ENCOUNTER
No care due was identified.  Health [facility] Embedded Care Due Messages. Reference number: [id number].   1/31/2025 10:33:52 AM CST

## 2025-02-17 ENCOUNTER — OFFICE VISIT (OUTPATIENT)
Dept: PODIATRY | Facility: CLINIC | Age: 85
End: 2025-02-17
Payer: MEDICARE

## 2025-02-17 VITALS — WEIGHT: 166 LBS | BODY MASS INDEX: 24.59 KG/M2 | HEIGHT: 69 IN

## 2025-02-17 DIAGNOSIS — M20.41 HAMMER TOE OF RIGHT FOOT: ICD-10-CM

## 2025-02-17 DIAGNOSIS — L84 CORN OR CALLUS: ICD-10-CM

## 2025-02-17 DIAGNOSIS — L60.9 DISEASE OF NAIL: ICD-10-CM

## 2025-02-17 DIAGNOSIS — M79.671 ACUTE PAIN OF RIGHT FOOT: ICD-10-CM

## 2025-02-17 DIAGNOSIS — I73.9 PERIPHERAL VASCULAR DISEASE: Primary | ICD-10-CM

## 2025-02-17 PROCEDURE — 11055 PARING/CUTG B9 HYPRKER LES 1: CPT | Mod: Q9,S$GLB,, | Performed by: PODIATRIST

## 2025-02-17 PROCEDURE — 11721 DEBRIDE NAIL 6 OR MORE: CPT | Mod: 59,Q9,S$GLB, | Performed by: PODIATRIST

## 2025-02-17 PROCEDURE — 1159F MED LIST DOCD IN RCRD: CPT | Mod: CPTII,S$GLB,, | Performed by: PODIATRIST

## 2025-02-17 PROCEDURE — 99203 OFFICE O/P NEW LOW 30 MIN: CPT | Mod: 25,S$GLB,, | Performed by: PODIATRIST

## 2025-02-17 PROCEDURE — 1126F AMNT PAIN NOTED NONE PRSNT: CPT | Mod: CPTII,S$GLB,, | Performed by: PODIATRIST

## 2025-02-17 PROCEDURE — 1160F RVW MEDS BY RX/DR IN RCRD: CPT | Mod: CPTII,S$GLB,, | Performed by: PODIATRIST

## 2025-02-17 RX ORDER — PREDNISOLONE ACETATE 10 MG/ML
1 SUSPENSION/ DROPS OPHTHALMIC 4 TIMES DAILY
COMMUNITY
Start: 2025-01-11 | End: 2025-03-10

## 2025-02-17 RX ORDER — MOXIFLOXACIN 5 MG/ML
1 SOLUTION/ DROPS OPHTHALMIC 4 TIMES DAILY
COMMUNITY
Start: 2025-01-11 | End: 2025-03-10

## 2025-03-10 ENCOUNTER — OFFICE VISIT (OUTPATIENT)
Dept: FAMILY MEDICINE | Facility: CLINIC | Age: 85
End: 2025-03-10
Payer: MEDICARE

## 2025-03-10 VITALS
SYSTOLIC BLOOD PRESSURE: 128 MMHG | OXYGEN SATURATION: 94 % | WEIGHT: 164.81 LBS | HEART RATE: 55 BPM | DIASTOLIC BLOOD PRESSURE: 86 MMHG | HEIGHT: 69 IN | BODY MASS INDEX: 24.41 KG/M2

## 2025-03-10 DIAGNOSIS — M1A.09X0 CHRONIC GOUT OF MULTIPLE SITES, UNSPECIFIED CAUSE: ICD-10-CM

## 2025-03-10 DIAGNOSIS — Z90.5 SINGLE KIDNEY: ICD-10-CM

## 2025-03-10 DIAGNOSIS — N18.4 STAGE 4 CHRONIC KIDNEY DISEASE: Primary | ICD-10-CM

## 2025-03-10 DIAGNOSIS — H35.3110 NONEXUDATIVE AGE-RELATED MACULAR DEGENERATION OF RIGHT EYE, UNSPECIFIED STAGE: ICD-10-CM

## 2025-03-10 PROCEDURE — 3079F DIAST BP 80-89 MM HG: CPT | Mod: CPTII,S$GLB,, | Performed by: FAMILY MEDICINE

## 2025-03-10 PROCEDURE — 1159F MED LIST DOCD IN RCRD: CPT | Mod: CPTII,S$GLB,, | Performed by: FAMILY MEDICINE

## 2025-03-10 PROCEDURE — 99214 OFFICE O/P EST MOD 30 MIN: CPT | Mod: S$GLB,,, | Performed by: FAMILY MEDICINE

## 2025-03-10 PROCEDURE — 3074F SYST BP LT 130 MM HG: CPT | Mod: CPTII,S$GLB,, | Performed by: FAMILY MEDICINE

## 2025-03-10 PROCEDURE — G2211 COMPLEX E/M VISIT ADD ON: HCPCS | Mod: S$GLB,,, | Performed by: FAMILY MEDICINE

## 2025-03-10 PROCEDURE — 1101F PT FALLS ASSESS-DOCD LE1/YR: CPT | Mod: CPTII,S$GLB,, | Performed by: FAMILY MEDICINE

## 2025-03-10 PROCEDURE — 3288F FALL RISK ASSESSMENT DOCD: CPT | Mod: CPTII,S$GLB,, | Performed by: FAMILY MEDICINE

## 2025-03-10 PROCEDURE — 1126F AMNT PAIN NOTED NONE PRSNT: CPT | Mod: CPTII,S$GLB,, | Performed by: FAMILY MEDICINE

## 2025-03-10 PROCEDURE — 99999 PR PBB SHADOW E&M-EST. PATIENT-LVL III: CPT | Mod: PBBFAC,,, | Performed by: FAMILY MEDICINE

## 2025-03-10 NOTE — PROGRESS NOTES
Subjective:     Patient ID: oJsh Pinto is a 84 y.o. male    Chief Complaint: Foot Problem       Josh is a 84 y.o. male who presents to the clinic for evaluation and treatment of high risk feet. Josh has a past medical history of Arthritis. The patient's chief complaint is long, thick toenails. This patient has documented high risk feet requiring routine maintenance secondary to peripheral vascular disease.    PCP: Hali Burrell MD    Date Last Seen by PCP:  12/09/2024    Current shoe gear:  Affected Foot: Slip-on shoes     Unaffected Foot: Slip-on shoes    Last encounter in this department: Visit date not found    Hemoglobin A1C   Date Value Ref Range Status   02/09/2015 6.2 4.5 - 6.2 % Final       Review of Systems   Constitutional: Negative.  Negative for chills and fever.   Respiratory:  Negative for cough and shortness of breath.    Cardiovascular:  Positive for leg swelling. Negative for chest pain.   Gastrointestinal:  Negative for diarrhea, nausea and vomiting.   Neurological:  Positive for tingling.        Objective:   Physical Exam  Vitals reviewed.   Constitutional:       General: He is not in acute distress.     Appearance: Normal appearance. He is not ill-appearing.   HENT:      Head: Normocephalic.      Nose: Nose normal.   Cardiovascular:      Pulses:           Dorsalis pedis pulses are 1+ on the right side and 1+ on the left side.        Posterior tibial pulses are 1+ on the right side and 1+ on the left side.   Pulmonary:      Effort: Pulmonary effort is normal. No respiratory distress.   Feet:      Right foot:      Skin integrity: Callus (plantar right 5th MPJ) present.   Skin:     Capillary Refill: Capillary refill takes 2 to 3 seconds.   Neurological:      Mental Status: He is alert and oriented to person, place, and time.   Psychiatric:         Mood and Affect: Mood normal.         Behavior: Behavior normal.         Thought Content: Thought content normal.        Foot  Exam    General  Orientation: alert and oriented to person, place, and time   Affect: appropriate   Assistance: wheelchair use       Right Foot/Ankle     Inspection and Palpation  Tenderness: lesser metatarsophalangeal joints (Plantar right 5th MPJ)  Swelling: dorsum   Skin Exam: callus (plantar right 5th MPJ), skin changes and abnormal color;     Neurovascular  Dorsalis pedis: 1+  Posterior tibial: 1+    Muscle Strength  Ankle dorsiflexion: 4  Ankle plantar flexion: 4  Ankle inversion: 4  Ankle eversion: 4      Left Foot/Ankle      Inspection and Palpation  Swelling: dorsum   Skin Exam: skin changes and abnormal color;     Neurovascular  Dorsalis pedis: 1+  Posterior tibial: 1+    Muscle Strength  Ankle dorsiflexion: 4  Ankle plantar flexion: 4  Ankle inversion: 4  Ankle eversion: 4      Dermatologic: Nails 1 through 5 bilateral thickened elongated discolored with subungual debris, soft tissue atrophic skin changes noted bilateral foot including hyperpigmentation   Vascular: +1 nonpitting edema noted bilateral ankle, pedal hair growth is absent bilateral lower extremity   Neurologic: Positive paresthesias reported    Assessment:         1. Peripheral vascular disease    2. Corn or callus    3. Hammer toe of right foot    4. Acute pain of right foot    5. Disease of nail       Plan:     Josh Pinto was seen today for   Chief Complaint   Patient presents with    Foot Problem       Assessment & Plan           Routine Foot Care    Date/Time: 2/17/2025 1:00 PM    Performed by: Pau Burrell DPM  Authorized by: Pau Burrell DPM    Consent Done?:  Yes (Verbal)  Hyperkeratotic Skin Lesions?: Yes    Number of trimmed lesions:  1  Location(s):  Right 5th Metatarsal Head    Nail Care Type:  Debride  Location(s): All  (Left 1st Toe, Left 3rd Toe, Left 2nd Toe, Left 4th Toe, Left 5th Toe, Right 1st Toe, Right 2nd Toe, Right 3rd Toe, Right 4th Toe and Right 5th Toe)  Patient tolerance:  Patient tolerated the  procedure well with no immediate complications       1. Patient was examined and evaluated.    2. Discussed with patient etiology of callus formation.  Patient was warned of potential recurrence over time.  Patient was dispensed offloading pads for reduction of direct contact to the lesion.  Patient was advised to perform safe debridement at home with a emery board, nail file or pumice stone.  Patient was advised to apply ointments / moisturizer to the area for softening purposes.  3. Discussed with patient the etiology of nail fungus and as possible secondary issue to prior nail trauma.  Discussed with patient OTC topical conservative treatments such as Vicks vapor rub, tea tree oil as well as coconut oil.  Discussed with patient risks and benefits oral Lamisil therapy.   4. Discussed with patient etiology of peripheral vascular disease.  Patient was advised elevate lower extremity rest consider daily use of compression stockings.  Patient will monitor dietary salt intake and water consumption.    5. Patient will follow-up in 2-3 months or p.r.n. foot complaints      Don Burrell DPM  [address] [place] [address]  [phone]      This note was created using [email or web address] direct voice recognition software. Note may have occasional typographical errors that may not have been identified and edited despite initial review prior to signing.

## 2025-03-10 NOTE — PROGRESS NOTES
Subjective     Patient ID: Josh Pinto is a 84 y.o. male.    Chief Complaint: Follow-up    Visit today included increased complexity associated with the care of the episodic problem listed below addressed and managing the longitudinal care of the patient due to the serious and/or complex managed problem(s) listed below.       CKD stage 4: still following with nephrology has appointment Wed.   BPH: still follows with Urology.   Gout: no recent flares.   HTN: follows yearly with cardiology.     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 Now seeing retinal specialist.       Review of Systems   Constitutional:  Negative for activity change, appetite change, fatigue and fever.   Respiratory:  Negative for cough, chest tightness, shortness of breath and wheezing.    Cardiovascular:  Negative for chest pain, palpitations, leg swelling and claudication.   Gastrointestinal:  Negative for abdominal pain, constipation and diarrhea.   Psychiatric/Behavioral:  Negative for dysphoric mood, sleep disturbance and suicidal ideas. The patient is not nervous/anxious.           Objective     Physical Exam  Vitals reviewed.   Constitutional:       General: He is not in acute distress.     Appearance: Normal appearance. He is not ill-appearing.   Cardiovascular:      Rate and Rhythm: Normal rate and regular rhythm.      Heart sounds: Normal heart sounds.   Pulmonary:      Effort: Pulmonary effort is normal. No respiratory distress.      Breath sounds: Normal breath sounds.   Abdominal:      General: Bowel sounds are normal.      Palpations: Abdomen is soft.      Tenderness: There is no abdominal tenderness.   Musculoskeletal:      Cervical back: Neck supple.   Neurological:      General: No focal deficit present.      Mental Status: He is alert and oriented to person, place, and time.   Psychiatric:         Mood and Affect: Mood normal.         Behavior: Behavior normal.         Thought Content: Thought content normal.             Assessment and Plan     1. Stage 4 chronic kidney disease    2. Single kidney    3. Chronic gout of multiple sites, unspecified cause    4. 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    We will refill gout medication come October when it is due  Patient states he has already had his Medicare wellness visit through a nurse practitioner  Continue to follow with specialists  Risks, benefits, and side effects were discussed with the patient. All questions were answered to the fullest satisfaction of the patient, and pt verbalized understanding and agreement to treatment plan. Pt was to call with any new or worsening symptoms, or present to the ER.  Return to clinic 1 year sooner if needed       Hali Burrell MD  Family Medicine Physician   [facility]     This note was created using [employer] voice recognition software that occasionally may misinterpret phrases or words.

## 2025-05-20 ENCOUNTER — OFFICE VISIT (OUTPATIENT)
Dept: PODIATRY | Facility: CLINIC | Age: 85
End: 2025-05-20
Payer: MEDICARE

## 2025-05-20 VITALS — HEIGHT: 69 IN | WEIGHT: 168 LBS | BODY MASS INDEX: 24.88 KG/M2

## 2025-05-20 DIAGNOSIS — I73.9 PERIPHERAL VASCULAR DISEASE: Primary | ICD-10-CM

## 2025-05-20 DIAGNOSIS — L60.9 DISEASE OF NAIL: ICD-10-CM

## 2025-05-20 DIAGNOSIS — L84 CORN OR CALLUS: ICD-10-CM

## 2025-05-20 PROCEDURE — 11721 DEBRIDE NAIL 6 OR MORE: CPT | Mod: XS,S$GLB,, | Performed by: PODIATRIST

## 2025-05-20 PROCEDURE — 11055 PARING/CUTG B9 HYPRKER LES 1: CPT | Mod: S$GLB,,, | Performed by: PODIATRIST

## 2025-05-20 PROCEDURE — 99499 UNLISTED E&M SERVICE: CPT | Mod: S$GLB,,, | Performed by: PODIATRIST

## 2025-06-05 ENCOUNTER — LAB VISIT (OUTPATIENT)
Dept: LAB | Facility: HOSPITAL | Age: 85
End: 2025-06-05
Attending: INTERNAL MEDICINE
Payer: MEDICARE

## 2025-06-05 ENCOUNTER — OFFICE VISIT (OUTPATIENT)
Dept: CARDIOLOGY | Facility: CLINIC | Age: 85
End: 2025-06-05
Payer: MEDICARE

## 2025-06-05 VITALS
BODY MASS INDEX: 24.17 KG/M2 | HEIGHT: 69 IN | WEIGHT: 163.19 LBS | OXYGEN SATURATION: 92 % | HEART RATE: 53 BPM | SYSTOLIC BLOOD PRESSURE: 147 MMHG | DIASTOLIC BLOOD PRESSURE: 82 MMHG

## 2025-06-05 DIAGNOSIS — Z91.89 AT RISK FOR CARDIOVASCULAR EVENT: Primary | ICD-10-CM

## 2025-06-05 DIAGNOSIS — G47.19 EXCESSIVE DAYTIME SLEEPINESS: ICD-10-CM

## 2025-06-05 DIAGNOSIS — Z91.89 SEDENTARY LIFESTYLE: ICD-10-CM

## 2025-06-05 DIAGNOSIS — R00.1 BRADYCARDIA, DRUG INDUCED: ICD-10-CM

## 2025-06-05 DIAGNOSIS — H35.30 AMD (AGE RELATED MACULAR DEGENERATION): ICD-10-CM

## 2025-06-05 DIAGNOSIS — I95.1 ORTHOSTASIS: ICD-10-CM

## 2025-06-05 DIAGNOSIS — I10 PRIMARY HYPERTENSION: ICD-10-CM

## 2025-06-05 DIAGNOSIS — I48.0 PAF (PAROXYSMAL ATRIAL FIBRILLATION): ICD-10-CM

## 2025-06-05 DIAGNOSIS — N18.4 STAGE 4 CHRONIC KIDNEY DISEASE: ICD-10-CM

## 2025-06-05 DIAGNOSIS — R33.9 URINARY RETENTION: ICD-10-CM

## 2025-06-05 DIAGNOSIS — N18.30 CHRONIC KIDNEY DISEASE, STAGE III (MODERATE): Primary | ICD-10-CM

## 2025-06-05 DIAGNOSIS — T50.905A BRADYCARDIA, DRUG INDUCED: ICD-10-CM

## 2025-06-05 DIAGNOSIS — I70.0 ABDOMINAL AORTIC ATHEROSCLEROSIS: ICD-10-CM

## 2025-06-05 DIAGNOSIS — M10.9 GOUT, UNSPECIFIED CAUSE, UNSPECIFIED CHRONICITY, UNSPECIFIED SITE: ICD-10-CM

## 2025-06-05 PROBLEM — Z82.0 FAMILY HISTORY OF SLEEP APNEA: Status: RESOLVED | Noted: 2023-12-14 | Resolved: 2025-06-05

## 2025-06-05 LAB
ALBUMIN SERPL BCP-MCNC: 3.7 G/DL (ref 3.5–5.2)
ANION GAP (OHS): 9 MMOL/L (ref 8–16)
BILIRUB UR QL STRIP.AUTO: NEGATIVE
BUN SERPL-MCNC: 45 MG/DL (ref 8–23)
CALCIUM SERPL-MCNC: 9.4 MG/DL (ref 8.7–10.5)
CHLORIDE SERPL-SCNC: 109 MMOL/L (ref 95–110)
CLARITY UR: CLEAR
CO2 SERPL-SCNC: 24 MMOL/L (ref 23–29)
COLOR UR AUTO: YELLOW
CREAT SERPL-MCNC: 2.6 MG/DL (ref 0.5–1.4)
GFR SERPLBLD CREATININE-BSD FMLA CKD-EPI: 24 ML/MIN/1.73/M2
GLUCOSE SERPL-MCNC: 93 MG/DL (ref 70–110)
GLUCOSE UR QL STRIP: NEGATIVE
HGB UR QL STRIP: NEGATIVE
HOLD SPECIMEN: NORMAL
HOLD SPECIMEN: NORMAL
KETONES UR QL STRIP: NEGATIVE
LEUKOCYTE ESTERASE UR QL STRIP: NEGATIVE
NITRITE UR QL STRIP: NEGATIVE
OHS QRS DURATION: 78 MS
OHS QTC CALCULATION: 402 MS
PH UR STRIP: 7 [PH]
PHOSPHATE SERPL-MCNC: 3 MG/DL (ref 2.7–4.5)
POTASSIUM SERPL-SCNC: 4.4 MMOL/L (ref 3.5–5.1)
PROT UR QL STRIP: NEGATIVE
SODIUM SERPL-SCNC: 142 MMOL/L (ref 136–145)
SP GR UR STRIP: 1.01
URATE SERPL-MCNC: 6.4 MG/DL (ref 3.4–7)
UROBILINOGEN UR STRIP-ACNC: NEGATIVE EU/DL

## 2025-06-05 PROCEDURE — 82570 ASSAY OF URINE CREATININE: CPT

## 2025-06-05 PROCEDURE — 84100 ASSAY OF PHOSPHORUS: CPT

## 2025-06-05 PROCEDURE — 36415 COLL VENOUS BLD VENIPUNCTURE: CPT

## 2025-06-05 PROCEDURE — 84550 ASSAY OF BLOOD/URIC ACID: CPT

## 2025-06-05 PROCEDURE — 99999 PR PBB SHADOW E&M-EST. PATIENT-LVL III: CPT | Mod: PBBFAC,,, | Performed by: INTERNAL MEDICINE

## 2025-06-05 PROCEDURE — 81003 URINALYSIS AUTO W/O SCOPE: CPT

## 2025-06-05 RX ORDER — METOPROLOL SUCCINATE 25 MG/1
12.5 TABLET, EXTENDED RELEASE ORAL DAILY
Qty: 45 TABLET | Refills: 3 | Status: SHIPPED | OUTPATIENT
Start: 2025-06-05 | End: 2026-06-05

## 2025-06-06 LAB
ALBUMIN/CREAT UR: 45.2 UG/MG
CREAT UR-MCNC: 84 MG/DL (ref 23–375)
MICROALBUMIN UR-MCNC: 38 UG/ML (ref ?–5000)

## 2025-06-17 NOTE — PROGRESS NOTES
Subjective:     Patient ID: Josh Pinto is a 85 y.o. male    Chief Complaint: Follow-up (Nail care and corns bilateral )       Josh is a 85 y.o. male who presents to the clinic for evaluation and treatment of high risk feet. Josh has no past medical history on file. The patient's chief complaint is long, thick toenails. This patient has documented high risk feet requiring routine maintenance secondary to peripheral vascular disease.    PCP: Hali Burrell MD    Date Last Seen by PCP: 03/10/2025    Current shoe gear:  Affected Foot: Slip-on shoes     Unaffected Foot: Slip-on shoes    Last encounter in this department: 2/17/2025    Hemoglobin A1C   Date Value Ref Range Status   02/09/2015 6.2 4.5 - 6.2 % Final       Review of Systems   Constitutional: Negative.  Negative for chills and fever.   Respiratory:  Negative for cough and shortness of breath.    Cardiovascular:  Positive for leg swelling. Negative for chest pain.   Gastrointestinal:  Negative for diarrhea, nausea and vomiting.   Neurological:  Positive for tingling.        Objective:   Physical Exam  Vitals reviewed.   Constitutional:       General: He is not in acute distress.     Appearance: Normal appearance. He is not ill-appearing.   HENT:      Head: Normocephalic.      Nose: Nose normal.   Cardiovascular:      Pulses:           Dorsalis pedis pulses are 1+ on the right side and 1+ on the left side.        Posterior tibial pulses are 1+ on the right side and 1+ on the left side.   Pulmonary:      Effort: Pulmonary effort is normal. No respiratory distress.   Feet:      Right foot:      Skin integrity: Callus (plantar right 5th MPJ) present.   Skin:     Capillary Refill: Capillary refill takes 2 to 3 seconds.   Neurological:      Mental Status: He is alert and oriented to person, place, and time.   Psychiatric:         Mood and Affect: Mood normal.         Behavior: Behavior normal.         Thought Content: Thought content normal.        Foot  Exam    General  General Appearance: appears stated age and healthy   Orientation: alert and oriented to person, place, and time   Affect: appropriate   Assistance: wheelchair use       Right Foot/Ankle     Inspection and Palpation  Tenderness: lesser metatarsophalangeal joints   Swelling: dorsum   Skin Exam: callus (plantar right 5th MPJ), skin changes and abnormal color;     Neurovascular  Dorsalis pedis: 1+  Posterior tibial: 1+    Muscle Strength  Ankle dorsiflexion: 4  Ankle plantar flexion: 4  Ankle inversion: 4  Ankle eversion: 4      Left Foot/Ankle      Inspection and Palpation  Swelling: dorsum   Skin Exam: skin changes and abnormal color;     Neurovascular  Dorsalis pedis: 1+  Posterior tibial: 1+    Muscle Strength  Ankle dorsiflexion: 4  Ankle plantar flexion: 4  Ankle inversion: 4  Ankle eversion: 4           Dermatologic: Nails 1 through 5 bilateral thickened elongated discolored with subungual debris, soft tissue atrophic skin changes noted bilateral foot including hyperpigmentation   Vascular: +1 nonpitting edema noted bilateral ankle, pedal hair growth is absent bilateral lower extremity   Neurologic: Positive paresthesias reported  Assessment:         1. Peripheral vascular disease    2. Disease of nail    3. Corn or callus       Plan:     Josh Pinto was seen today for   Chief Complaint   Patient presents with    Follow-up     Nail care and corns bilateral        Assessment & Plan           Routine Foot Care    Date/Time: 5/20/2025 3:15 PM    Performed by: Pau Burrell DPM  Authorized by: Pau Burrell DPM    Consent Done?:  Yes (Verbal)  Hyperkeratotic Skin Lesions?: Yes    Number of trimmed lesions:  1  Location(s):  Right 5th Metatarsal Head    Nail Care Type:  Debride  Location(s): All  (Left 1st Toe, Left 3rd Toe, Left 2nd Toe, Left 4th Toe, Left 5th Toe, Right 1st Toe, Right 2nd Toe, Right 3rd Toe, Right 4th Toe and Right 5th Toe)  Patient tolerance:  Patient tolerated the  procedure well with no immediate complications       1. Patient was examined and evaluated.    2. Discussed with patient etiology of peripheral vascular disease.  Patient was advised elevate lower extremity rest consider daily use of compression stockings.  Patient will monitor dietary salt intake and water consumption.    3. Discussed with patient etiology of callus formation.  Patient was warned of potential recurrence over time.  Patient was dispensed offloading pads for reduction of direct contact to the lesion.  Patient was advised to perform safe debridement at home with a emery board, nail file or pumice stone.  Patient was advised to apply ointments / moisturizer to the area for softening purposes.  4. Discussed with patient the etiology of nail fungus and as possible secondary issue to prior nail trauma.  Discussed with patient OTC topical conservative treatments such as Vicks vapor rub, tea tree oil as well as coconut oil.  Discussed with patient risks and benefits oral Lamisil therapy.   5. Patient will follow-up in 2-3 months or p.r.n. for complaints      Don Burrell DPM  [address] [place] [address]  [phone]      This note was created using [employer] direct voice recognition software. Note may have occasional typographical errors that may not have been identified and edited despite initial review prior to signing.

## (undated) DEVICE — TROCAR ENDOPATH XCEL 5MM 7.5CM

## (undated) DEVICE — SCISSOR 5MMX35CM DIRECT DRIVE

## (undated) DEVICE — NDL HYPO REG 25G X 1 1/2

## (undated) DEVICE — DRAPE STERI INSTRUMENT 1018

## (undated) DEVICE — TUBING HF INSUFFLATION W/ FLTR

## (undated) DEVICE — KIT ANTIFOG W/SPONG & FLUID

## (undated) DEVICE — ELECTRODE REM PLYHSV RETURN 9

## (undated) DEVICE — TOWEL OR DISP STRL BLUE 4/PK

## (undated) DEVICE — TROCAR ENDOPATH XCEL 5X75MM

## (undated) DEVICE — APPLIER CLIP ENDO LIGAMAX 5MM

## (undated) DEVICE — SUT MCRYL PLUS 4-0 PS2 27IN

## (undated) DEVICE — SOL NS 1000CC

## (undated) DEVICE — SUT 0 VICRYL / UR6 (J603)

## (undated) DEVICE — APPLICATOR SURGICAL ARISTA XL

## (undated) DEVICE — BAG TISS RETRV MONARCH 10MM

## (undated) DEVICE — BLADE SURG CARBON STEEL SZ11

## (undated) DEVICE — DRAPE CORETEMP FLD WRM 56X62IN

## (undated) DEVICE — TRAY MINOR GEN SURG OMC

## (undated) DEVICE — POWDER ARISTA AH 3G

## (undated) DEVICE — NDL HYPO STD REG BVL 18GX1.5IN

## (undated) DEVICE — DRAPE ABDOMINAL TIBURON 14X11

## (undated) DEVICE — NDL INSUF ULTRA VERESS 120MM

## (undated) DEVICE — ADHESIVE DERMABOND ADVANCED

## (undated) DEVICE — IRRIGATOR ENDOSCOPY DISP.

## (undated) DEVICE — TROCAR KII BLLN 12MM 10CM